# Patient Record
Sex: MALE | Race: BLACK OR AFRICAN AMERICAN | Employment: UNEMPLOYED | URBAN - METROPOLITAN AREA
[De-identification: names, ages, dates, MRNs, and addresses within clinical notes are randomized per-mention and may not be internally consistent; named-entity substitution may affect disease eponyms.]

---

## 2017-01-20 ENCOUNTER — HOSPITAL ENCOUNTER (EMERGENCY)
Age: 57
Discharge: HOME | End: 2017-01-21
Attending: EMERGENCY MEDICINE | Admitting: EMERGENCY MEDICINE
Payer: MEDICAID

## 2017-01-20 ENCOUNTER — APPOINTMENT (OUTPATIENT)
Dept: GENERAL RADIOLOGY | Age: 57
End: 2017-01-20
Attending: EMERGENCY MEDICINE
Payer: MEDICAID

## 2017-01-20 DIAGNOSIS — G89.4 CHRONIC PAIN SYNDROME: ICD-10-CM

## 2017-01-20 DIAGNOSIS — D57.00 SICKLE-CELL ANEMIA WITH CRISIS (HCC): Primary | ICD-10-CM

## 2017-01-20 LAB
ALBUMIN SERPL BCP-MCNC: 3.1 G/DL (ref 3.4–5)
ALBUMIN/GLOB SERPL: 0.8 {RATIO} (ref 0.8–1.7)
ALP SERPL-CCNC: 87 U/L (ref 45–117)
ALT SERPL-CCNC: 24 U/L (ref 16–61)
AMPHET UR QL SCN: NEGATIVE
ANION GAP BLD CALC-SCNC: 11 MMOL/L (ref 3–18)
APPEARANCE UR: CLEAR
AST SERPL W P-5'-P-CCNC: 32 U/L (ref 15–37)
BACTERIA URNS QL MICRO: NEGATIVE /HPF
BARBITURATES UR QL SCN: NEGATIVE
BASOPHILS # BLD AUTO: 0 K/UL (ref 0–0.1)
BASOPHILS # BLD: 0 % (ref 0–3)
BENZODIAZ UR QL: NEGATIVE
BILIRUB SERPL-MCNC: 1 MG/DL (ref 0.2–1)
BILIRUB UR QL: NEGATIVE
BUN SERPL-MCNC: 39 MG/DL (ref 7–18)
BUN/CREAT SERPL: 15 (ref 12–20)
CALCIUM SERPL-MCNC: 8 MG/DL (ref 8.5–10.1)
CANNABINOIDS UR QL SCN: POSITIVE
CHLORIDE SERPL-SCNC: 108 MMOL/L (ref 100–108)
CK SERPL-CCNC: 75 U/L (ref 39–308)
CO2 SERPL-SCNC: 18 MMOL/L (ref 21–32)
COCAINE UR QL SCN: NEGATIVE
COLOR UR: YELLOW
CREAT SERPL-MCNC: 2.53 MG/DL (ref 0.6–1.3)
DIFFERENTIAL METHOD BLD: ABNORMAL
EOSINOPHIL # BLD: 0 K/UL (ref 0–0.4)
EOSINOPHIL NFR BLD: 0 % (ref 0–5)
EPITH CASTS URNS QL MICRO: NORMAL /LPF (ref 0–5)
ERYTHROCYTE [DISTWIDTH] IN BLOOD BY AUTOMATED COUNT: 17.2 % (ref 11.6–14.5)
GLOBULIN SER CALC-MCNC: 4.1 G/DL (ref 2–4)
GLUCOSE SERPL-MCNC: 82 MG/DL (ref 74–99)
GLUCOSE UR STRIP.AUTO-MCNC: NEGATIVE MG/DL
HCT VFR BLD AUTO: 18.1 % (ref 36–48)
HDSCOM,HDSCOM: ABNORMAL
HGB BLD-MCNC: 6.3 G/DL (ref 13–16)
HGB UR QL STRIP: ABNORMAL
KETONES UR QL STRIP.AUTO: NEGATIVE MG/DL
LEUKOCYTE ESTERASE UR QL STRIP.AUTO: NEGATIVE
LYMPHOCYTES # BLD AUTO: 29 % (ref 20–51)
LYMPHOCYTES # BLD: 3.2 K/UL (ref 0.8–3.5)
MAGNESIUM SERPL-MCNC: 2.2 MG/DL (ref 1.8–2.4)
MCH RBC QN AUTO: 28.6 PG (ref 24–34)
MCHC RBC AUTO-ENTMCNC: 34.8 G/DL (ref 31–37)
MCV RBC AUTO: 82.3 FL (ref 74–97)
METHADONE UR QL: NEGATIVE
MONOCYTES # BLD: 0.3 K/UL (ref 0–1)
MONOCYTES NFR BLD AUTO: 3 % (ref 2–9)
NEUTS SEG # BLD: 7.5 K/UL (ref 1.8–8)
NEUTS SEG NFR BLD AUTO: 68 % (ref 42–75)
NITRITE UR QL STRIP.AUTO: NEGATIVE
OPIATES UR QL: NEGATIVE
PCP UR QL: NEGATIVE
PH UR STRIP: 6 [PH] (ref 5–8)
PLATELET # BLD AUTO: 250 K/UL (ref 135–420)
PMV BLD AUTO: 9.7 FL (ref 9.2–11.8)
POTASSIUM SERPL-SCNC: 5.4 MMOL/L (ref 3.5–5.5)
PROT SERPL-MCNC: 7.2 G/DL (ref 6.4–8.2)
PROT UR STRIP-MCNC: 300 MG/DL
RBC # BLD AUTO: 2.2 M/UL (ref 4.7–5.5)
RBC #/AREA URNS HPF: NORMAL /HPF (ref 0–5)
RBC MORPH BLD: ABNORMAL
RETICS/RBC NFR AUTO: 3.2 % (ref 0.5–2.3)
SODIUM SERPL-SCNC: 137 MMOL/L (ref 136–145)
SP GR UR REFRACTOMETRY: 1.01 (ref 1–1.03)
UROBILINOGEN UR QL STRIP.AUTO: 1 EU/DL (ref 0.2–1)
WBC # BLD AUTO: 11 K/UL (ref 4.6–13.2)
WBC URNS QL MICRO: NORMAL /HPF (ref 0–5)

## 2017-01-20 PROCEDURE — 82550 ASSAY OF CK (CPK): CPT | Performed by: EMERGENCY MEDICINE

## 2017-01-20 PROCEDURE — 85025 COMPLETE CBC W/AUTO DIFF WBC: CPT | Performed by: EMERGENCY MEDICINE

## 2017-01-20 PROCEDURE — 96361 HYDRATE IV INFUSION ADD-ON: CPT

## 2017-01-20 PROCEDURE — 80053 COMPREHEN METABOLIC PANEL: CPT | Performed by: EMERGENCY MEDICINE

## 2017-01-20 PROCEDURE — 73521 X-RAY EXAM HIPS BI 2 VIEWS: CPT

## 2017-01-20 PROCEDURE — 81001 URINALYSIS AUTO W/SCOPE: CPT | Performed by: EMERGENCY MEDICINE

## 2017-01-20 PROCEDURE — 74011250636 HC RX REV CODE- 250/636: Performed by: EMERGENCY MEDICINE

## 2017-01-20 PROCEDURE — 96375 TX/PRO/DX INJ NEW DRUG ADDON: CPT

## 2017-01-20 PROCEDURE — 96374 THER/PROPH/DIAG INJ IV PUSH: CPT

## 2017-01-20 PROCEDURE — 99283 EMERGENCY DEPT VISIT LOW MDM: CPT

## 2017-01-20 PROCEDURE — 85045 AUTOMATED RETICULOCYTE COUNT: CPT | Performed by: EMERGENCY MEDICINE

## 2017-01-20 PROCEDURE — 83735 ASSAY OF MAGNESIUM: CPT | Performed by: EMERGENCY MEDICINE

## 2017-01-20 PROCEDURE — 80307 DRUG TEST PRSMV CHEM ANLYZR: CPT | Performed by: EMERGENCY MEDICINE

## 2017-01-20 RX ORDER — HYDROMORPHONE HYDROCHLORIDE 1 MG/ML
1 INJECTION, SOLUTION INTRAMUSCULAR; INTRAVENOUS; SUBCUTANEOUS ONCE
Status: COMPLETED | OUTPATIENT
Start: 2017-01-20 | End: 2017-01-20

## 2017-01-20 RX ORDER — KETOROLAC TROMETHAMINE 30 MG/ML
30 INJECTION, SOLUTION INTRAMUSCULAR; INTRAVENOUS
Status: COMPLETED | OUTPATIENT
Start: 2017-01-20 | End: 2017-01-20

## 2017-01-20 RX ORDER — SODIUM CHLORIDE 0.9 % (FLUSH) 0.9 %
5-10 SYRINGE (ML) INJECTION AS NEEDED
Status: DISCONTINUED | OUTPATIENT
Start: 2017-01-20 | End: 2017-01-21 | Stop reason: HOSPADM

## 2017-01-20 RX ORDER — SODIUM CHLORIDE 0.9 % (FLUSH) 0.9 %
5-10 SYRINGE (ML) INJECTION EVERY 8 HOURS
Status: DISCONTINUED | OUTPATIENT
Start: 2017-01-20 | End: 2017-01-21 | Stop reason: HOSPADM

## 2017-01-20 RX ADMIN — KETOROLAC TROMETHAMINE 30 MG: 30 INJECTION, SOLUTION INTRAMUSCULAR at 23:03

## 2017-01-20 RX ADMIN — HYDROMORPHONE HYDROCHLORIDE 1 MG: 1 INJECTION, SOLUTION INTRAMUSCULAR; INTRAVENOUS; SUBCUTANEOUS at 23:04

## 2017-01-20 RX ADMIN — SODIUM CHLORIDE 1000 ML: 900 INJECTION, SOLUTION INTRAVENOUS at 23:03

## 2017-01-21 VITALS
HEIGHT: 64 IN | OXYGEN SATURATION: 99 % | BODY MASS INDEX: 23.03 KG/M2 | TEMPERATURE: 98.4 F | DIASTOLIC BLOOD PRESSURE: 86 MMHG | HEART RATE: 87 BPM | RESPIRATION RATE: 16 BRPM | WEIGHT: 134.92 LBS | SYSTOLIC BLOOD PRESSURE: 165 MMHG

## 2017-01-21 RX ORDER — OXYCODONE AND ACETAMINOPHEN 10; 325 MG/1; MG/1
1 TABLET ORAL
Qty: 15 TAB | Refills: 0 | Status: SHIPPED | OUTPATIENT
Start: 2017-01-21 | End: 2017-03-29

## 2017-01-21 RX ORDER — HYDROXYUREA 500 MG/1
500 CAPSULE ORAL DAILY
Qty: 30 CAP | Refills: 0 | Status: SHIPPED | OUTPATIENT
Start: 2017-01-21 | End: 2017-02-20

## 2017-01-21 RX ORDER — CARVEDILOL 6.25 MG/1
6.25 TABLET ORAL 2 TIMES DAILY WITH MEALS
Qty: 60 TAB | Refills: 0 | Status: SHIPPED | OUTPATIENT
Start: 2017-01-21 | End: 2017-12-01

## 2017-01-21 RX ORDER — UREA 10 %
100 LOTION (ML) TOPICAL DAILY
Qty: 30 TAB | Refills: 0 | Status: SHIPPED | OUTPATIENT
Start: 2017-01-21 | End: 2017-12-01

## 2017-01-21 RX ORDER — SERTRALINE HYDROCHLORIDE 100 MG/1
100 TABLET, FILM COATED ORAL DAILY
Qty: 30 TAB | Refills: 0 | Status: ON HOLD | OUTPATIENT
Start: 2017-01-21 | End: 2017-11-28

## 2017-01-21 RX ORDER — PYRIDOXINE HCL (VITAMIN B6) 100 MG
100 TABLET ORAL DAILY
Qty: 30 TAB | Refills: 0 | Status: SHIPPED | OUTPATIENT
Start: 2017-01-21 | End: 2017-01-21

## 2017-01-21 RX ORDER — AMLODIPINE BESYLATE 5 MG/1
10 TABLET ORAL DAILY
Qty: 60 TAB | Refills: 0 | Status: SHIPPED | OUTPATIENT
Start: 2017-01-21 | End: 2017-12-01

## 2017-01-21 RX ORDER — DOXEPIN HYDROCHLORIDE 50 MG/1
50 CAPSULE ORAL
Qty: 30 CAP | Refills: 0 | Status: SHIPPED | OUTPATIENT
Start: 2017-01-21 | End: 2017-01-21

## 2017-01-21 RX ORDER — DOXEPIN HYDROCHLORIDE 50 MG/1
50 CAPSULE ORAL
Qty: 30 CAP | Refills: 0 | Status: SHIPPED | OUTPATIENT
Start: 2017-01-21 | End: 2017-12-01

## 2017-01-21 RX ORDER — SERTRALINE HYDROCHLORIDE 100 MG/1
100 TABLET, FILM COATED ORAL DAILY
Qty: 30 TAB | Refills: 0 | Status: SHIPPED | OUTPATIENT
Start: 2017-01-21 | End: 2017-01-21

## 2017-01-21 RX ORDER — CARVEDILOL 6.25 MG/1
6.25 TABLET ORAL 2 TIMES DAILY WITH MEALS
Qty: 60 TAB | Refills: 0 | Status: SHIPPED | OUTPATIENT
Start: 2017-01-21 | End: 2017-01-21

## 2017-01-21 RX ORDER — PYRIDOXINE HCL (VITAMIN B6) 100 MG
100 TABLET ORAL DAILY
Qty: 30 TAB | Refills: 0 | Status: SHIPPED | OUTPATIENT
Start: 2017-01-21 | End: 2017-12-01

## 2017-01-21 RX ORDER — FOLIC ACID 1 MG/1
1 TABLET ORAL DAILY
Qty: 30 TAB | Refills: 2 | Status: SHIPPED | OUTPATIENT
Start: 2017-01-21 | End: 2017-01-21

## 2017-01-21 RX ORDER — UREA 10 %
100 LOTION (ML) TOPICAL DAILY
Qty: 30 TAB | Refills: 0 | Status: SHIPPED | OUTPATIENT
Start: 2017-01-21 | End: 2017-01-21

## 2017-01-21 RX ORDER — AMLODIPINE BESYLATE 5 MG/1
10 TABLET ORAL DAILY
Qty: 60 TAB | Refills: 0 | Status: SHIPPED | OUTPATIENT
Start: 2017-01-21 | End: 2017-01-21

## 2017-01-21 RX ORDER — FOLIC ACID 1 MG/1
1 TABLET ORAL DAILY
Qty: 30 TAB | Refills: 2 | Status: SHIPPED | OUTPATIENT
Start: 2017-01-21 | End: 2017-12-01

## 2017-01-21 RX ORDER — HYDROXYUREA 500 MG/1
500 CAPSULE ORAL DAILY
Qty: 30 CAP | Refills: 0 | Status: SHIPPED | OUTPATIENT
Start: 2017-01-21 | End: 2017-01-21

## 2017-01-21 NOTE — ED NOTES
Pt's daughter called and states that she is waiting for her  to come back with her car but that she does not know where  is and that he is not answering his phone. States that when her  comes back, she will come pick pt up.

## 2017-01-21 NOTE — ED NOTES
Attempted to call pt's daughter Luh Trinidad at number provided by pt at [phone]. Left message on voicemail for pt to return call.
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
## 2017-01-21 NOTE — ED NOTES
Pt attempting to get in touch with his ride. RN confronted pt about ride being in waiting room. Pt states, \"well my daughter has kids and left and was gonna come back when I'm done\".

## 2017-01-21 NOTE — ED NOTES
Pt states his ride is in the waiting room, stated same thing to physician. RN made it very clear that since pt is receiving narcotics he has to have someone pick him up and it cannot be a cab. Pt verbalized understanding. RN called to waiting room for family. Registration states his family stepped out.

## 2017-01-21 NOTE — ED TRIAGE NOTES
Right side arm, shoulder, back and leg pain r/t sickle cell. States is out of 10mg/325mg percocet b/c needs to find new pain management dr. He was d/c from his last practice r/t [facility] in [facility]. Sepsis Screening completed    (  )Patient meets SIRS criteria. ( x )Patient does not meet SIRS criteria.       SIRS Criteria is achieved when two or more of the following are present   Temperature < 96.8°F (36°C) or > 100.9°F (38.3°C)   Heart Rate > 90 beats per minute   Respiratory Rate > 20 beats per minute   WBC count > 12,000 or <4,000 or > 10% bands

## 2017-01-21 NOTE — DISCHARGE INSTRUCTIONS
Chronic Pain: Care Instructions  Your Care Instructions  Chronic pain is pain that lasts a long time (months or even years) and may or may not have a clear cause. It is different from acute pain, which usually does have a clear cause--like an injury or illness--and gets better over time. Chronic pain:  · Lasts over time but may vary from day to day. · Does not go away despite efforts to end it. · May disrupt your sleep and lead to fatigue. · May cause depression or anxiety. · May make your muscles tense, causing more pain. · Can disrupt your work, hobbies, home life, and relationships with friends and family. Chronic pain is a very real condition. It is not just in your head. Treatment can help and usually includes several methods used together, such as medicines, physical therapy, exercise, and other treatments. Learning how to relax and changing negative thought patterns can also help you cope. Chronic pain is complex. Taking an active role in your treatment will help you better manage your pain. Tell your doctor if you have trouble dealing with your pain. You may have to try several things before you find what works best for you. Follow-up care is a key part of your treatment and safety. Be sure to make and go to all appointments, and call your doctor if you are having problems. Its also a good idea to know your test results and keep a list of the medicines you take. How can you care for yourself at home? · Pace yourself. Break up large jobs into smaller tasks. Save harder tasks for days when you have less pain, or go back and forth between hard tasks and easier ones. Take rest breaks. · Relax, and reduce stress. Relaxation techniques such as deep breathing or meditation can help. · Keep moving. Gentle, daily exercise can help reduce pain over the long run. Try low- or no-impact exercises such as walking, swimming, and stationary biking. Do stretches to stay flexible.   · Try heat, cold packs, and massage. · Get enough sleep. Chronic pain can make you tired and drain your energy. Talk with your doctor if you have trouble sleeping because of pain. · Think positive. Your thoughts can affect your pain level. Do things that you enjoy to distract yourself when you have pain instead of focusing on the pain. See a movie, read a book, listen to music, or spend time with a friend. · If you think you are depressed, talk to your doctor about treatment. · Keep a daily pain diary. Record how your moods, thoughts, sleep patterns, activities, and medicine affect your pain. You may find that your pain is worse during or after certain activities or when you are feeling a certain emotion. Having a record of your pain can help you and your doctor find the best ways to treat your pain. · Take pain medicines exactly as directed. ¨ If the doctor gave you a prescription medicine for pain, take it as prescribed. ¨ If you are not taking a prescription pain medicine, ask your doctor if you can take an over-the-counter medicine. Reducing constipation caused by pain medicine  · Include fruits, vegetables, beans, and whole grains in your diet each day. These foods are high in fiber. · Drink plenty of fluids, enough so that your urine is light yellow or clear like water. If you have kidney, heart, or liver disease and have to limit fluids, talk with your doctor before you increase the amount of fluids you drink. · If your doctor recommends it, get more exercise. Walking is a good choice. Bit by bit, increase the amount you walk every day. Try for at least 30 minutes on most days of the week. · Schedule time each day for a bowel movement. A daily routine may help. Take your time and do not strain when having a bowel movement. When should you call for help? Call your doctor now or seek immediate medical care if:  · Your pain gets worse or is out of control.   · You feel down or blue, or you do not enjoy things like you once did. You may be depressed, which is common in people with chronic pain. Depression can be treated. · You have vomiting or cramps for more than 2 hours. Watch closely for changes in your health, and be sure to contact your doctor if:  · You cannot sleep because of pain. · You are very worried or anxious about your pain. · You have trouble taking your pain medicine. · You have any concerns about your pain medicine. · You have trouble with bowel movements, such as:  ¨ No bowel movement in 3 days. ¨ Blood in the anal area, in your stool, or on the toilet paper. ¨ Diarrhea for more than 24 hours. Where can you learn more? Go to http://harman-rosalio.info/. Enter N004 in the search box to learn more about \"Chronic Pain: Care Instructions. \"  Current as of: February 19, 2016  Content Version: 11.1  © 0504-7125 [employer]. Care instructions adapted under license by [employer] (which disclaims liability or warranty for this information). If you have questions about a medical condition or this instruction, always ask your healthcare professional. [place] [address] any warranty or liability for your use of this information. Sickle Cell Crisis: Care Instructions  Your Care Instructions    Sickle cell crisis is a painful episode that may begin suddenly in a person with sickle cell disease. Sickle cell disease turns normal, round red blood cells into cells that look like munira or crescent moons. The sickle-shaped cells can get stuck in blood vessels, blocking blood flow and causing severe pain. The pain can occur in the bones of the spine, the arms and legs, the chest, and the abdomen. An episode may be called a \"painful event\" or \"painful crisis. \" Some people who have sickle cell disease have many painful events, while others have few or none. Treatment depends on the level of pain and how long it lasts.  Sometimes taking nonprescription pain relievers can help. Or you may need stronger pain relief medicine that is prescribed or given by a doctor. You may need to be treated in the hospital.  It isn't always possible to know what sets off a painful event. But triggers include being dehydrated, cold temperatures, infection, stress, and not getting enough oxygen. Follow-up care is a key part of your treatment and safety. Be sure to make and go to all appointments, and call your doctor if you are having problems. It's also a good idea to know your test results and keep a list of the medicines you take. How can you care for yourself at home? · Create a pain management plan with your doctor. This plan should include the types of medicines you can take and other actions you can take at home to relieve pain. · Drink plenty of fluids, enough so that your urine is light yellow or clear like water. If you have kidney, heart, or liver disease and have to limit fluids, talk with your doctor before you increase the amount of fluids you drink. · Take your medicines exactly as prescribed. Call your doctor if you think you are having a problem with your medicine. · Take pain medicines exactly as directed. ¨ If the doctor gave you a prescription medicine for pain, take it as prescribed. ¨ If you are not taking a prescription pain medicine, ask your doctor if you can take an over-the-counter medicine. · Avoid alcohol. It can make you dehydrated. · Dress warmly in cold weather. The cold and windy weather can lead to severe pain. · Do not smoke. Smoking can reduce the amount of oxygen in your blood. · Get plenty of sleep. When should you call for help? Call 911 anytime you think you may need emergency care. For example, call if:  · You passed out (lost consciousness). Call your doctor now or seek immediate medical care if:  · You are in severe pain. · You are dizzy or lightheaded, or you feel like you may faint. · You have a fever.   · You are short of breath. · Your symptoms are getting worse. Watch closely for changes in your health, and be sure to contact your doctor if you are not getting better as expected. Where can you learn more? Go to http://harman-rosalio.info/. Enter F104 in the search box to learn more about \"Sickle Cell Crisis: Care Instructions. \"  Current as of: February 5, 2016  Content Version: 11.1  © 1729-8090 [employer]. Care instructions adapted under license by [employer] (which disclaims liability or warranty for this information). If you have questions about a medical condition or this instruction, always ask your healthcare professional. [address] any warranty or liability for your use of this information.

## 2017-03-16 ENCOUNTER — HOSPITAL ENCOUNTER (INPATIENT)
Age: 57
LOS: 12 days | Discharge: HOME HEALTH | DRG: 662 | End: 2017-03-29
Attending: FAMILY MEDICINE | Admitting: FAMILY MEDICINE
Payer: MEDICAID

## 2017-03-16 ENCOUNTER — APPOINTMENT (OUTPATIENT)
Dept: GENERAL RADIOLOGY | Age: 57
DRG: 662 | End: 2017-03-16
Attending: FAMILY MEDICINE
Payer: MEDICAID

## 2017-03-16 DIAGNOSIS — E87.5 ACUTE HYPERKALEMIA: ICD-10-CM

## 2017-03-16 DIAGNOSIS — N17.9 AKI (ACUTE KIDNEY INJURY) (HCC): ICD-10-CM

## 2017-03-16 DIAGNOSIS — D57.00 SICKLE CELL CRISIS (HCC): ICD-10-CM

## 2017-03-16 DIAGNOSIS — T40.601A NARCOTIC OVERDOSE, ACCIDENTAL OR UNINTENTIONAL, INITIAL ENCOUNTER (HCC): Primary | ICD-10-CM

## 2017-03-16 LAB
ANION GAP BLD CALC-SCNC: 7 MMOL/L (ref 3–18)
BASOPHILS # BLD AUTO: 0 K/UL (ref 0–0.1)
BASOPHILS # BLD: 0 % (ref 0–3)
BUN SERPL-MCNC: 30 MG/DL (ref 7–18)
BUN/CREAT SERPL: 11 (ref 12–20)
CALCIUM SERPL-MCNC: 8 MG/DL (ref 8.5–10.1)
CHLORIDE SERPL-SCNC: 103 MMOL/L (ref 100–108)
CO2 SERPL-SCNC: 23 MMOL/L (ref 21–32)
CREAT SERPL-MCNC: 2.8 MG/DL (ref 0.6–1.3)
DIFFERENTIAL METHOD BLD: ABNORMAL
EOSINOPHIL # BLD: 0.3 K/UL (ref 0–0.4)
EOSINOPHIL NFR BLD: 1 % (ref 0–5)
ERYTHROCYTE [DISTWIDTH] IN BLOOD BY AUTOMATED COUNT: 16.8 % (ref 11.6–14.5)
GLUCOSE SERPL-MCNC: 118 MG/DL (ref 74–99)
HCT VFR BLD AUTO: 17.7 % (ref 36–48)
HGB BLD-MCNC: 6.1 G/DL (ref 13–16)
LYMPHOCYTES # BLD AUTO: 11 % (ref 20–51)
LYMPHOCYTES # BLD: 3 K/UL (ref 0.8–3.5)
MCH RBC QN AUTO: 29.2 PG (ref 24–34)
MCHC RBC AUTO-ENTMCNC: 34.5 G/DL (ref 31–37)
MCV RBC AUTO: 84.7 FL (ref 74–97)
MONOCYTES # BLD: 2.5 K/UL (ref 0–1)
MONOCYTES NFR BLD AUTO: 9 % (ref 2–9)
NEUTS SEG # BLD: 21.9 K/UL (ref 1.8–8)
NEUTS SEG NFR BLD AUTO: 79 % (ref 42–75)
PLATELET # BLD AUTO: 221 K/UL (ref 135–420)
PMV BLD AUTO: 10.2 FL (ref 9.2–11.8)
POTASSIUM SERPL-SCNC: 5.8 MMOL/L (ref 3.5–5.5)
RBC # BLD AUTO: 2.09 M/UL (ref 4.7–5.5)
RBC MORPH BLD: ABNORMAL
SODIUM SERPL-SCNC: 133 MMOL/L (ref 136–145)
WBC # BLD AUTO: 27.7 K/UL (ref 4.6–13.2)

## 2017-03-16 PROCEDURE — 74011250637 HC RX REV CODE- 250/637: Performed by: FAMILY MEDICINE

## 2017-03-16 PROCEDURE — 96361 HYDRATE IV INFUSION ADD-ON: CPT

## 2017-03-16 PROCEDURE — 74011000250 HC RX REV CODE- 250: Performed by: FAMILY MEDICINE

## 2017-03-16 PROCEDURE — 77030013169 SET IV BLD ICUM -A

## 2017-03-16 PROCEDURE — 71010 XR CHEST PORT: CPT

## 2017-03-16 PROCEDURE — 85025 COMPLETE CBC W/AUTO DIFF WBC: CPT | Performed by: FAMILY MEDICINE

## 2017-03-16 PROCEDURE — 96375 TX/PRO/DX INJ NEW DRUG ADDON: CPT

## 2017-03-16 PROCEDURE — 96374 THER/PROPH/DIAG INJ IV PUSH: CPT

## 2017-03-16 PROCEDURE — 93005 ELECTROCARDIOGRAM TRACING: CPT

## 2017-03-16 PROCEDURE — 80048 BASIC METABOLIC PNL TOTAL CA: CPT | Performed by: FAMILY MEDICINE

## 2017-03-16 PROCEDURE — 74011250636 HC RX REV CODE- 250/636: Performed by: FAMILY MEDICINE

## 2017-03-16 PROCEDURE — 99285 EMERGENCY DEPT VISIT HI MDM: CPT

## 2017-03-16 RX ORDER — DEXTROSE 50 % IN WATER (D50W) INTRAVENOUS SYRINGE
25
Status: COMPLETED | OUTPATIENT
Start: 2017-03-16 | End: 2017-03-16

## 2017-03-16 RX ORDER — KETOROLAC TROMETHAMINE 30 MG/ML
15 INJECTION, SOLUTION INTRAMUSCULAR; INTRAVENOUS
Status: COMPLETED | OUTPATIENT
Start: 2017-03-16 | End: 2017-03-16

## 2017-03-16 RX ORDER — SODIUM POLYSTYRENE SULFONATE 15 G/60ML
15 SUSPENSION ORAL; RECTAL
Status: COMPLETED | OUTPATIENT
Start: 2017-03-16 | End: 2017-03-16

## 2017-03-16 RX ORDER — NALOXONE HYDROCHLORIDE 1 MG/ML
1 INJECTION INTRAMUSCULAR; INTRAVENOUS; SUBCUTANEOUS
Status: COMPLETED | OUTPATIENT
Start: 2017-03-16 | End: 2017-03-16

## 2017-03-16 RX ADMIN — SODIUM CHLORIDE 2000 ML: 900 INJECTION, SOLUTION INTRAVENOUS at 21:53

## 2017-03-16 RX ADMIN — NALOXONE HYDROCHLORIDE 1 MG: 1 INJECTION PARENTERAL at 21:19

## 2017-03-16 RX ADMIN — HUMAN INSULIN 10 UNITS: 100 INJECTION, SOLUTION SUBCUTANEOUS at 22:55

## 2017-03-16 RX ADMIN — SODIUM POLYSTYRENE SULFONATE 15 G: 15 SUSPENSION ORAL; RECTAL at 22:59

## 2017-03-16 RX ADMIN — KETOROLAC TROMETHAMINE 15 MG: 30 INJECTION, SOLUTION INTRAMUSCULAR at 21:54

## 2017-03-16 RX ADMIN — DEXTROSE MONOHYDRATE 25 G: 500 INJECTION PARENTERAL at 22:57

## 2017-03-16 RX ADMIN — SODIUM CHLORIDE 1000 ML: 900 INJECTION, SOLUTION INTRAVENOUS at 21:18

## 2017-03-16 NOTE — IP AVS SNAPSHOT
Current Discharge Medication List  
  
START taking these medications Dose & Instructions Dispensing Information Comments Morning Noon Evening Bedtime  
 amoxicillin-clavulanate 875-125 mg per tablet Commonly known as:  AUGMENTIN Your last dose was: Your next dose is:    
   
   
 Dose:  1 Tab Take 1 Tab by mouth two (2) times a day for 10 days. Quantity:  20 Tab Refills:  0  
     
   
   
   
  
 nicotine 21 mg/24 hr  
Commonly known as:  Fanyalexa Ocasio Your last dose was: Your next dose is:    
   
   
 Dose:  1 Patch 1 Patch by TransDERmal route daily for 30 days. Quantity:  30 Patch Refills:  0  
     
   
   
   
  
 oxyCODONE-acetaminophen 7.5-325 mg per tablet Commonly known as:  PERCOCET 7.5 Replaces:  oxyCODONE-acetaminophen  mg per tablet Your last dose was: Your next dose is:    
   
   
 Dose:  1 Tab Take 1 Tab by mouth every six (6) hours as needed. Max Daily Amount: 4 Tabs. Quantity:  10 Tab Refills:  0 CONTINUE these medications which have NOT CHANGED Dose & Instructions Dispensing Information Comments Morning Noon Evening Bedtime  
 amLODIPine 5 mg tablet Commonly known as:  Yon Kuo Your last dose was: Your next dose is:    
   
   
 Dose:  10 mg Take 2 Tabs by mouth daily. Quantity:  60 Tab Refills:  0  
     
   
   
   
  
 carvedilol 6.25 mg tablet Commonly known as:  Ezio Yang Your last dose was: Your next dose is:    
   
   
 Dose:  6.25 mg Take 1 Tab by mouth two (2) times daily (with meals). Quantity:  60 Tab Refills:  0  
     
   
   
   
  
 cyanocobalamin 100 mcg tablet Commonly known as:  VITAMIN B-12 Your last dose was: Your next dose is:    
   
   
 Dose:  100 mcg Take 1 Tab by mouth daily. Quantity:  30 Tab Refills:  0  
     
   
   
   
  
 doxepin 50 mg capsule Commonly known as:  SINEquan Your last dose was: Your next dose is:    
   
   
 Dose:  50 mg Take 1 Cap by mouth nightly. Indications: Depression Quantity:  30 Cap Refills:  0  
     
   
   
   
  
 folic acid 1 mg tablet Commonly known as:  [employer] Your last dose was: Your next dose is:    
   
   
 Dose:  1 mg Take 1 Tab by mouth daily. Quantity:  30 Tab Refills:  2  
     
   
   
   
  
 pyridoxine (vitamin B6) 100 mg tablet Commonly known as:  VITAMIN B-6 Your last dose was: Your next dose is:    
   
   
 Dose:  100 mg Take 1 Tab by mouth daily. Quantity:  30 Tab Refills:  0  
     
   
   
   
  
 sertraline 100 mg tablet Commonly known as:  ZOLOFT Your last dose was: Your next dose is:    
   
   
 Dose:  100 mg Take 1 Tab by mouth daily. Indications: ANXIETY WITH DEPRESSION, substance induced depression Quantity:  30 Tab Refills:  0  
     
   
   
   
  
 sodium bicarbonate 650 mg tablet Your last dose was: Your next dose is:    
   
   
 Dose:  650 mg Take 650 mg by mouth two (2) times a day. Refills:  0  
     
   
   
   
  
 traZODone 50 mg tablet Commonly known as:  Kushal Hollingsworth Your last dose was: Your next dose is:    
   
   
 Dose:  50 mg Take 1 Tab by mouth nightly as needed for Sleep. Indications: sleep Quantity:  30 Tab Refills:  3 STOP taking these medications KEFLEX 500 mg capsule Generic drug:  cephALEXin  
   
  
 oxyCODONE-acetaminophen  mg per tablet Commonly known as:  PERCOCET 10 Replaced by:  oxyCODONE-acetaminophen 7.5-325 mg per tablet Where to Get Your Medications Information on where to get these meds will be given to you by the nurse or doctor. !  Ask your nurse or doctor about these medications  
  amoxicillin-clavulanate 875-125 mg per tablet  
 nicotine 21 mg/24 hr  
 oxyCODONE-acetaminophen 7.5-325 mg per tablet

## 2017-03-16 NOTE — IP AVS SNAPSHOT
[address] [address] 
[phone] Patient: Li Jones MRN: [id number] EZA:[id number] You are allergic to the following No active allergies Recent Documentation Height Weight BMI Smoking Status 1.651 m 73.4 kg 26.93 kg/m2 Current Every Day Smoker Unresulted Labs Order Current Status COMPLEMENT, C3 & C4 In process Emergency Contacts Name Discharge Info Relation Home Work Mobile Paola Noguera  Child [2] [phone] About your hospitalization You were admitted on:  March 17, 2017 You last received care in the:  [address] [place] [address] You were discharged on:  March 29, 2017 Unit phone number:  [phone] Why you were hospitalized Your primary diagnosis was:  Acute Hyperkalemia Your diagnoses also included:  Narcotic Overdose, Hyponatremia, Eliecer (Acute Kidney Injury) (Hcc), Sickle Cell Anemia (Hcc), Sickle Cell Crisis (Hcc), Abscess Of Buttock, Right, Pain Of Right Hip Joint, Bilateral Pleural Effusion, Ckd (Chronic Kidney Disease) Stage 3, Gfr 30-59 Ml/Min Providers Seen During Your Hospitalizations Provider Role Specialty Primary office phone Yamilet Cheng MD Attending Provider Emergency Medicine [phone] Pieter Yepez MD Attending Provider [facility] [phone] Your Primary Care Physician (PCP) Primary Care Physician Office Phone Office Fax OTHER, PHYS ** None ** ** None ** Follow-up Information Follow up With Details Comments Contact Info [address] [facility] [place] Code [address] 
[phone] Phys Lazaro, MD In 3 days  Patient can only remember the practice name and not the physician Dr. Sergio Reynoso -hematology   In 1 week  VOA -for sickle cells Dr. Iqbal-nephrology  In 3 days    
 wound clinic  In 4 days Current Discharge Medication List  
  
START taking these medications Dose & Instructions Dispensing Information Comments Morning Noon Evening Bedtime  
 amoxicillin-clavulanate 875-125 mg per tablet Commonly known as:  AUGMENTIN Your last dose was: Your next dose is:    
   
   
 Dose:  1 Tab Take 1 Tab by mouth two (2) times a day for 10 days. Quantity:  20 Tab Refills:  0  
     
   
   
   
  
 nicotine 21 mg/24 hr  
Commonly known as:  Mayra Alt Your last dose was: Your next dose is:    
   
   
 Dose:  1 Patch 1 Patch by TransDERmal route daily for 30 days. Quantity:  30 Patch Refills:  0  
     
   
   
   
  
 oxyCODONE-acetaminophen 7.5-325 mg per tablet Commonly known as:  PERCOCET 7.5 Replaces:  oxyCODONE-acetaminophen  mg per tablet Your last dose was: Your next dose is:    
   
   
 Dose:  1 Tab Take 1 Tab by mouth every six (6) hours as needed. Max Daily Amount: 4 Tabs. Quantity:  10 Tab Refills:  0 CONTINUE these medications which have NOT CHANGED Dose & Instructions Dispensing Information Comments Morning Noon Evening Bedtime  
 amLODIPine 5 mg tablet Commonly known as:  Yue Miller Your last dose was: Your next dose is:    
   
   
 Dose:  10 mg Take 2 Tabs by mouth daily. Quantity:  60 Tab Refills:  0  
     
   
   
   
  
 carvedilol 6.25 mg tablet Commonly known as:  Nemesio Mckeon Your last dose was: Your next dose is:    
   
   
 Dose:  6.25 mg Take 1 Tab by mouth two (2) times daily (with meals). Quantity:  60 Tab Refills:  0  
     
   
   
   
  
 cyanocobalamin 100 mcg tablet Commonly known as:  VITAMIN B-12 Your last dose was: Your next dose is:    
   
   
 Dose:  100 mcg Take 1 Tab by mouth daily. Quantity:  30 Tab Refills:  0  
     
   
   
   
  
 doxepin 50 mg capsule Commonly known as:  SINEquan Your last dose was: Your next dose is:    
   
   
 Dose:  50 mg Take 1 Cap by mouth nightly. Indications: Depression Quantity:  30 Cap Refills:  0  
     
   
   
   
  
 folic acid 1 mg tablet Commonly known as:  Google Your last dose was: Your next dose is:    
   
   
 Dose:  1 mg Take 1 Tab by mouth daily. Quantity:  30 Tab Refills:  2  
     
   
   
   
  
 pyridoxine (vitamin B6) 100 mg tablet Commonly known as:  VITAMIN B-6 Your last dose was: Your next dose is:    
   
   
 Dose:  100 mg Take 1 Tab by mouth daily. Quantity:  30 Tab Refills:  0  
     
   
   
   
  
 sertraline 100 mg tablet Commonly known as:  ZOLOFT Your last dose was: Your next dose is:    
   
   
 Dose:  100 mg Take 1 Tab by mouth daily. Indications: ANXIETY WITH DEPRESSION, substance induced depression Quantity:  30 Tab Refills:  0  
     
   
   
   
  
 sodium bicarbonate 650 mg tablet Your last dose was: Your next dose is:    
   
   
 Dose:  650 mg Take 650 mg by mouth two (2) times a day. Refills:  0  
     
   
   
   
  
 traZODone 50 mg tablet Commonly known as:  Ayanard Archana Your last dose was: Your next dose is:    
   
   
 Dose:  50 mg Take 1 Tab by mouth nightly as needed for Sleep. Indications: sleep Quantity:  30 Tab Refills:  3 STOP taking these medications KEFLEX 500 mg capsule Generic drug:  cephALEXin  
   
  
 oxyCODONE-acetaminophen  mg per tablet Commonly known as:  PERCOCET 10 Replaced by:  oxyCODONE-acetaminophen 7.5-325 mg per tablet Where to Get Your Medications Information on where to get these meds will be given to you by the nurse or doctor. ! Ask your nurse or doctor about these medications  
  amoxicillin-clavulanate 875-125 mg per tablet  
 nicotine 21 mg/24 hr  
 oxyCODONE-acetaminophen 7.5-325 mg per tablet Discharge Instructions DISCHARGE SUMMARY from Nurse The following personal items are in your possession at time of discharge: 
 
Dental Appliances: None Visual Aid: None Home Medications: None Jewelry: None Clothing: Other (comment) (Pt is homeless, came with large bag of clothing) Other Valuables: Cell Phone PATIENT INSTRUCTIONS: 
 
 
F-face looks uneven A-arms unable to move or move unevenly S-speech slurred or non-existent T-time-call 911 as soon as signs and symptoms begin-DO NOT go Back to bed or wait to see if you get better-TIME IS BRAIN. Warning Signs of HEART ATTACK Call 911 if you have these symptoms: 
? Chest discomfort. Most heart attacks involve discomfort in the center of the chest that lasts more than a few minutes, or that goes away and comes back. It can feel like uncomfortable pressure, squeezing, fullness, or pain. ? Discomfort in other areas of the upper body. Symptoms can include pain or discomfort in one or both arms, the back, neck, jaw, or stomach. ? Shortness of breath with or without chest discomfort. ? Other signs may include breaking out in a cold sweat, nausea, or lightheadedness. Don't wait more than five minutes to call [address]! Fast action can save your life. Calling 911 is almost always the fastest way to get lifesaving treatment. Emergency Medical Services staff can begin treatment when they arrive  up to an hour sooner than if someone gets to the hospital by car. The discharge information has been reviewed with the patient.   The patient verbalized understanding. Discharge medications reviewed with the patient and appropriate educational materials and side effects teaching were provided. Patient armband removed and shredded Discharge Instructions Attachments/References LOW SODIUM DIET (ENGLISH) POTASSIUM-RESTRICTED DIET (ENGLISH) ANORECTAL ABSCESS SURGERY: POST-OP (ENGLISH) SICKLE CELL CRISIS (ENGLISH) Discharge Orders None [place] Announcement We are excited to announce that we are making your provider's discharge notes available to you in Sensser. You will see these notes when they are completed and signed by the physician that discharged you from your recent hospital stay. If you have any questions or concerns about any information you see in Sensser, please call the Health Information Department where you were seen or reach out to your Primary Care Provider for more information about your plan of care. Introducing [facility]! [facility] introduces Sensser patient portal. Now you can access parts of your medical record, email your doctor's office, and request medication refills online. 1. In your internet browser, go to [email or web address]. [email or web address] 2. Click on the First Time User? Click Here link in the Sign In box. You will see the New Member Sign Up page. 3. Enter your Sensser Access Code exactly as it appears below. You will not need to use this code after youve completed the sign-up process. If you do not sign up before the expiration date, you must request a new code. · Sensser Access Code: [email or web address] Expires: 6/14/2017  8:55 PM 
 
4. Enter the last four digits of your Social Security Number (xxxx) and Date of Birth (mm/dd/yyyy) as indicated and click Submit. You will be taken to the next sign-up page. 5. Create a Sensser ID. This will be your Sensser login ID and cannot be changed, so think of one that is secure and easy to remember. 6. Create a HealthyChic password. You can change your password at any time. 7. Enter your Password Reset Question and Answer. This can be used at a later time if you forget your password. 8. Enter your e-mail address. You will receive e-mail notification when new information is available in [address] 9. Click Sign Up. You can now view and download portions of your medical record. 10. Click the Download Summary menu link to download a portable copy of your medical information. If you have questions, please visit the Frequently Asked Questions section of the HealthyChic website. Remember, HealthyChic is NOT to be used for urgent needs. For medical emergencies, dial 911. Now available from your iPhone and Android! General Information Please provide this summary of care documentation to your next provider. Patient Signature:  ____________________________________________________________ Date:  ____________________________________________________________  
  
Nicole Rosa Provider Signature:  ____________________________________________________________ Date:  ____________________________________________________________ More Information Low Sodium Diet (2,000 Milligram): Care Instructions Your Care Instructions Too much sodium causes your body to hold on to extra water. This can raise your blood pressure and force your heart and kidneys to work harder. In very serious cases, this could cause you to be put in the hospital. It might even be life-threatening. By limiting sodium, you will feel better and lower your risk of serious problems. The most common source of sodium is salt. People get most of the salt in their diet from canned, prepared, and packaged foods. Fast food and restaurant meals also are very high in sodium. Your doctor will probably limit your sodium to less than 2,000 milligrams (mg) a day.  This limit counts all the sodium in prepared and packaged foods and any salt you add to your food. Follow-up care is a key part of your treatment and safety. Be sure to make and go to all appointments, and call your doctor if you are having problems. It's also a good idea to know your test results and keep a list of the medicines you take. How can you care for yourself at home? Read food labels · Read labels on cans and food packages. The labels tell you how much sodium is in each serving. Make sure that you look at the serving size. If you eat more than the serving size, you have eaten more sodium. · Food labels also tell you the Percent Daily Value for sodium. Choose products with low Percent Daily Values for sodium. · Be aware that sodium can come in forms other than salt, including monosodium glutamate (MSG), sodium citrate, and sodium bicarbonate (baking soda). MSG is often added to Asian food. When you eat out, you can sometimes ask for food without MSG or added salt. Buy low-sodium foods · Buy foods that are labeled \"unsalted\" (no salt added), \"sodium-free\" (less than 5 mg of sodium per serving), or \"low-sodium\" (less than 140 mg of sodium per serving). Foods labeled \"reduced-sodium\" and \"light sodium\" may still have too much sodium. Be sure to read the label to see how much sodium you are getting. · Buy fresh vegetables, or frozen vegetables without added sauces. Buy low-sodium versions of canned vegetables, soups, and other canned goods. Prepare low-sodium meals · Cut back on the amount of salt you use in cooking. This will help you adjust to the taste. Do not add salt after cooking. One teaspoon of salt has about 2,300 mg of sodium. · Take the salt shaker off the table. · Flavor your food with garlic, lemon juice, onion, vinegar, herbs, and spices. Do not use soy sauce, lite soy sauce, steak sauce, onion salt, garlic salt, celery salt, mustard, or ketchup on your food. · Use low-sodium salad dressings, sauces, and ketchup. Or make your own salad dressings and sauces without adding salt. · Use less salt (or none) when recipes call for it. You can often use half the salt a recipe calls for without losing flavor. Other foods such as rice, pasta, and grains do not need added salt. · Rinse canned vegetables, and cook them in fresh water. This removes somebut not allof the salt. · Avoid water that is naturally high in sodium or that has been treated with water softeners, which add sodium. Call your local water company to find out the sodium content of your water supply. If you buy bottled water, read the label and choose a sodium-free brand. Avoid high-sodium foods · Avoid eating: ¨ Smoked, cured, salted, and canned meat, fish, and poultry. ¨ Ham, sanchez, hot dogs, and luncheon meats. ¨ Regular, hard, and processed cheese and regular peanut butter. ¨ Crackers with salted tops, and other salted snack foods such as pretzels, chips, and salted popcorn. ¨ Frozen prepared meals, unless labeled low-sodium. ¨ Canned and dried soups, broths, and bouillon, unless labeled sodium-free or low-sodium. ¨ Canned vegetables, unless labeled sodium-free or low-sodium. ¨ Western Daija fries, pizza, tacos, and other fast foods. ¨ Pickles, olives, ketchup, and other condiments, especially soy sauce, unless labeled sodium-free or low-sodium. Where can you learn more? Go to http://harman-rosalio.info/. Enter S854 in the search box to learn more about \"Low Sodium Diet (2,000 Milligram): Care Instructions. \" Current as of: July 26, 2016 Content Version: 11.1 © 7293-5469 [employer]. Care instructions adapted under license by [employer] (which disclaims liability or warranty for this information).  If you have questions about a medical condition or this instruction, always ask your healthcare professional. [facility] disclaims any warranty or liability for your use of this information. Potassium-Restricted Diet: Care Instructions Your Care Instructions Potassium is a mineral. It helps keep the right mix of fluids in your body. It also helps your nerves and muscles work as they should. Most people get the potassium they need from the foods they eat. But if you have certain health problems, such as kidney disease, you may need to be careful about how much potassium you get. This is because too much potassium can be harmful. You can control how much potassium you get. You can do this if you eat foods that don't have much of it and you don't eat foods that have lots of it. Follow-up care is a key part of your treatment and safety. Be sure to make and go to all appointments, and call your doctor if you are having problems. It's also a good idea to know your test results and keep a list of the medicines you take. How can you care for yourself at home? · Potassium is in many foods, including in vegetables, fruits, and milk products. Foods high in potassium include bananas, cantaloupe, and tomatoes. They also include foods like broccoli, milk, and potatoes. · Low potassium foods include blueberries, raspberries, and cucumbers. They also include white or brown rice, spaghetti, and macaroni. · Do not use a salt substitute or \"lite\" salt unless you talk to your doctor first. These often are very high in potassium. · Be sure to tell your doctor about any prescription or over-the-counter medicines you are taking. Some medicines can increase the potassium in your body. Where can you learn more? Go to http://harman-rosalio.info/. Enter M262 in the search box to learn more about \"Potassium-Restricted Diet: Care Instructions. \" Current as of: July 26, 2016 Content Version: 11.2 © 5089-4119 [employer], [place].  Care instructions adapted under license by [address] (which disclaims liability or warranty for this information). If you have questions about a medical condition or this instruction, always ask your healthcare professional. [address] any warranty or liability for your use of this information. Anorectal Abscess Surgery: What to Expect at [facility] Your Recovery Most of the pain that was caused by your abscess will probably go away right after surgery. But you may have some mild pain in your anal area from the incision for several days after the surgery. Most people can go back to work or their normal routine 1 or 2 days after surgery. It will probably take about 2 to 3 weeks for your abscess to completely heal. 
Most people get better without any problems. But sometimes a tunnel can form between the old abscess and the outside of the body. This is called a fistula. Your doctor will check for this about 2 to 3 weeks after surgery. If you develop a fistula, the doctor will do surgery to repair the fistula. This care sheet gives you a general idea about how long it will take for you to recover. But each person recovers at a different pace. Follow the steps below to get better as quickly as possible. How can you care for yourself at home? Activity · Rest when you feel tired. Getting enough sleep will help you recover. · Try to walk each day. Start by walking a little more than you did the day before. Bit by bit, increase the amount you walk. Walking boosts blood flow and helps prevent pneumonia and constipation. · Ask your doctor when you can drive again. · Most people are able to return to work 1 or 2 days after surgery. · You may shower. Let water run over the abscess area. This will help the abscess heal. Pat your anal area dry with a towel when you are done. Diet · You can eat your normal diet.  If your stomach is upset, try bland, low-fat foods like plain rice, broiled chicken, toast, and yogurt. · Drink plenty of fluids (unless your doctor tells you not to). · Eat a low-fiber diet for a couple days or as your doctor suggests. It is best to eat many small meals throughout the day. Add high-fiber foods a little at a time. · You may notice that your bowel movements are not regular right after your surgery. This is common. Try to avoid constipation and straining with bowel movements. If you have not had a bowel movement after a couple of days, ask your doctor about taking a mild laxative. Medicines · Your doctor will tell you if and when you can restart your medicines. He or she will also give you instructions about taking any new medicines. · If you take blood thinners, such as warfarin (Coumadin), clopidogrel (Plavix), or aspirin, be sure to talk to your doctor. He or she will tell you if and when to start taking those medicines again. Make sure that you understand exactly what your doctor wants you to do. · Take pain medicines exactly as directed. ¨ If the doctor gave you a prescription medicine for pain, take it as prescribed. ¨ If you are not taking a prescription pain medicine, ask your doctor if you can take an over-the-counter medicine. · If your doctor prescribed antibiotics, take them as directed. Do not stop taking them just because you feel better. You need to take the full course of antibiotics. · If you think your pain medicine is making you sick to your stomach: 
¨ Take your medicine after meals (unless your doctor has told you not to). ¨ Ask your doctor for a different pain medicine. Incision care · Wash your anal area daily with warm, soapy water, and pat it dry. Don't use hydrogen peroxide or alcohol, which can slow healing. You may cover the area with a gauze bandage if it weeps or rubs against clothing. Change the bandage every day.  
· After a bowel movement, use a baby wipe or take a shower or sitz bath to gently clean the anal area. · If your doctor put gauze in your abscess during surgery, follow his or her instructions about when to remove it. Other instructions · Place a maxi pad or gauze in your underwear to absorb drainage from your abscess while it heals. · Sit in a few inches of warm water (sitz bath) for 15 to 20 minutes 3 times a day. Do this as long as you have pain in your anal area. · Apply ice several times a day for 10 to 20 minutes at a time. Put a thin cloth between the ice and your skin. · Support your feet with a small step stool when you sit on the toilet. This helps flex your hips and places your pelvis in a squatting position. This can make bowel movements easier after surgery. · Try lying on your stomach with a pillow under your hips to decrease swelling. Follow-up care is a key part of your treatment and safety. Be sure to make and go to all appointments, and call your doctor if you are having problems. It's also a good idea to know your test results and keep a list of the medicines you take. When should you call for help? Call 911 anytime you think you may need emergency care. For example, call if: 
· You passed out (lost consciousness). · You have sudden chest pain and shortness of breath, or you cough up blood. · You have severe belly pain. Call your doctor now or seek immediate medical care if: 
· You have bleeding from your anus that soaks 2 or more large gauze pads. · You have pain that does not get better after you take your pain medicine. · You have signs of infection, such as: 
¨ Increased pain, swelling, warmth, or redness. ¨ Red streaks leading from the incision. ¨ Pus draining from the incision. ¨ Swollen lymph nodes in your groin. ¨ A fever. · You have loose stitches, or your incision comes open. · You have stool that leaks from your anus, or you cannot control when you have a bowel movement. Watch closely for any changes in your health, and be sure to contact your doctor if: 
· You do not have a bowel movement after taking a laxative. Where can you learn more? Go to http://harman-rosalio.info/. Enter 0676 543 [id number] in the search box to learn more about \"Anorectal Abscess Surgery: What to Expect at Home. \" Current as of: August 9, 2016 Content Version: 11.2 © 5985-5958 [employer]). Care instructions adapted under license by [employer] (which disclaims liability or warranty for this information). If you have questions about a medical condition or this instruction, always ask your healthcare professional. [place] [address] any warranty or liability for your use of this information. Sickle Cell Crisis: Care Instructions Your Care Instructions Sickle cell crisis is a painful episode that may begin suddenly in a person with sickle cell disease. Sickle cell disease turns normal, round red blood cells into cells that look like munira or crescent moons. The sickle-shaped cells can get stuck in blood vessels, blocking blood flow and causing severe pain. The pain can occur in the bones of the spine, the arms and legs, the chest, and the abdomen. An episode may be called a \"painful event\" or \"painful crisis. \" Some people who have sickle cell disease have many painful events, while others have few or none. Treatment depends on the level of pain and how long it lasts. Sometimes taking nonprescription pain relievers can help. Or you may need stronger pain relief medicine that is prescribed or given by a doctor. You may need to be treated in the hospital. 
It isn't always possible to know what sets off a painful event. But triggers include being dehydrated, cold temperatures, infection, stress, and not getting enough oxygen. Follow-up care is a key part of your treatment and safety.  Be sure to make and go to all appointments, and call your doctor if you are having problems. It's also a good idea to know your test results and keep a list of the medicines you take. How can you care for yourself at home? · Create a pain management plan with your doctor. This plan should include the types of medicines you can take and other actions you can take at home to relieve pain. · Drink plenty of fluids, enough so that your urine is light yellow or clear like water. If you have kidney, heart, or liver disease and have to limit fluids, talk with your doctor before you increase the amount of fluids you drink. · Take your medicines exactly as prescribed. Call your doctor if you think you are having a problem with your medicine. · Take pain medicines exactly as directed. ¨ If the doctor gave you a prescription medicine for pain, take it as prescribed. ¨ If you are not taking a prescription pain medicine, ask your doctor if you can take an over-the-counter medicine. · Avoid alcohol. It can make you dehydrated. · Dress warmly in cold weather. The cold and windy weather can lead to severe pain. · Do not smoke. Smoking can reduce the amount of oxygen in your blood. · Get plenty of sleep. When should you call for help? Call 911 anytime you think you may need emergency care. For example, call if: 
· You passed out (lost consciousness). Call your doctor now or seek immediate medical care if: 
· You are in severe pain. · You are dizzy or lightheaded, or you feel like you may faint. · You have a fever. · You are short of breath. · Your symptoms are getting worse. Watch closely for changes in your health, and be sure to contact your doctor if you are not getting better as expected. Where can you learn more? Go to http://harman-rosalio.info/. Enter F104 in the search box to learn more about \"Sickle Cell Crisis: Care Instructions. \" Current as of: October 13, 2016 Content Version: 11.2 © 7615-0051 [employer]. Care instructions adapted under license by "[employer] (which disclaims liability or warranty for this information). If you have questions about a medical condition or this instruction, always ask your healthcare professional. [address] any warranty or liability for your use of this information.

## 2017-03-16 NOTE — IP AVS SNAPSHOT
Summary of Care Report The Summary of Care report has been created to help improve care coordination. Users with access to [employer] or [address] (Web-based application) may access additional patient information including the Discharge Summary. If you are not currently a [address] user and need more information, please call the number listed below in the [place] [address] section and ask to be connected with Medical Records. Facility Information Name Address Phone [address] [address] [phone] Patient Information Patient Name Sex  Herbert Mcgowanrose ([id number]) Male 1960 Discharge Information Admitting Provider Service Area Unit Michelle Loya MD / [address] Surg/Onco / [phone] Discharge Provider Discharge Date/Time Discharge Disposition Destination (none) 3/29/2017 (Pending) AHR (none) Patient Language Language ENGLISH [13] Hospital Problems as of 3/29/2017  Reviewed: 3/21/2017  5:41 PM by Chet Bosworth, MD  
  
  
  
 Class Noted - Resolved Last Modified POA Active Problems Sickle cell anemia (HCC) (Chronic)  2014 - Present 3/17/2017 by Michelle Loya MD Yes Entered by Tesfaye Salas DO Sickle cell crisis ([facility]  2016 - Present 3/17/2017 by Michelle Loya MD Yes Entered by Michelle Loya MD  
  EDGAR (acute kidney injury) ([facility] [address] [facility]  2016 - Present 3/17/2017 by Micehlle Loya MD Yes Entered by Michelle Loya MD  
  * (Principal)Acute hyperkalemia  3/16/2017 - Present 3/17/2017 by Michelle Loya MD Yes Entered by Stacie Hanson MD  
  Narcotic overdose  3/17/2017 - Present 3/17/2017 by Michelle Loya MD Yes Entered by Michelle Loya MD  
  Hyponatremia  3/17/2017 - Present 3/17/2017 by Michelle Loya MD Yes Entered by Nae Herring MD  
  Abscess of buttock, right  3/20/2017 - Present 3/20/2017 by Luz Lu MD Unknown Entered by Luz Lu MD  
  Pain of right hip joint  3/20/2017 - Present 3/20/2017 by Luz Lu MD Unknown Entered by Luz Lu MD  
  Bilateral pleural effusion  3/20/2017 - Present 3/20/2017 by Luz Lu MD Unknown Entered by Luz Lu MD  
  CKD (chronic kidney disease) stage 3, GFR 30-59 ml/min  3/21/2017 - Present 3/21/2017 by Luz Lu MD Unknown Entered by Luz Lu MD  
  
Non-Hospital Problems as of 3/29/2017  Reviewed: 3/21/2017  5:41 PM by Jose Thorpe MD  
  
  
  
 Class Noted - Resolved Last Modified Active Problems Avascular necrosis of bone of right hip ([address]  11/4/2014 - Present 11/8/2014 Entered by Rick Wilder, DO  
  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  
  MDD (major depressive disorder)  10/26/2015 - Present 10/26/2015 by Lety Iyer Entered by Lety Iyer Dehydration  11/20/2016 - Present 11/20/2016 by Nae Herring MD  
  Entered by Nae Herring MD  
  Drug abuse  11/20/2016 - Present 11/20/2016 by Nae Herring MD  
  Entered by Nae Herring MD  
  
You are allergic to the following No active allergies Current Discharge Medication List  
  
START taking these medications Dose & Instructions Dispensing Information Comments  
 amoxicillin-clavulanate 875-125 mg per tablet Commonly known as:  AUGMENTIN Dose:  1 Tab Take 1 Tab by mouth two (2) times a day for 10 days. Quantity:  20 Tab Refills:  0  
   
 nicotine 21 mg/24 hr  
Commonly known as:  Antony Line Dose:  1 Patch 1 Patch by TransDERmal route daily for 30 days. Quantity:  30 Patch Refills:  0  
   
 oxyCODONE-acetaminophen 7.5-325 mg per tablet Commonly known as:  PERCOCET 7.5 Replaces:  oxyCODONE-acetaminophen  mg per tablet Dose:  1 Tab Take 1 Tab by mouth every six (6) hours as needed. Max Daily Amount: 4 Tabs. Quantity:  10 Tab Refills:  0 CONTINUE these medications which have NOT CHANGED Dose & Instructions Dispensing Information Comments  
 amLODIPine 5 mg tablet Commonly known as:  Sherrye Acron Dose:  10 mg Take 2 Tabs by mouth daily. Quantity:  60 Tab Refills:  0  
   
 carvedilol 6.25 mg tablet Commonly known as:  Angel Hurtado Dose:  6.25 mg Take 1 Tab by mouth two (2) times daily (with meals). Quantity:  60 Tab Refills:  0  
   
 cyanocobalamin 100 mcg tablet Commonly known as:  VITAMIN B-12 Dose:  100 mcg Take 1 Tab by mouth daily. Quantity:  30 Tab Refills:  0  
   
 doxepin 50 mg capsule Commonly known as:  SINEquan Dose:  50 mg Take 1 Cap by mouth nightly. Indications: Depression Quantity:  30 Cap Refills:  0  
   
 folic acid 1 mg tablet Commonly known as:  Google Dose:  1 mg Take 1 Tab by mouth daily. Quantity:  30 Tab Refills:  2  
   
 pyridoxine (vitamin B6) 100 mg tablet Commonly known as:  VITAMIN B-6 Dose:  100 mg Take 1 Tab by mouth daily. Quantity:  30 Tab Refills:  0  
   
 sertraline 100 mg tablet Commonly known as:  ZOLOFT Dose:  100 mg Take 1 Tab by mouth daily. Indications: ANXIETY WITH DEPRESSION, substance induced depression Quantity:  30 Tab Refills:  0 sodium bicarbonate 650 mg tablet Dose:  650 mg Take 650 mg by mouth two (2) times a day. Refills:  0  
   
 traZODone 50 mg tablet Commonly known as:  Noreene Josseline Dose:  50 mg Take 1 Tab by mouth nightly as needed for Sleep. Indications: sleep Quantity:  30 Tab Refills:  3 STOP taking these medications Comments KEFLEX 500 mg capsule Generic drug:  cephALEXin  
   
   
 oxyCODONE-acetaminophen  mg per tablet Commonly known as:  PERCOCET 10 Replaced by:  oxyCODONE-acetaminophen 7.5-325 mg per tablet Current Immunizations Name Date Influenza Vaccine 10/17/2014 Influenza Vaccine (Quad) PF 11/21/2016, 10/23/2015 Pneumococcal Polysaccharide (PPSV-23) 11/8/2014 Surgery Information ID Date/Time Status Primary Surgeon All Procedures Location [id number] 3/21/2017 1811 Brittnee Woo MD Incision and drainage of right buttock abcess [facility] MAIN OR Follow-up Information Follow up With Details Comments Contact Info [address] [facility] [place] [address] [phone] Lindsay Willis MD In 3 days  Patient can only remember the practice name and not the physician Dr. Kirsten Guzmán -hematology   In 1 week  VOA -for sickle cells Dr. Iqbal-nephrology  In 3 days    
 wound clinic  In 4 days Discharge Instructions DISCHARGE SUMMARY from Nurse The following personal items are in your possession at time of discharge: 
 
Dental Appliances: None Visual Aid: None Home Medications: None Jewelry: None Clothing: Other (comment) (Pt is homeless, came with large bag of clothing) Other Valuables: Cell Phone PATIENT INSTRUCTIONS: 
 
 
F-face looks uneven A-arms unable to move or move unevenly S-speech slurred or non-existent T-time-call 911 as soon as signs and symptoms begin-DO NOT go Back to bed or wait to see if you get better-TIME IS BRAIN. Warning Signs of HEART ATTACK Call 911 if you have these symptoms: 
? Chest discomfort. Most heart attacks involve discomfort in the center of the chest that lasts more than a few minutes, or that goes away and comes back. It can feel like uncomfortable pressure, squeezing, fullness, or pain. ? Discomfort in other areas of the upper body. Symptoms can include pain or discomfort in one or both arms, the back, neck, jaw, or stomach. ? Shortness of breath with or without chest discomfort. ? Other signs may include breaking out in a cold sweat, nausea, or lightheadedness. Don't wait more than five minutes to call [address]! Fast action can save your life. Calling 911 is almost always the fastest way to get lifesaving treatment. Emergency Medical Services staff can begin treatment when they arrive  up to an hour sooner than if someone gets to the hospital by car. The discharge information has been reviewed with the patient. The patient verbalized understanding. Discharge medications reviewed with the patient and appropriate educational materials and side effects teaching were provided. Patient armband removed and shredded Chart Review Routing History Recipient Method Report Sent By Jeffrey Harrell DO Phone: [phone] In [employer] IP Auto Routed [employer], [place] [[id number]] 11/10/2014  5:09 PM 11/10/2014 Shukri Porter MD  
Fax: [phone] Phone: [phone] Fax IP Auto Routed [facility] Paulina Miller [[id number]] 10/27/2015  1:31 PM 10/27/2015 Jeromy Guardado MD  
Fax: [phone] Phone: [phone] Fax IP Auto Routed Pasha Griffin MD [[id number]] 3/26/2017  2:12 PM 03/26/2017

## 2017-03-17 PROBLEM — E87.1 HYPONATREMIA: Status: ACTIVE | Noted: 2017-03-17

## 2017-03-17 PROBLEM — T40.601A NARCOTIC OVERDOSE (HCC): Status: ACTIVE | Noted: 2017-03-17

## 2017-03-17 LAB
ALBUMIN SERPL BCP-MCNC: 2.2 G/DL (ref 3.4–5)
ALBUMIN SERPL BCP-MCNC: 2.2 G/DL (ref 3.4–5)
ALBUMIN/GLOB SERPL: 0.6 {RATIO} (ref 0.8–1.7)
ALBUMIN/GLOB SERPL: 0.6 {RATIO} (ref 0.8–1.7)
ALP SERPL-CCNC: 67 U/L (ref 45–117)
ALP SERPL-CCNC: 88 U/L (ref 45–117)
ALT SERPL-CCNC: 14 U/L (ref 16–61)
ALT SERPL-CCNC: 16 U/L (ref 16–61)
AMPHET UR QL SCN: NEGATIVE
ANION GAP BLD CALC-SCNC: 10 MMOL/L (ref 3–18)
ANION GAP BLD CALC-SCNC: 8 MMOL/L (ref 3–18)
APPEARANCE UR: CLEAR
AST SERPL W P-5'-P-CCNC: 21 U/L (ref 15–37)
AST SERPL W P-5'-P-CCNC: 25 U/L (ref 15–37)
BARBITURATES UR QL SCN: POSITIVE
BASOPHILS # BLD AUTO: 0 K/UL (ref 0–0.1)
BASOPHILS # BLD: 0 % (ref 0–3)
BENZODIAZ UR QL: NEGATIVE
BILIRUB SERPL-MCNC: 0.5 MG/DL (ref 0.2–1)
BILIRUB SERPL-MCNC: 0.9 MG/DL (ref 0.2–1)
BILIRUB UR QL: NEGATIVE
BUN SERPL-MCNC: 28 MG/DL (ref 7–18)
BUN SERPL-MCNC: 30 MG/DL (ref 7–18)
BUN/CREAT SERPL: 11 (ref 12–20)
BUN/CREAT SERPL: 12 (ref 12–20)
CALCIUM SERPL-MCNC: 7.4 MG/DL (ref 8.5–10.1)
CALCIUM SERPL-MCNC: 8 MG/DL (ref 8.5–10.1)
CANNABINOIDS UR QL SCN: NEGATIVE
CHLORIDE SERPL-SCNC: 110 MMOL/L (ref 100–108)
CHLORIDE SERPL-SCNC: 111 MMOL/L (ref 100–108)
CO2 SERPL-SCNC: 18 MMOL/L (ref 21–32)
CO2 SERPL-SCNC: 19 MMOL/L (ref 21–32)
COCAINE UR QL SCN: POSITIVE
COLOR UR: YELLOW
CREAT SERPL-MCNC: 2.41 MG/DL (ref 0.6–1.3)
CREAT SERPL-MCNC: 2.65 MG/DL (ref 0.6–1.3)
DIFFERENTIAL METHOD BLD: ABNORMAL
EOSINOPHIL # BLD: 0.2 K/UL (ref 0–0.4)
EOSINOPHIL NFR BLD: 1 % (ref 0–5)
ERYTHROCYTE [DISTWIDTH] IN BLOOD BY AUTOMATED COUNT: 17.2 % (ref 11.6–14.5)
ERYTHROCYTE [SEDIMENTATION RATE] IN BLOOD: 54 MM/HR (ref 0–20)
FERRITIN SERPL-MCNC: 1314 NG/ML (ref 8–388)
FOLATE SERPL-MCNC: 12.2 NG/ML (ref 3.1–17.5)
GLOBULIN SER CALC-MCNC: 3.8 G/DL (ref 2–4)
GLOBULIN SER CALC-MCNC: 3.9 G/DL (ref 2–4)
GLUCOSE BLD STRIP.AUTO-MCNC: 67 MG/DL (ref 70–110)
GLUCOSE BLD STRIP.AUTO-MCNC: 70 MG/DL (ref 70–110)
GLUCOSE SERPL-MCNC: 113 MG/DL (ref 74–99)
GLUCOSE SERPL-MCNC: 96 MG/DL (ref 74–99)
GLUCOSE UR STRIP.AUTO-MCNC: NEGATIVE MG/DL
HCT VFR BLD AUTO: 15.5 % (ref 36–48)
HCT VFR BLD AUTO: 20.6 % (ref 36–48)
HDSCOM,HDSCOM: ABNORMAL
HGB BLD-MCNC: 5.2 G/DL (ref 13–16)
HGB BLD-MCNC: 7.1 G/DL (ref 13–16)
HGB UR QL STRIP: NEGATIVE
KETONES UR QL STRIP.AUTO: NEGATIVE MG/DL
LDH SERPL L TO P-CCNC: 280 U/L (ref 81–234)
LEUKOCYTE ESTERASE UR QL STRIP.AUTO: NEGATIVE
LYMPHOCYTES # BLD AUTO: 5 % (ref 20–51)
LYMPHOCYTES # BLD: 1.2 K/UL (ref 0.8–3.5)
MCH RBC QN AUTO: 28.6 PG (ref 24–34)
MCHC RBC AUTO-ENTMCNC: 33.5 G/DL (ref 31–37)
MCV RBC AUTO: 85.2 FL (ref 74–97)
METHADONE UR QL: NEGATIVE
MONOCYTES # BLD: 1 K/UL (ref 0–1)
MONOCYTES NFR BLD AUTO: 4 % (ref 2–9)
NEUTS BAND NFR BLD MANUAL: 2 % (ref 0–5)
NEUTS SEG # BLD: 21.6 K/UL (ref 1.8–8)
NEUTS SEG NFR BLD AUTO: 88 % (ref 42–75)
NITRITE UR QL STRIP.AUTO: NEGATIVE
OPIATES UR QL: POSITIVE
PCP UR QL: NEGATIVE
PH UR STRIP: 6 [PH] (ref 5–8)
PLATELET # BLD AUTO: 197 K/UL (ref 135–420)
PMV BLD AUTO: 10.3 FL (ref 9.2–11.8)
POTASSIUM SERPL-SCNC: 6.3 MMOL/L (ref 3.5–5.5)
POTASSIUM SERPL-SCNC: 6.3 MMOL/L (ref 3.5–5.5)
PROT SERPL-MCNC: 6 G/DL (ref 6.4–8.2)
PROT SERPL-MCNC: 6.1 G/DL (ref 6.4–8.2)
PROT UR STRIP-MCNC: 300 MG/DL
RBC # BLD AUTO: 1.82 M/UL (ref 4.7–5.5)
RBC MORPH BLD: ABNORMAL
RETICS/RBC NFR AUTO: 7 % (ref 0.5–2.3)
SODIUM SERPL-SCNC: 137 MMOL/L (ref 136–145)
SODIUM SERPL-SCNC: 139 MMOL/L (ref 136–145)
SP GR UR REFRACTOMETRY: 1.01 (ref 1–1.03)
UROBILINOGEN UR QL STRIP.AUTO: 1 EU/DL (ref 0.2–1)
VIT B12 SERPL-MCNC: 319 PG/ML (ref 211–911)
WBC # BLD AUTO: 24.5 K/UL (ref 4.6–13.2)
WBC MORPH BLD: ABNORMAL
WBC URNS QL MICRO: NORMAL /HPF (ref 0–5)

## 2017-03-17 PROCEDURE — 30233N1 TRANSFUSION OF NONAUTOLOGOUS RED BLOOD CELLS INTO PERIPHERAL VEIN, PERCUTANEOUS APPROACH: ICD-10-PCS | Performed by: FAMILY MEDICINE

## 2017-03-17 PROCEDURE — 85652 RBC SED RATE AUTOMATED: CPT | Performed by: FAMILY MEDICINE

## 2017-03-17 PROCEDURE — 85045 AUTOMATED RETICULOCYTE COUNT: CPT | Performed by: INTERNAL MEDICINE

## 2017-03-17 PROCEDURE — 82962 GLUCOSE BLOOD TEST: CPT

## 2017-03-17 PROCEDURE — 81001 URINALYSIS AUTO W/SCOPE: CPT | Performed by: FAMILY MEDICINE

## 2017-03-17 PROCEDURE — 82784 ASSAY IGA/IGD/IGG/IGM EACH: CPT | Performed by: INTERNAL MEDICINE

## 2017-03-17 PROCEDURE — 36415 COLL VENOUS BLD VENIPUNCTURE: CPT | Performed by: FAMILY MEDICINE

## 2017-03-17 PROCEDURE — 80053 COMPREHEN METABOLIC PANEL: CPT | Performed by: INTERNAL MEDICINE

## 2017-03-17 PROCEDURE — P9016 RBC LEUKOCYTES REDUCED: HCPCS | Performed by: FAMILY MEDICINE

## 2017-03-17 PROCEDURE — 36430 TRANSFUSION BLD/BLD COMPNT: CPT

## 2017-03-17 PROCEDURE — 82746 ASSAY OF FOLIC ACID SERUM: CPT | Performed by: INTERNAL MEDICINE

## 2017-03-17 PROCEDURE — 86850 RBC ANTIBODY SCREEN: CPT | Performed by: FAMILY MEDICINE

## 2017-03-17 PROCEDURE — 85018 HEMOGLOBIN: CPT | Performed by: FAMILY MEDICINE

## 2017-03-17 PROCEDURE — 83010 ASSAY OF HAPTOGLOBIN QUANT: CPT | Performed by: INTERNAL MEDICINE

## 2017-03-17 PROCEDURE — 65660000000 HC RM CCU STEPDOWN

## 2017-03-17 PROCEDURE — 74011250636 HC RX REV CODE- 250/636: Performed by: FAMILY MEDICINE

## 2017-03-17 PROCEDURE — 86905 BLOOD TYPING RBC ANTIGENS: CPT | Performed by: FAMILY MEDICINE

## 2017-03-17 PROCEDURE — 74011000250 HC RX REV CODE- 250

## 2017-03-17 PROCEDURE — 80307 DRUG TEST PRSMV CHEM ANLYZR: CPT | Performed by: FAMILY MEDICINE

## 2017-03-17 PROCEDURE — 83883 ASSAY NEPHELOMETRY NOT SPEC: CPT | Performed by: INTERNAL MEDICINE

## 2017-03-17 PROCEDURE — 83615 LACTATE (LD) (LDH) ENZYME: CPT | Performed by: INTERNAL MEDICINE

## 2017-03-17 PROCEDURE — 74011000250 HC RX REV CODE- 250: Performed by: FAMILY MEDICINE

## 2017-03-17 PROCEDURE — 87040 BLOOD CULTURE FOR BACTERIA: CPT | Performed by: INTERNAL MEDICINE

## 2017-03-17 PROCEDURE — 85025 COMPLETE CBC W/AUTO DIFF WBC: CPT | Performed by: FAMILY MEDICINE

## 2017-03-17 PROCEDURE — 80053 COMPREHEN METABOLIC PANEL: CPT | Performed by: FAMILY MEDICINE

## 2017-03-17 PROCEDURE — 82728 ASSAY OF FERRITIN: CPT | Performed by: INTERNAL MEDICINE

## 2017-03-17 PROCEDURE — 74011250637 HC RX REV CODE- 250/637: Performed by: FAMILY MEDICINE

## 2017-03-17 PROCEDURE — 85014 HEMATOCRIT: CPT | Performed by: FAMILY MEDICINE

## 2017-03-17 PROCEDURE — 86920 COMPATIBILITY TEST SPIN: CPT | Performed by: FAMILY MEDICINE

## 2017-03-17 PROCEDURE — 85660 RBC SICKLE CELL TEST: CPT | Performed by: FAMILY MEDICINE

## 2017-03-17 RX ORDER — DOXEPIN HYDROCHLORIDE 50 MG/1
50 CAPSULE ORAL
Status: DISCONTINUED | OUTPATIENT
Start: 2017-03-17 | End: 2017-03-29 | Stop reason: HOSPADM

## 2017-03-17 RX ORDER — SODIUM CHLORIDE 0.9 % (FLUSH) 0.9 %
5-10 SYRINGE (ML) INJECTION EVERY 8 HOURS
Status: DISCONTINUED | OUTPATIENT
Start: 2017-03-17 | End: 2017-03-29 | Stop reason: HOSPADM

## 2017-03-17 RX ORDER — KETOROLAC TROMETHAMINE 30 MG/ML
30 INJECTION, SOLUTION INTRAMUSCULAR; INTRAVENOUS
Status: DISPENSED | OUTPATIENT
Start: 2017-03-17 | End: 2017-03-22

## 2017-03-17 RX ORDER — LORAZEPAM 2 MG/ML
2 INJECTION INTRAMUSCULAR
Status: DISCONTINUED | OUTPATIENT
Start: 2017-03-17 | End: 2017-03-29 | Stop reason: HOSPADM

## 2017-03-17 RX ORDER — DEXTROSE 50 % IN WATER (D50W) INTRAVENOUS SYRINGE
25
Status: COMPLETED | OUTPATIENT
Start: 2017-03-17 | End: 2017-03-17

## 2017-03-17 RX ORDER — TRAZODONE HYDROCHLORIDE 50 MG/1
50 TABLET ORAL
Status: DISCONTINUED | OUTPATIENT
Start: 2017-03-17 | End: 2017-03-29 | Stop reason: HOSPADM

## 2017-03-17 RX ORDER — UREA 10 %
100 LOTION (ML) TOPICAL DAILY
Status: DISCONTINUED | OUTPATIENT
Start: 2017-03-17 | End: 2017-03-29 | Stop reason: HOSPADM

## 2017-03-17 RX ORDER — DEXTROSE 50 % IN WATER (D50W) INTRAVENOUS SYRINGE
25 ONCE
Status: COMPLETED | OUTPATIENT
Start: 2017-03-17 | End: 2017-03-17

## 2017-03-17 RX ORDER — LORAZEPAM 1 MG/1
2 TABLET ORAL
Status: DISCONTINUED | OUTPATIENT
Start: 2017-03-17 | End: 2017-03-29 | Stop reason: HOSPADM

## 2017-03-17 RX ORDER — KETOROLAC TROMETHAMINE 15 MG/ML
15 INJECTION, SOLUTION INTRAMUSCULAR; INTRAVENOUS
Status: DISCONTINUED | OUTPATIENT
Start: 2017-03-17 | End: 2017-03-17

## 2017-03-17 RX ORDER — ONDANSETRON 2 MG/ML
4 INJECTION INTRAMUSCULAR; INTRAVENOUS
Status: DISCONTINUED | OUTPATIENT
Start: 2017-03-17 | End: 2017-03-21

## 2017-03-17 RX ORDER — SODIUM CHLORIDE 0.9 % (FLUSH) 0.9 %
5-10 SYRINGE (ML) INJECTION AS NEEDED
Status: DISCONTINUED | OUTPATIENT
Start: 2017-03-17 | End: 2017-03-29 | Stop reason: HOSPADM

## 2017-03-17 RX ORDER — SODIUM BICARBONATE 650 MG/1
650 TABLET ORAL 2 TIMES DAILY
COMMUNITY
End: 2017-12-01

## 2017-03-17 RX ORDER — CARVEDILOL 3.12 MG/1
6.25 TABLET ORAL 2 TIMES DAILY WITH MEALS
Status: DISCONTINUED | OUTPATIENT
Start: 2017-03-17 | End: 2017-03-29 | Stop reason: HOSPADM

## 2017-03-17 RX ORDER — SODIUM CHLORIDE 9 MG/ML
150 INJECTION, SOLUTION INTRAVENOUS CONTINUOUS
Status: DISCONTINUED | OUTPATIENT
Start: 2017-03-17 | End: 2017-03-20

## 2017-03-17 RX ORDER — SODIUM POLYSTYRENE SULFONATE 15 G/60ML
30 SUSPENSION ORAL; RECTAL ONCE
Status: COMPLETED | OUTPATIENT
Start: 2017-03-17 | End: 2017-03-17

## 2017-03-17 RX ORDER — KETOROLAC TROMETHAMINE 30 MG/ML
15 INJECTION, SOLUTION INTRAMUSCULAR; INTRAVENOUS
Status: DISCONTINUED | OUTPATIENT
Start: 2017-03-17 | End: 2017-03-17 | Stop reason: SDUPTHER

## 2017-03-17 RX ORDER — SODIUM CHLORIDE 9 MG/ML
250 INJECTION, SOLUTION INTRAVENOUS AS NEEDED
Status: DISCONTINUED | OUTPATIENT
Start: 2017-03-17 | End: 2017-03-29 | Stop reason: HOSPADM

## 2017-03-17 RX ORDER — DIPHENHYDRAMINE HYDROCHLORIDE 50 MG/ML
12.5 INJECTION, SOLUTION INTRAMUSCULAR; INTRAVENOUS
Status: DISCONTINUED | OUTPATIENT
Start: 2017-03-17 | End: 2017-03-29 | Stop reason: HOSPADM

## 2017-03-17 RX ORDER — CEPHALEXIN 500 MG/1
500 CAPSULE ORAL 4 TIMES DAILY
COMMUNITY
Start: 2017-03-15 | End: 2017-03-29

## 2017-03-17 RX ORDER — LORAZEPAM 1 MG/1
1 TABLET ORAL
Status: DISCONTINUED | OUTPATIENT
Start: 2017-03-17 | End: 2017-03-29 | Stop reason: HOSPADM

## 2017-03-17 RX ORDER — IBUPROFEN 200 MG
1 TABLET ORAL DAILY
Status: DISCONTINUED | OUTPATIENT
Start: 2017-03-17 | End: 2017-03-29 | Stop reason: HOSPADM

## 2017-03-17 RX ORDER — LORAZEPAM 2 MG/ML
1 INJECTION INTRAMUSCULAR
Status: DISCONTINUED | OUTPATIENT
Start: 2017-03-17 | End: 2017-03-29 | Stop reason: HOSPADM

## 2017-03-17 RX ORDER — LANOLIN ALCOHOL/MO/W.PET/CERES
100 CREAM (GRAM) TOPICAL DAILY
Status: DISCONTINUED | OUTPATIENT
Start: 2017-03-17 | End: 2017-03-29 | Stop reason: HOSPADM

## 2017-03-17 RX ORDER — DEXTROSE 50 % IN WATER (D50W) INTRAVENOUS SYRINGE
Status: COMPLETED
Start: 2017-03-17 | End: 2017-03-17

## 2017-03-17 RX ORDER — LORAZEPAM 2 MG/ML
3 INJECTION INTRAMUSCULAR
Status: DISCONTINUED | OUTPATIENT
Start: 2017-03-17 | End: 2017-03-29 | Stop reason: HOSPADM

## 2017-03-17 RX ORDER — FOLIC ACID 1 MG/1
1 TABLET ORAL DAILY
Status: DISCONTINUED | OUTPATIENT
Start: 2017-03-17 | End: 2017-03-29 | Stop reason: HOSPADM

## 2017-03-17 RX ORDER — KETOROLAC TROMETHAMINE 15 MG/ML
15 INJECTION, SOLUTION INTRAMUSCULAR; INTRAVENOUS
Status: COMPLETED | OUTPATIENT
Start: 2017-03-17 | End: 2017-03-17

## 2017-03-17 RX ORDER — AMLODIPINE BESYLATE 5 MG/1
10 TABLET ORAL DAILY
Status: DISCONTINUED | OUTPATIENT
Start: 2017-03-17 | End: 2017-03-29 | Stop reason: HOSPADM

## 2017-03-17 RX ADMIN — DOXEPIN HYDROCHLORIDE 50 MG: 50 CAPSULE ORAL at 21:00

## 2017-03-17 RX ADMIN — Medication 100 MG: at 08:50

## 2017-03-17 RX ADMIN — DEXTROSE MONOHYDRATE 25 G: 25 INJECTION, SOLUTION INTRAVENOUS at 21:00

## 2017-03-17 RX ADMIN — KETOROLAC TROMETHAMINE 15 MG: 15 INJECTION, SOLUTION INTRAMUSCULAR; INTRAVENOUS at 18:32

## 2017-03-17 RX ADMIN — VITAM B12 100 MCG: 100 TAB at 08:50

## 2017-03-17 RX ADMIN — DEXTROSE MONOHYDRATE 25 G: 25 INJECTION, SOLUTION INTRAVENOUS at 06:55

## 2017-03-17 RX ADMIN — HUMAN INSULIN 10 UNITS: 100 INJECTION, SOLUTION SUBCUTANEOUS at 21:01

## 2017-03-17 RX ADMIN — CARVEDILOL 6.25 MG: 3.12 TABLET, FILM COATED ORAL at 18:19

## 2017-03-17 RX ADMIN — CARVEDILOL 6.25 MG: 3.12 TABLET, FILM COATED ORAL at 08:50

## 2017-03-17 RX ADMIN — AMLODIPINE BESYLATE 10 MG: 5 TABLET ORAL at 08:49

## 2017-03-17 RX ADMIN — FOLIC ACID 1 MG: 1 TABLET ORAL at 08:49

## 2017-03-17 RX ADMIN — HUMAN INSULIN 10 UNITS: 100 INJECTION, SOLUTION SUBCUTANEOUS at 06:54

## 2017-03-17 RX ADMIN — SODIUM CHLORIDE 150 ML/HR: 900 INJECTION, SOLUTION INTRAVENOUS at 08:00

## 2017-03-17 RX ADMIN — SODIUM CHLORIDE 150 ML/HR: 900 INJECTION, SOLUTION INTRAVENOUS at 20:59

## 2017-03-17 RX ADMIN — SODIUM POLYSTYRENE SULFONATE 30 G: 15 SUSPENSION ORAL; RECTAL at 21:00

## 2017-03-17 RX ADMIN — SODIUM CHLORIDE 150 ML/HR: 900 INJECTION, SOLUTION INTRAVENOUS at 00:57

## 2017-03-17 RX ADMIN — DOXEPIN HYDROCHLORIDE 50 MG: 50 CAPSULE ORAL at 00:51

## 2017-03-17 RX ADMIN — KETOROLAC TROMETHAMINE 15 MG: 15 INJECTION, SOLUTION INTRAMUSCULAR; INTRAVENOUS at 23:02

## 2017-03-17 RX ADMIN — Medication 10 ML: at 21:01

## 2017-03-17 RX ADMIN — DEXTROSE MONOHYDRATE 25 G: 25 INJECTION, SOLUTION INTRAVENOUS at 23:41

## 2017-03-17 NOTE — ED NOTES
Following narcan, patient states \"What did you give me? You gave me too much. I have sickle cell. \" Patient alert. Rodo Veras MD aware. Patient provided with warm blankets. Patient states he only took 2 percocets tonight.

## 2017-03-17 NOTE — CDMP QUERY
Please clarify if this patient is being treated/managed for:    =>Metabolic Encephalopathy in the setting of AMS, lethargy, pinpoint pupils  =>Other Explanation of clinical findings  =>Unable to Determine (no explanation of clinical findings)    The medical record reflects the following clinical findings, treatment, and risk factors:    Chronically ill patient admitted with AMS, pinpoint pupils. Pt noted to be acting incoherent and had slurred speech at the homeless shelter where he stays. ** Urine drug screen positive for cocaine, barbiturates, opiates. Given Narcan. Please clarify and document your clinical opinion in the progress notes and discharge summary including the definitive and/or presumptive diagnosis, (suspected or probable), related to the above clinical findings. Please include clinical findings supporting your diagnosis. If you DECLINE this query or would like to communicate with [facility], please utilize the \"[facility] message box\" at the TOP of the Progress Note on the right.       Thank you,  Francy Bruner RN [facility]  [phone]
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
## 2017-03-17 NOTE — ED NOTES
Patient sleeping at this time. Lights turned down for comfort. Side rail up x2. Remains on cardiac monitoring x3.
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
## 2017-03-17 NOTE — ED NOTES
Patient continues to rest comfortably during blood transfusion. Offered lunch but has refused at this time.
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
## 2017-03-17 NOTE — ED NOTES
Bedside shift change report given to Yue Rocha RN (oncoming nurse) by Miguel Angel Espitia RN (offgoing nurse). Report included the following information SBAR, Kardex, ED Summary, Intake/Output, MAR, Recent Results, Med Rec Status and Cardiac Rhythm NSR.

## 2017-03-17 NOTE — ED TRIAGE NOTES
Patient brought in via EMS for altered mental status. Patient comes from homeless shelter, staff state he became altered following patient going outside. . Iv attempt by medics. Upon arrival patient drowsy and falling asleep during triage, but oriented x3. Pinpointed pupils. Patient states he took 2 Percocet  at shelter, denies any other drug use tonight. Sepsis Screening completed    (  )Patient meets SIRS criteria. (x  )Patient does not meet SIRS criteria.       SIRS Criteria is achieved when two or more of the following are present   Temperature < 96.8°F (36°C) or > 100.9°F (38.3°C)   Heart Rate > 90 beats per minute   Respiratory Rate > 20 breaths per minute   WBC count > 12,000 or <4,000 or > 10% bands

## 2017-03-17 NOTE — PROGRESS NOTES
Readmission Risk Assessment:     Moderate Risk and MSSP/Good Help ACO patients    RRAT Score:  13-20    Initial Assessment:  Chart reviewed, pt admitted from homeless shelter for narcotic overdose, sickle cell crisis, EDGAR. Pt awaiting bed on Telemetry unit. CM will follow, should he be ready for discharge this weekend, please page on call CM  to assist with coordinating discharge if needed. Pt is homeless, pt can discharge back to shelter when stable, has insurance for any prescriptions. Nursing unit can assist with cab voucher if needed. Emergency Contact:  See face sheet    Pertinent Medical Hx:     ETOH abuse, suicidal ideation, drug abuse, sickle cell, Hep C, HTN    PCP/Specialists:        Community Services:       DME:          Moderate Risk Care Transition Plan:  1. Evaluate for [facility] or [facility], SNF, acute rehab, community care coordination of resources. 2. Involve patient/caregiver in assessment, planning, education and implement of intervention. 3. CM daily patient care huddles/interdisciplinary rounds. 4. PCP/Specialist appointment within 5  7 days made prior to discharge. 5. Facilitate transportation and logistics for follow-up appointments. 6. Medication reconciliation [id number] [facility]  7. Formal handoff between hospital provider and post-acute provider to transition patient  Handoff to [facility] Nurse Navigator or PCP practice.

## 2017-03-17 NOTE — ED NOTES
Patient remains on cardiac monitoring. Patient bending arm, instructed to keep arm straight for fluid administration. Patient verbalized understanding. Patient's lights turned off for comfort. Provided with remote for tv per request. Side rails up x2. VSS.

## 2017-03-17 NOTE — ED NOTES
Patient states feeling better. Fluids infusing without difficulties. Patient resting comfortably. Cardiac monitoring x3 remains.

## 2017-03-17 NOTE — ROUTINE PROCESS
Bedside and Verbal shift change report given to Fabrizio Rudd RN (oncoming nurse) by Rupert aDvis RN (offgoing nurse). Report included the following information SBAR, Kardex and MAR.

## 2017-03-17 NOTE — PROGRESS NOTES
1800 Pt arrived on unit via stretcher, VSS, pt is alert and oriented x4, SR on the monitor, lung sounds are clear on room air. Pt has been oriented to the room, call bell has been left within reach. Pt denies any further needs at this time. 1 Paged MD in regards to critical potassium of 6.3. Shift Summary- Pt experienced an uneventful shift. Bedside and Verbal shift change report given to EVERETTE Chance (oncoming nurse) by Concepcion Couch   (offgoing nurse).  Report included the following information SBAR, Kardex, ED Summary, Intake/Output, MAR, Recent Results and Cardiac Rhythm SR/St.

## 2017-03-17 NOTE — ROUTINE PROCESS
TRANSFER - IN REPORT:    Verbal report received from DYLAN Gauthier(name) on [employer]  being received from ED(unit) for routine progression of care      Report consisted of patients Situation, Background, Assessment and   Recommendations(SBAR). Information from the following report(s) SBAR, Kardex, ED Summary, Intake/Output, MAR, Recent Results and Cardiac Rhythm ST was reviewed with the receiving nurse. Opportunity for questions and clarification was provided. Assessment completed upon patients arrival to unit and care assumed.

## 2017-03-17 NOTE — PROGRESS NOTES
Hospitalist Progress Note    Patient: Nicole [place] MRN: [id number]  CSN: [id number]    YOB: 1960  Age: 55 y.o. 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 Give kayexalate if not improved  -Improving leucocytosis; unclear etiology. Clear x-ray upon review today. Neg UA. No other infectious symptoms (fever, chills, cough etc)  -pain control with toradol  -f/u am labs  -DVT ppx SCDs      Subjective:    No events over night. Patient doing well this AM.  No new complaints.     Review of systems:    Constitutional: denies fevers, chills, myalgias  Respiratory: denies SOB, cough  Cardiovascular: denies chest pain, palpitations  Gastrointestinal: denies nausea, vomiting, diarrhea      Vital signs/Intake and Output:  Visit Vitals    /78    Pulse 87    Temp 99.2 °F (37.3 °C)    Resp 13    Ht 5' 5\" (1.651 m)    Wt 65.8 kg (145 lb)    SpO2 96%    BMI 24.13 kg/m2     Current Shift:     Last three shifts:  03/15 1901 - 03/17 0700  In: - Out: 600 [Urine:600]    Exam:    General: Well developed, alert, NAD, OX3  Head/Neck: NCAT, supple, No masses, No lymphadenopathy  CVS:Regular rate and rhythm, no M/R/G, S1/S2 heard, no thrill  Lungs:Clear to auscultation bilaterally, no wheezes, rhonchi, or rales  Abdomen: Soft, Nontender, No distention, Normal Bowel sounds, No hepatomegaly  Extremities: No C/C/E, pulses palpable 2+  Skin:normal texture and turgor, no rashes, no lesions  Neuro:grossly normal , follows commands  Psych:appropriate                Labs: Results:       Chemistry Recent Labs      03/17/17   0500  03/16/17   2116   GLU  113*  118*   NA  137  133*   K  6.3*  5.8*   CL  110*  103   CO2  19*  23   BUN  30*  30*   CREA  2.65*  2.80*   CA  7.4*  8.0*   AGAP  8  7   BUCR  11*  11*   AP  88   --    TP  6.0*   --    ALB  2.2*   --    GLOB  3.8   --    AGRAT  0.6*   --       CBC w/Diff Recent Labs      03/17/17   0500  03/16/17 2116   WBC  24.5*  27.7*   RBC  1.82*  2.09*   HGB  5.2*  6.1*   HCT  15.5*  17.7*   PLT  197  221   GRANS  88*  79*   LYMPH  5*  11*   EOS  1  1      Cardiac Enzymes No results for input(s): CPK, CKND1, STEFAN in the last 72 hours. No lab exists for component: CKRMB, TROIP   Coagulation No results for input(s): PTP, INR, APTT in the last 72 hours. No lab exists for component: INREXT    Lipid Panel No results found for: CHOL, CHOLPOCT, CHOLX, CHLST, CHOLV, [id number], HDL, LDL, NLDLCT, DLDL, LDLC, DLDLP, [id number], VLDLC, VLDL, TGL, TGLX, TRIGL, [id number], TRIGP, TGLPOCT, [id number], CHHD, CHHDX   BNP No results for input(s): BNPP in the last 72 hours.    Liver Enzymes Recent Labs      03/17/17   0500   TP  6.0*   ALB  2.2*   AP  88   SGOT  21      Thyroid Studies Lab Results   Component Value Date/Time    TSH 0.82 10/23/2015 06:10 AM        Procedures/imaging: see electronic medical records for all procedures/Xrays and details which were not copied into this note but were reviewed prior to creation of [place], MD

## 2017-03-17 NOTE — PROGRESS NOTES
8934 Assumed care of patient at this time. Patient resting in NAD. Admission database and assessment charted on flow sheet. Patient is reporting to pain at this time, but is requesting a meal. Stretcher locked in lowest position and call bell is within reach. [address] Venita Iglesias on the phone with blood transfusion orders. Orders entered. 5764 Transfer orders upgraded to ICU.

## 2017-03-18 LAB
ABO + RH BLD: NORMAL
ALBUMIN SERPL BCP-MCNC: 2.1 G/DL (ref 3.4–5)
ALBUMIN/GLOB SERPL: 0.5 {RATIO} (ref 0.8–1.7)
ALP SERPL-CCNC: 70 U/L (ref 45–117)
ALT SERPL-CCNC: 15 U/L (ref 16–61)
ANION GAP BLD CALC-SCNC: 11 MMOL/L (ref 3–18)
ANTIGENS PRESENT BLD: NORMAL
ANTIGENS PRESENT RBC DONR: NORMAL
ANTIGENS PRESENT RBC DONR: NORMAL
AST SERPL W P-5'-P-CCNC: 34 U/L (ref 15–37)
ATRIAL RATE: 99 BPM
BASOPHILS # BLD AUTO: 0 K/UL (ref 0–0.1)
BASOPHILS # BLD: 0 % (ref 0–3)
BILIRUB SERPL-MCNC: 0.9 MG/DL (ref 0.2–1)
BLD PROD TYP BPU: NORMAL
BLD PROD TYP BPU: NORMAL
BLOOD BANK CMNT PATIENT-IMP: NORMAL
BLOOD GROUP ANTIBODIES SERPL: NORMAL
BPU ID: NORMAL
BPU ID: NORMAL
BUN SERPL-MCNC: 28 MG/DL (ref 7–18)
BUN/CREAT SERPL: 12 (ref 12–20)
CALCIUM SERPL-MCNC: 7.9 MG/DL (ref 8.5–10.1)
CALCULATED P AXIS, ECG09: 73 DEGREES
CALCULATED R AXIS, ECG10: 40 DEGREES
CALCULATED T AXIS, ECG11: 37 DEGREES
CALLED TO:,BCALL1: NORMAL
CHLORIDE SERPL-SCNC: 115 MMOL/L (ref 100–108)
CO2 SERPL-SCNC: 14 MMOL/L (ref 21–32)
CREAT SERPL-MCNC: 2.35 MG/DL (ref 0.6–1.3)
CROSSMATCH RESULT,%XM: NORMAL
CROSSMATCH RESULT,%XM: NORMAL
DIAGNOSIS, 93000: NORMAL
DIFFERENTIAL METHOD BLD: ABNORMAL
EOSINOPHIL # BLD: 0.7 K/UL (ref 0–0.4)
EOSINOPHIL NFR BLD: 3 % (ref 0–5)
ERYTHROCYTE [DISTWIDTH] IN BLOOD BY AUTOMATED COUNT: 16.9 % (ref 11.6–14.5)
GLOBULIN SER CALC-MCNC: 4.1 G/DL (ref 2–4)
GLUCOSE BLD STRIP.AUTO-MCNC: 103 MG/DL (ref 70–110)
GLUCOSE BLD STRIP.AUTO-MCNC: 275 MG/DL (ref 70–110)
GLUCOSE SERPL-MCNC: 57 MG/DL (ref 74–99)
HAPTOGLOB SERPL-MCNC: 74 MG/DL (ref 34–200)
HCT VFR BLD AUTO: 20.1 % (ref 36–48)
HGB BLD-MCNC: 7 G/DL (ref 13–16)
LYMPHOCYTES # BLD AUTO: 2 % (ref 20–51)
LYMPHOCYTES # BLD: 0.4 K/UL (ref 0.8–3.5)
MCH RBC QN AUTO: 29.3 PG (ref 24–34)
MCHC RBC AUTO-ENTMCNC: 34.8 G/DL (ref 31–37)
MCV RBC AUTO: 84.1 FL (ref 74–97)
MONOCYTES # BLD: 1.3 K/UL (ref 0–1)
MONOCYTES NFR BLD AUTO: 6 % (ref 2–9)
NEUTS SEG # BLD: 20 K/UL (ref 1.8–8)
NEUTS SEG NFR BLD AUTO: 89 % (ref 42–75)
P-R INTERVAL, ECG05: 102 MS
PHYSICIAN INSTRUCTIO,%PI: NORMAL
PLATELET # BLD AUTO: 217 K/UL (ref 135–420)
PLATELET COMMENTS,PCOM: ABNORMAL
PMV BLD AUTO: 10.1 FL (ref 9.2–11.8)
POTASSIUM SERPL-SCNC: 5.7 MMOL/L (ref 3.5–5.5)
PROT SERPL-MCNC: 6.2 G/DL (ref 6.4–8.2)
Q-T INTERVAL, ECG07: 324 MS
QRS DURATION, ECG06: 78 MS
QTC CALCULATION (BEZET), ECG08: 415 MS
RBC # BLD AUTO: 2.39 M/UL (ref 4.7–5.5)
RBC MORPH BLD: ABNORMAL
SODIUM SERPL-SCNC: 140 MMOL/L (ref 136–145)
SPECIMEN EXP DATE BLD: NORMAL
STATUS OF UNIT,%ST: NORMAL
STATUS OF UNIT,%ST: NORMAL
UNIT DIVISION, %UDIV: 0
UNIT DIVISION, %UDIV: 0
VENTRICULAR RATE, ECG03: 99 BPM
WBC # BLD AUTO: 22.4 K/UL (ref 4.6–13.2)

## 2017-03-18 PROCEDURE — 74011250636 HC RX REV CODE- 250/636: Performed by: INTERNAL MEDICINE

## 2017-03-18 PROCEDURE — 36415 COLL VENOUS BLD VENIPUNCTURE: CPT | Performed by: FAMILY MEDICINE

## 2017-03-18 PROCEDURE — 74011250636 HC RX REV CODE- 250/636: Performed by: FAMILY MEDICINE

## 2017-03-18 PROCEDURE — 74011250637 HC RX REV CODE- 250/637: Performed by: FAMILY MEDICINE

## 2017-03-18 PROCEDURE — 85025 COMPLETE CBC W/AUTO DIFF WBC: CPT | Performed by: FAMILY MEDICINE

## 2017-03-18 PROCEDURE — 83921 ORGANIC ACID SINGLE QUANT: CPT | Performed by: INTERNAL MEDICINE

## 2017-03-18 PROCEDURE — 65660000000 HC RM CCU STEPDOWN

## 2017-03-18 PROCEDURE — 80053 COMPREHEN METABOLIC PANEL: CPT | Performed by: FAMILY MEDICINE

## 2017-03-18 RX ORDER — MORPHINE SULFATE 2 MG/ML
2 INJECTION, SOLUTION INTRAMUSCULAR; INTRAVENOUS
Status: DISCONTINUED | OUTPATIENT
Start: 2017-03-18 | End: 2017-03-20

## 2017-03-18 RX ADMIN — AMLODIPINE BESYLATE 10 MG: 5 TABLET ORAL at 09:25

## 2017-03-18 RX ADMIN — SODIUM CHLORIDE 150 ML/HR: 900 INJECTION, SOLUTION INTRAVENOUS at 22:35

## 2017-03-18 RX ADMIN — DOXEPIN HYDROCHLORIDE 50 MG: 50 CAPSULE ORAL at 22:36

## 2017-03-18 RX ADMIN — Medication 2 MG: at 22:32

## 2017-03-18 RX ADMIN — CARVEDILOL 6.25 MG: 3.12 TABLET, FILM COATED ORAL at 09:25

## 2017-03-18 RX ADMIN — Medication 2 MG: at 13:08

## 2017-03-18 RX ADMIN — CARVEDILOL 6.25 MG: 3.12 TABLET, FILM COATED ORAL at 17:28

## 2017-03-18 RX ADMIN — ONDANSETRON 4 MG: 2 INJECTION INTRAMUSCULAR; INTRAVENOUS at 22:38

## 2017-03-18 RX ADMIN — Medication 10 ML: at 22:36

## 2017-03-18 RX ADMIN — Medication 2 MG: at 09:25

## 2017-03-18 RX ADMIN — KETOROLAC TROMETHAMINE 30 MG: 30 INJECTION, SOLUTION INTRAMUSCULAR at 20:48

## 2017-03-18 RX ADMIN — SODIUM CHLORIDE 150 ML/HR: 900 INJECTION, SOLUTION INTRAVENOUS at 13:02

## 2017-03-18 RX ADMIN — Medication 2 MG: at 17:27

## 2017-03-18 RX ADMIN — KETOROLAC TROMETHAMINE 30 MG: 30 INJECTION, SOLUTION INTRAMUSCULAR at 13:08

## 2017-03-18 RX ADMIN — TRAZODONE HYDROCHLORIDE 50 MG: 50 TABLET ORAL at 22:38

## 2017-03-18 RX ADMIN — Medication 10 ML: at 13:02

## 2017-03-18 RX ADMIN — KETOROLAC TROMETHAMINE 30 MG: 30 INJECTION, SOLUTION INTRAMUSCULAR at 06:20

## 2017-03-18 RX ADMIN — FOLIC ACID 1 MG: 1 TABLET ORAL at 09:25

## 2017-03-18 RX ADMIN — VITAM B12 100 MCG: 100 TAB at 09:25

## 2017-03-18 RX ADMIN — Medication 100 MG: at 09:25
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
## 2017-03-18 NOTE — PROGRESS NOTES
1527 - Assumed care of patient at this time. Patient in bed watching TV. Pain 4/10. IV to right AC intact and patent. Lungs clear. Bowel sounds active to all quadrants. SCDs in place bilaterally. CIWA score 2 at this time. 1727 - Pain 8/10. PRN morphine pain medication administered at this time. Patient has been educated on side effects. 1949 - Bedside and Verbal shift change report given to Brooklynn Tate RN by Erwin Gonzalez RN. Report included the following information SBAR, Kardex, OR Summary, Intake/Output and MAR.

## 2017-03-18 NOTE — ROUTINE PROCESS
Bedside and Verbal shift change report given to ASHLEY Pedraza (oncoming nurse) by Syed (offgoing nurse). Report included the following information SBAR, Kardex, Intake/Output, MAR, Recent Results and Cardiac Rhythm NSR.

## 2017-03-18 NOTE — ROUTINE PROCESS
Bedside and Verbal shift change report given to Baljit Adame (oncoming nurses) by Pradip Gordillo (offgoing nurse). Report given with SBAR, Kardex, Intake/Output, MAR and Recent Results.

## 2017-03-18 NOTE — PROGRESS NOTES
Hospitalist Progress Note    Patient: Alirio Navarro MRN: [id number]  CSN: [id number]    YOB: 1960  Age: 55 y.o. 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 Given insulin and dextrose over night for K of 6.3  -EDGAR; improving; Cr 2.35  -Sickle cell anemia with crisis. H/H 7.0/20.1. S/p blood transfusion. Heme/onc following  -narcotic overdose  -alcohol abuse; on [facility] protocol  -hyponatremia; resolved.  -Leucocytosis; unclear etiology. Neg UA. No other infectious symptoms (fever, chills, cough etc)  -pain control with toradol  -DVT ppx SCDs        Subjective:     No events over night. Patient doing well this AM. No new complaints.         Review of systems:    Constitutional: denies fevers, chills, myalgias  Respiratory: denies SOB, cough  Cardiovascular: denies chest pain, palpitations  Gastrointestinal: denies nausea, vomiting, diarrhea      Vital signs/Intake and Output:  Visit Vitals    /89 (BP 1 Location: Left arm, BP Patient Position: At rest)    Pulse 93    Temp 99 °F (37.2 °C)    Resp 18    Ht 5' 5\" (1.651 m)    Wt 67.1 kg (148 lb)    SpO2 100%    BMI 24.63 kg/m2     Current Shift:  03/18 0701 - 03/18 1900  In: -   Out: 400 [Urine:400]  Last three shifts:  03/16 1901 - 03/18 0700  In: 240 [P.O.:240]  Out: 600 [Urine:600]    Exam:    General: Well developed, alert, NAD, OX3  Head/Neck: NCAT, supple, No masses, No lymphadenopathy  CVS:Regular rate and rhythm, no M/R/G, S1/S2 heard, no thrill  Lungs:Clear to auscultation bilaterally, no wheezes, rhonchi, or rales  Abdomen: Soft, Nontender, No distention, Normal Bowel sounds, No hepatomegaly  Extremities: No C/C/E, pulses palpable 2+  Skin:normal texture and turgor, no rashes, no lesions  Neuro:grossly normal , follows commands  Psych:appropriate                Labs: Results:       Chemistry Recent Labs      03/18/17   0340  03/17/17   1530  03/17/17   0500   GLU  57*  96  113*   NA  140  139  137   K  5.7*  6.3*  6.3*   CL  115*  111*  110*   CO2  14*  18*  19*   BUN  28*  28*  30*   CREA  2.35*  2.41*  2.65*   CA  7.9*  8.0*  7.4*   AGAP  11  10  8   BUCR  12  12  11*   AP  70  67  88   TP  6.2*  6.1*  6.0*   ALB  2.1*  2.2*  2.2*   GLOB  4.1*  3.9  3.8   AGRAT  0.5*  0.6*  0.6*      CBC w/Diff Recent Labs      03/18/17   0746  03/17/17   1530  03/17/17   0500  03/16/17   2116   WBC  22.4*   --   24.5*  27.7*   RBC  2.39*   --   1.82*  2.09*   HGB  7.0*  7.1*  5.2*  6.1*   HCT  20.1*  20.6*  15.5*  17.7*   PLT  217   --   197  221   GRANS  PENDING   --   88*  79*   LYMPH  PENDING   --   5*  11*   EOS  PENDING   --   1  1      Cardiac Enzymes No results for input(s): CPK, CKND1, STEFAN in the last 72 hours. No lab exists for component: CKRMB, TROIP   Coagulation No results for input(s): PTP, INR, APTT in the last 72 hours. No lab exists for component: INREXT    Lipid Panel No results found for: CHOL, CHOLPOCT, CHOLX, CHLST, CHOLV, [id number], HDL, LDL, NLDLCT, DLDL, LDLC, DLDLP, [id number], VLDLC, VLDL, TGL, TGLX, TRIGL, [id number], TRIGP, TGLPOCT, [id number], CHHD, CHHDX   BNP No results for input(s): BNPP in the last 72 hours.    Liver Enzymes Recent Labs      03/18/17   0340   TP  6.2*   ALB  2.1*   AP  70   SGOT  34      Thyroid Studies Lab Results   Component Value Date/Time    TSH 0.82 10/23/2015 06:10 AM        Procedures/imaging: see electronic medical records for all procedures/Xrays and details which were not copied into this note but were reviewed prior to creation of Trudy Marquez MD

## 2017-03-18 NOTE — PROGRESS NOTES
0800 Assumed care of patient from off-going nurse Marvin Bruce.    2586 Dr Randa Vuong on unit, discussed patient complaining of \"waist pain\" not relieved by toradol. Orders received for Morphine 2 mg iv push prn.    0925 Morphine 2 mg iv push prn given for complaints of \"waist pain\". See doc-flow sheet for follow up.    1308 Toradol 30 mg iv push and Morphine 2 mg iv push given for complaints of \"waist pain\". See doc-flow sheet for follow up.    1500 Patient calm, resting between care, vital signs stable, NSR on telemetry. CIWA score less than 8, patient educated on symptoms of withdrawal, prn Ativan available.

## 2017-03-19 ENCOUNTER — APPOINTMENT (OUTPATIENT)
Dept: GENERAL RADIOLOGY | Age: 57
DRG: 662 | End: 2017-03-19
Attending: FAMILY MEDICINE
Payer: MEDICAID

## 2017-03-19 LAB
ALBUMIN SERPL BCP-MCNC: 1.9 G/DL (ref 3.4–5)
ALBUMIN/GLOB SERPL: 0.5 {RATIO} (ref 0.8–1.7)
ALP SERPL-CCNC: 67 U/L (ref 45–117)
ALT SERPL-CCNC: 10 U/L (ref 16–61)
ANION GAP BLD CALC-SCNC: 10 MMOL/L (ref 3–18)
APPEARANCE UR: CLEAR
APTT PPP: 37 SEC (ref 23–36.4)
AST SERPL W P-5'-P-CCNC: 21 U/L (ref 15–37)
BACTERIA URNS QL MICRO: NEGATIVE /HPF
BASOPHILS # BLD AUTO: 0 K/UL (ref 0–0.1)
BASOPHILS # BLD: 0 % (ref 0–3)
BILIRUB SERPL-MCNC: 0.8 MG/DL (ref 0.2–1)
BILIRUB UR QL: NEGATIVE
BUN SERPL-MCNC: 23 MG/DL (ref 7–18)
BUN/CREAT SERPL: 10 (ref 12–20)
CALCIUM SERPL-MCNC: 7.6 MG/DL (ref 8.5–10.1)
CHLORIDE SERPL-SCNC: 116 MMOL/L (ref 100–108)
CO2 SERPL-SCNC: 16 MMOL/L (ref 21–32)
COLOR UR: YELLOW
CREAT SERPL-MCNC: 2.32 MG/DL (ref 0.6–1.3)
CRP SERPL-MCNC: 8.3 MG/DL (ref 0–0.3)
D DIMER PPP FEU-MCNC: 4.97 UG/ML(FEU)
DIFFERENTIAL METHOD BLD: ABNORMAL
EOSINOPHIL # BLD: 0 K/UL (ref 0–0.4)
EOSINOPHIL NFR BLD: 0 % (ref 0–5)
EPITH CASTS URNS QL MICRO: NORMAL /LPF (ref 0–5)
ERYTHROCYTE [DISTWIDTH] IN BLOOD BY AUTOMATED COUNT: 17.3 % (ref 11.6–14.5)
FIBRINOGEN PPP-MCNC: 432 MG/DL (ref 210–451)
GLOBULIN SER CALC-MCNC: 3.6 G/DL (ref 2–4)
GLUCOSE SERPL-MCNC: 99 MG/DL (ref 74–99)
GLUCOSE UR STRIP.AUTO-MCNC: NEGATIVE MG/DL
HCT VFR BLD AUTO: 18.7 % (ref 36–48)
HGB BLD-MCNC: 6.4 G/DL (ref 13–16)
HGB UR QL STRIP: NEGATIVE
INR PPP: 1.2 (ref 0.8–1.2)
KETONES UR QL STRIP.AUTO: NEGATIVE MG/DL
LACTATE SERPL-SCNC: 0.6 MMOL/L (ref 0.4–2)
LACTATE SERPL-SCNC: 0.7 MMOL/L (ref 0.4–2)
LEUKOCYTE ESTERASE UR QL STRIP.AUTO: NEGATIVE
LYMPHOCYTES # BLD AUTO: 4 % (ref 20–51)
LYMPHOCYTES # BLD: 0.8 K/UL (ref 0.8–3.5)
MCH RBC QN AUTO: 29 PG (ref 24–34)
MCHC RBC AUTO-ENTMCNC: 34.2 G/DL (ref 31–37)
MCV RBC AUTO: 84.6 FL (ref 74–97)
MONOCYTES # BLD: 2.1 K/UL (ref 0–1)
MONOCYTES NFR BLD AUTO: 10 % (ref 2–9)
NEUTS BAND NFR BLD MANUAL: 2 % (ref 0–5)
NEUTS SEG # BLD: 17.3 K/UL (ref 1.8–8)
NEUTS SEG NFR BLD AUTO: 84 % (ref 42–75)
NITRITE UR QL STRIP.AUTO: NEGATIVE
NRBC BLD-RTO: 1 PER 100 WBC
PH UR STRIP: 6 [PH] (ref 5–8)
PLATELET # BLD AUTO: 213 K/UL (ref 135–420)
PLATELET COMMENTS,PCOM: ABNORMAL
PMV BLD AUTO: 9.9 FL (ref 9.2–11.8)
POTASSIUM SERPL-SCNC: 4.9 MMOL/L (ref 3.5–5.5)
PROT SERPL-MCNC: 5.5 G/DL (ref 6.4–8.2)
PROT UR STRIP-MCNC: 300 MG/DL
PROTHROMBIN TIME: 15.1 SEC (ref 11.5–15.2)
RBC # BLD AUTO: 2.21 M/UL (ref 4.7–5.5)
RBC #/AREA URNS HPF: NORMAL /HPF (ref 0–5)
RBC MORPH BLD: ABNORMAL
SODIUM SERPL-SCNC: 142 MMOL/L (ref 136–145)
SP GR UR REFRACTOMETRY: 1.01 (ref 1–1.03)
TSH SERPL DL<=0.05 MIU/L-ACNC: 1.08 UIU/ML (ref 0.36–3.74)
UROBILINOGEN UR QL STRIP.AUTO: 1 EU/DL (ref 0.2–1)
WBC # BLD AUTO: 20.6 K/UL (ref 4.6–13.2)
WBC URNS QL MICRO: NORMAL /HPF (ref 0–5)

## 2017-03-19 PROCEDURE — 85610 PROTHROMBIN TIME: CPT | Performed by: INTERNAL MEDICINE

## 2017-03-19 PROCEDURE — 81001 URINALYSIS AUTO W/SCOPE: CPT | Performed by: FAMILY MEDICINE

## 2017-03-19 PROCEDURE — 86140 C-REACTIVE PROTEIN: CPT | Performed by: INTERNAL MEDICINE

## 2017-03-19 PROCEDURE — 74011250636 HC RX REV CODE- 250/636: Performed by: FAMILY MEDICINE

## 2017-03-19 PROCEDURE — 71010 XR CHEST PORT: CPT

## 2017-03-19 PROCEDURE — 77030033269 HC SLV COMPR SCD KNE2 CARD -B

## 2017-03-19 PROCEDURE — 87040 BLOOD CULTURE FOR BACTERIA: CPT | Performed by: FAMILY MEDICINE

## 2017-03-19 PROCEDURE — 80053 COMPREHEN METABOLIC PANEL: CPT | Performed by: FAMILY MEDICINE

## 2017-03-19 PROCEDURE — 74011250636 HC RX REV CODE- 250/636: Performed by: INTERNAL MEDICINE

## 2017-03-19 PROCEDURE — 94760 N-INVAS EAR/PLS OXIMETRY 1: CPT

## 2017-03-19 PROCEDURE — 65660000000 HC RM CCU STEPDOWN

## 2017-03-19 PROCEDURE — 74011250637 HC RX REV CODE- 250/637: Performed by: FAMILY MEDICINE

## 2017-03-19 PROCEDURE — 74011000250 HC RX REV CODE- 250: Performed by: FAMILY MEDICINE

## 2017-03-19 PROCEDURE — 83605 ASSAY OF LACTIC ACID: CPT | Performed by: FAMILY MEDICINE

## 2017-03-19 PROCEDURE — 85379 FIBRIN DEGRADATION QUANT: CPT | Performed by: INTERNAL MEDICINE

## 2017-03-19 PROCEDURE — 85384 FIBRINOGEN ACTIVITY: CPT | Performed by: INTERNAL MEDICINE

## 2017-03-19 PROCEDURE — 94640 AIRWAY INHALATION TREATMENT: CPT

## 2017-03-19 PROCEDURE — 77030027138 HC INCENT SPIROMETER -A

## 2017-03-19 PROCEDURE — 36415 COLL VENOUS BLD VENIPUNCTURE: CPT | Performed by: FAMILY MEDICINE

## 2017-03-19 PROCEDURE — 85025 COMPLETE CBC W/AUTO DIFF WBC: CPT | Performed by: FAMILY MEDICINE

## 2017-03-19 PROCEDURE — 85730 THROMBOPLASTIN TIME PARTIAL: CPT | Performed by: INTERNAL MEDICINE

## 2017-03-19 PROCEDURE — 84443 ASSAY THYROID STIM HORMONE: CPT | Performed by: FAMILY MEDICINE

## 2017-03-19 PROCEDURE — 83605 ASSAY OF LACTIC ACID: CPT | Performed by: INTERNAL MEDICINE

## 2017-03-19 RX ORDER — IPRATROPIUM BROMIDE AND ALBUTEROL SULFATE 2.5; .5 MG/3ML; MG/3ML
3 SOLUTION RESPIRATORY (INHALATION)
Status: DISCONTINUED | OUTPATIENT
Start: 2017-03-19 | End: 2017-03-29 | Stop reason: HOSPADM

## 2017-03-19 RX ORDER — LEVOFLOXACIN 5 MG/ML
500 INJECTION, SOLUTION INTRAVENOUS EVERY 24 HOURS
Status: DISCONTINUED | OUTPATIENT
Start: 2017-03-19 | End: 2017-03-21

## 2017-03-19 RX ADMIN — Medication 2 MG: at 12:00

## 2017-03-19 RX ADMIN — Medication 10 ML: at 21:32

## 2017-03-19 RX ADMIN — SODIUM CHLORIDE 150 ML/HR: 900 INJECTION, SOLUTION INTRAVENOUS at 12:07

## 2017-03-19 RX ADMIN — Medication 100 MG: at 08:40

## 2017-03-19 RX ADMIN — LEVOFLOXACIN 500 MG: 5 INJECTION, SOLUTION INTRAVENOUS at 21:31

## 2017-03-19 RX ADMIN — Medication 10 ML: at 14:43

## 2017-03-19 RX ADMIN — SODIUM CHLORIDE 150 ML/HR: 900 INJECTION, SOLUTION INTRAVENOUS at 07:56

## 2017-03-19 RX ADMIN — CARVEDILOL 6.25 MG: 3.12 TABLET, FILM COATED ORAL at 17:26

## 2017-03-19 RX ADMIN — VITAM B12 100 MCG: 100 TAB at 08:41

## 2017-03-19 RX ADMIN — KETOROLAC TROMETHAMINE 30 MG: 30 INJECTION, SOLUTION INTRAMUSCULAR at 14:37

## 2017-03-19 RX ADMIN — AMLODIPINE BESYLATE 10 MG: 5 TABLET ORAL at 08:41

## 2017-03-19 RX ADMIN — KETOROLAC TROMETHAMINE 30 MG: 30 INJECTION, SOLUTION INTRAMUSCULAR at 20:20

## 2017-03-19 RX ADMIN — Medication 2 MG: at 17:31

## 2017-03-19 RX ADMIN — Medication 2 MG: at 02:54

## 2017-03-19 RX ADMIN — DOXEPIN HYDROCHLORIDE 50 MG: 50 CAPSULE ORAL at 21:30

## 2017-03-19 RX ADMIN — IPRATROPIUM BROMIDE AND ALBUTEROL SULFATE 3 ML: .5; 3 SOLUTION RESPIRATORY (INHALATION) at 20:26

## 2017-03-19 RX ADMIN — FOLIC ACID 1 MG: 1 TABLET ORAL at 08:41

## 2017-03-19 RX ADMIN — CARVEDILOL 6.25 MG: 3.12 TABLET, FILM COATED ORAL at 08:41

## 2017-03-19 RX ADMIN — Medication 2 MG: at 07:56

## 2017-03-19 RX ADMIN — ONDANSETRON 4 MG: 2 INJECTION INTRAMUSCULAR; INTRAVENOUS at 20:20

## 2017-03-19 NOTE — ROUTINE PROCESS
0721-Bedside and Verbal shift change report given to Dalia FERNANDEZ/SAHLEY Mcnair RN (oncoming nurse) by EVERETTE Raines RN (offgoing nurse). Report included the following information SBAR, Kardex, Intake/Output and MAR.

## 2017-03-19 NOTE — PROGRESS NOTES
1950:  Assumed care of patient. Patient resting in bed. Call bell and phone left in reach. 2238:  PRN zofran given for c/o nausea. PRN trazadone given for sleep per patient request.     5783:  Patient resting in bed, sleeping. Shift summary:  Patient with pain and nausea overnight. Medicated per MAR. CIWA remained <8. Patient remained with low grade temp. Patient left in no acute distress with call light and phone within reach.

## 2017-03-19 NOTE — ROUTINE PROCESS
1923-Bedside and Verbal shift change report given to EVERETTE Farah RN (oncoming nurse) by EMEKA FERNANDEZ/ASHLEY Brooks RN (offgoing nurse). Report included the following information SBAR, Kardex, Intake/Output and MAR.

## 2017-03-19 NOTE — ROUTINE PROCESS
Bedside and Verbal shift change report given to Ioana Melvin RN (oncoming nurse) by Tushar Mcginnis RN (offgoing nurse). Report given with SBAR, Procedure Summary, Intake/Output, MAR and Recent Results.
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
## 2017-03-19 NOTE — PROGRESS NOTES
Hospitalist Progress Note    Patient: Edgar Layne MRN: [id number]  CSN: [id number]    YOB: 1960  Age: 55 y.o. 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 K 4.9 this morning. -EDGAR; improving; Cr 2.32  -Sickle cell anemia with crisis. S/p blood transfusion. H/H 6.4/18. 7. Defer transfusion to hematology. Heme/onc following  -narcotic overdose  -alcohol abuse; on [facility] protocol  -hyponatremia; resolved.  -Leukocytosis; improving; unclear etiology. Neg UA. No other infectious symptoms (fever, chills, cough etc)  -pain control with toradol  -DVT ppx SCDs          Subjective:      No events over night. C/o generalized aches and pains. No new complaints.         Review of systems:    Constitutional: denies fevers, chills  Respiratory: denies SOB, cough  Cardiovascular: denies chest pain, palpitations  Gastrointestinal: denies nausea, vomiting, diarrhea      Vital signs/Intake and Output:  Visit Vitals    /73 (BP 1 Location: Left arm, BP Patient Position: At rest)    Pulse 98    Temp 99.3 °F (37.4 °C)    Resp 18    Ht 5' 5\" (1.651 m)    Wt 68.6 kg (151 lb 3.2 oz)    SpO2 97%    BMI 25.16 kg/m2 Current Shift:     Last three shifts:  03/17 1901 - 03/19 0700  In: 4252.5 [P.O.:1320; I.V.:2932.5]  Out: 2370 [Urine:2370]    Exam:    General: Well developed, alert, NAD, OX3  Head/Neck: NCAT, supple, No masses, No lymphadenopathy  CVS:Regular rate and rhythm, no M/R/G, S1/S2 heard, no thrill  Lungs:Clear to auscultation bilaterally, no wheezes, rhonchi, or rales  Abdomen: Soft, Nontender, No distention, Normal Bowel sounds, No hepatomegaly  Extremities: No C/C/E, pulses palpable 2+  Skin:normal texture and turgor, no rashes, no lesions  Neuro:grossly normal , follows commands  Psych:appropriate                Labs: Results:       Chemistry Recent Labs      03/19/17   0605  03/18/17   0340  03/17/17   1530   GLU  99  57*  96   NA  142  140  139   K  4.9  5.7*  6.3*   CL  116*  115*  111*   CO2  16*  14*  18*   BUN  23*  28*  28*   CREA  2.32*  2.35*  2.41*   CA  7.6*  7.9*  8.0*   AGAP  10  11  10   BUCR  10*  12  12   AP  67  70  67   TP  5.5*  6.2*  6.1*   ALB  1.9*  2.1*  2.2*   GLOB  3.6  4.1*  3.9   AGRAT  0.5*  0.5*  0.6*      CBC w/Diff Recent Labs      03/19/17   0605  03/18/17   0746  03/17/17   1530  03/17/17   0500   WBC  20.6*  22.4*   --   24.5*   RBC  2.21*  2.39*   --   1.82*   HGB  6.4*  7.0*  7.1*  5.2*   HCT  18.7*  20.1*  20.6*  15.5*   PLT  213  217   --   197   GRANS  84*  89*   --   88*   LYMPH  4*  2*   --   5*   EOS  0  3   --   1      Cardiac Enzymes No results for input(s): CPK, CKND1, STEFAN in the last 72 hours. No lab exists for component: CKRMB, TROIP   Coagulation No results for input(s): PTP, INR, APTT in the last 72 hours. No lab exists for component: INREXT    Lipid Panel No results found for: CHOL, CHOLPOCT, CHOLX, CHLST, CHOLV, [id number], HDL, LDL, NLDLCT, DLDL, LDLC, DLDLP, 115813, VLDLC, VLDL, TGL, TGLX, TRIGL, [id number], TRIGP, TGLPOCT, [id number], CHHD, CHHDX   BNP No results for input(s): BNPP in the last 72 hours.    Liver Enzymes Recent Labs      03/19/17   0605   TP  5.5* ALB  1.9*   AP  67   SGOT  21      Thyroid Studies Lab Results   Component Value Date/Time    TSH 0.82 10/23/2015 06:10 AM        Procedures/imaging: see electronic medical records for all procedures/Xrays and details which were not copied into this note but were reviewed prior to creation of Matthew Guerrero MD

## 2017-03-19 NOTE — PROGRESS NOTES
9592 - Patient in bed at this time. A/O x 4. IV to right AC  intact and patent. SCDs to bilateral legs. No numbness/tingling. Pedal and radial pulses palpable. Lungs CTA. Bowel sounds active to all quadrants. Pain 0/10. Patient with pitting edema of +1 to right hip.    1200-Patient with c/o pain to right hip rated 8/10. Morphine IV given. 1304-Patient resting in bed with eyes closed, breathing even and unlabored. 1427-Patient c/o pain rated 8/10 to right hip. Toradol given. Incentive spirometer used, with nurse assist, at levels of 750-1000.    1731-Pain medication given d/t c/o pain to hip rated 8/10.    1830-Patient resting with NAD at this time. Shift summary-New orders for Duplex tomorrow. No other changes in health status. Pain medication given for pain to right hip, edema continues at 1+. Patient did use incentive spirometer. IV site is now in left [facility] d/t leaking at right [facility], which was discontinued.

## 2017-03-19 NOTE — PROGRESS NOTES
conducted an initial consultation and Spiritual Assessment for Harley Cottrell, who is a 55 y. o.,male. Patients Primary Language is: [place]. According to the patients EMR Islam Affiliation is: [place]. 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 ([facility] [address] 10/26/2015    MDD (major depressive disorder) 10/26/2015    Suicidal ideation 10/22/2015    Dislocation of hip joint prosthesis ([facility] [address] 11/06/2014    Avascular necrosis of bone of right hip ([facility] [address] 11/04/2014    Sickle cell anemia ([facility] [address] 11/04/2014    ETOH abuse 11/04/2014    Chronic hepatitis C ([facility] [address] 11/04/2014    Avascular necrosis of hip ([facility] [address] 11/04/2014        The  provided the following Interventions:  Initiated a relationship of care and support. Explored issues of stephanie, belief, spirituality and Cheondoism/ritual needs while hospitalized. Listened empathically. Provided chaplaincy education. Provided information about Spiritual Care Services. Offered prayer and assurance of continued prayers on patient's behalf. Chart reviewed. The following outcomes were achieved:  Patient shared limited information about both their medical narrative and spiritual journey/beliefs. Patient processed feeling about current hospitalization. Patient expressed gratitude for the 's visit. Assessment:  Patient does not have any Cheondoism/cultural needs that will affect patients preferences in health care. Plan:  Chaplains will continue to follow and will provide pastoral care on an as needed/requested basis.    recommends bedside caregivers page  on duty if patient shows signs of acute spiritual or emotional distress.      Deena Bañuelos

## 2017-03-20 ENCOUNTER — APPOINTMENT (OUTPATIENT)
Dept: GENERAL RADIOLOGY | Age: 57
DRG: 662 | End: 2017-03-20
Attending: HOSPITALIST
Payer: MEDICAID

## 2017-03-20 ENCOUNTER — APPOINTMENT (OUTPATIENT)
Dept: ULTRASOUND IMAGING | Age: 57
DRG: 662 | End: 2017-03-20
Attending: HOSPITALIST
Payer: MEDICAID

## 2017-03-20 PROBLEM — L02.31 ABSCESS OF BUTTOCK, RIGHT: Status: ACTIVE | Noted: 2017-03-20

## 2017-03-20 PROBLEM — M25.551 PAIN OF RIGHT HIP JOINT: Status: ACTIVE | Noted: 2017-03-20

## 2017-03-20 PROBLEM — J90 BILATERAL PLEURAL EFFUSION: Status: ACTIVE | Noted: 2017-03-20

## 2017-03-20 LAB
BASOPHILS # BLD AUTO: 0 K/UL (ref 0–0.1)
BASOPHILS # BLD: 0 % (ref 0–3)
DIFFERENTIAL METHOD BLD: ABNORMAL
EOSINOPHIL # BLD: 0 K/UL (ref 0–0.4)
EOSINOPHIL NFR BLD: 0 % (ref 0–5)
ERYTHROCYTE [DISTWIDTH] IN BLOOD BY AUTOMATED COUNT: 17.5 % (ref 11.6–14.5)
HCT VFR BLD AUTO: 19.7 % (ref 36–48)
HGB BLD-MCNC: 6.9 G/DL (ref 13–16)
KAPPA LC FREE SER-MCNC: 115.86 MG/L (ref 3.3–19.4)
KAPPA LC FREE/LAMBDA FREE SER: 1.71 {RATIO} (ref 0.26–1.65)
LAMBDA LC FREE SERPL-MCNC: 67.78 MG/L (ref 5.71–26.3)
LDH SERPL L TO P-CCNC: 219 U/L (ref 81–234)
LYMPHOCYTES # BLD AUTO: 6 % (ref 20–51)
LYMPHOCYTES # BLD: 1.7 K/UL (ref 0.8–3.5)
MCH RBC QN AUTO: 29.5 PG (ref 24–34)
MCHC RBC AUTO-ENTMCNC: 35 G/DL (ref 31–37)
MCV RBC AUTO: 84.2 FL (ref 74–97)
MONOCYTES # BLD: 1.4 K/UL (ref 0–1)
MONOCYTES NFR BLD AUTO: 5 % (ref 2–9)
NEUTS BAND NFR BLD MANUAL: 7 % (ref 0–5)
NEUTS SEG # BLD: 22.9 K/UL (ref 1.8–8)
NEUTS SEG NFR BLD AUTO: 82 % (ref 42–75)
PERIPHERAL SMEAR,PSM: NORMAL
PLATELET # BLD AUTO: 245 K/UL (ref 135–420)
PMV BLD AUTO: 9.4 FL (ref 9.2–11.8)
RBC # BLD AUTO: 2.34 M/UL (ref 4.7–5.5)
RBC MORPH BLD: ABNORMAL
WBC # BLD AUTO: 27.9 K/UL (ref 4.6–13.2)
WBC MORPH BLD: ABNORMAL

## 2017-03-20 PROCEDURE — 83615 LACTATE (LD) (LDH) ENZYME: CPT | Performed by: HOSPITALIST

## 2017-03-20 PROCEDURE — 85025 COMPLETE CBC W/AUTO DIFF WBC: CPT | Performed by: HOSPITALIST

## 2017-03-20 PROCEDURE — 74011250636 HC RX REV CODE- 250/636: Performed by: FAMILY MEDICINE

## 2017-03-20 PROCEDURE — 65660000000 HC RM CCU STEPDOWN

## 2017-03-20 PROCEDURE — 74011250636 HC RX REV CODE- 250/636: Performed by: HOSPITALIST

## 2017-03-20 PROCEDURE — 36415 COLL VENOUS BLD VENIPUNCTURE: CPT | Performed by: HOSPITALIST

## 2017-03-20 PROCEDURE — 93306 TTE W/DOPPLER COMPLETE: CPT

## 2017-03-20 PROCEDURE — 73502 X-RAY EXAM HIP UNI 2-3 VIEWS: CPT

## 2017-03-20 PROCEDURE — 74011250637 HC RX REV CODE- 250/637: Performed by: FAMILY MEDICINE

## 2017-03-20 PROCEDURE — 76882 US LMTD JT/FCL EVL NVASC XTR: CPT

## 2017-03-20 PROCEDURE — 93971 EXTREMITY STUDY: CPT

## 2017-03-20 RX ORDER — ACETAMINOPHEN 325 MG/1
650 TABLET ORAL
Status: DISCONTINUED | OUTPATIENT
Start: 2017-03-20 | End: 2017-03-29 | Stop reason: HOSPADM

## 2017-03-20 RX ORDER — MEGESTROL ACETATE 40 MG/ML
200 SUSPENSION ORAL DAILY
Status: DISCONTINUED | OUTPATIENT
Start: 2017-03-21 | End: 2017-03-29 | Stop reason: HOSPADM

## 2017-03-20 RX ORDER — MORPHINE SULFATE 2 MG/ML
2 INJECTION, SOLUTION INTRAMUSCULAR; INTRAVENOUS
Status: DISCONTINUED | OUTPATIENT
Start: 2017-03-20 | End: 2017-03-25

## 2017-03-20 RX ADMIN — Medication 100 MG: at 09:24

## 2017-03-20 RX ADMIN — LEVOFLOXACIN 500 MG: 5 INJECTION, SOLUTION INTRAVENOUS at 22:32

## 2017-03-20 RX ADMIN — Medication 2 MG: at 19:01

## 2017-03-20 RX ADMIN — Medication 10 ML: at 16:51

## 2017-03-20 RX ADMIN — ACETAMINOPHEN 650 MG: 325 TABLET ORAL at 19:13

## 2017-03-20 RX ADMIN — Medication 2 MG: at 12:30

## 2017-03-20 RX ADMIN — VANCOMYCIN HYDROCHLORIDE 1250 MG: 10 INJECTION, POWDER, LYOPHILIZED, FOR SOLUTION INTRAVENOUS at 20:05

## 2017-03-20 RX ADMIN — Medication 2 MG: at 00:38

## 2017-03-20 RX ADMIN — Medication 10 ML: at 22:00

## 2017-03-20 RX ADMIN — ONDANSETRON 4 MG: 2 INJECTION INTRAMUSCULAR; INTRAVENOUS at 19:13

## 2017-03-20 RX ADMIN — Medication 2 MG: at 16:01

## 2017-03-20 RX ADMIN — CARVEDILOL 6.25 MG: 3.12 TABLET, FILM COATED ORAL at 16:50

## 2017-03-20 RX ADMIN — Medication 2 MG: at 22:29

## 2017-03-20 RX ADMIN — AMLODIPINE BESYLATE 10 MG: 5 TABLET ORAL at 09:24

## 2017-03-20 RX ADMIN — VITAM B12 100 MCG: 100 TAB at 09:24

## 2017-03-20 RX ADMIN — Medication 2 MG: at 09:25

## 2017-03-20 RX ADMIN — CARVEDILOL 6.25 MG: 3.12 TABLET, FILM COATED ORAL at 09:24

## 2017-03-20 RX ADMIN — Medication 10 ML: at 05:00

## 2017-03-20 RX ADMIN — Medication 2 MG: at 05:04

## 2017-03-20 RX ADMIN — FOLIC ACID 1 MG: 1 TABLET ORAL at 09:25

## 2017-03-20 NOTE — PROGRESS NOTES
Hospitalist Progress Note-critical care note     Patient: Sarahi Ramesh MRN: [id number]  CSN: [id number]    YOB: 1960  Age: 55 y.o. 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 CKD 3 stable. Cr was 2.39 in 2016   will continue monitoring , bmp today   3 Sickle cell anemia with crisis. S/p blood transfusion. H/H 6.9/19.7  Will hold transfusion  4. narcotic overdose  5 alcohol abuse; on [facility] protocol. Doing well, stopped ciwa protoc   6 . Leukocytosis; improving; unclear etiology   will have ultrasound of rt buttock to r/o abscess ,add vanc   7 hip pain   X-ray to r/o fracture or necrotizing hip   8 ,bilateral effusion   will have echo   9 soft tissue abscess   add vanc and need I &d per surgeon   pvl negative for dvt     Subjective: pain in rt hip and rt buttock   Nurse: bump on rt buttock   Review of systems:    General: No fevers or chills. Cardiovascular: No chest pain or pressure. No palpitations. Pulmonary: No shortness of breath. Gastrointestinal: No nausea, vomiting.    Msk: hip pain and pain of rt buttock   Vital signs/Intake and Output:  Visit Vitals    /79    Pulse 100    Temp 99.4 °F (37.4 °C)    Resp 19    Ht 5' 5\" (1.651 m)    Wt 66.5 kg (146 lb 9.7 oz)    SpO2 100%    BMI 24.4 kg/m2     Current Shift:  03/20 0701 - 03/20 1900  In: 360 [P.O.:360]  Out: 1050 [VGNDY:1904]  Last three shifts:  03/18 1901 - 03/20 0700  In: 3772.5 [P.O.:1420; I.V.:2352.5]  Out: 2975 [Urine:2975]    Physical Exam:  General: WD, WN. Alert, cooperative, no acute distress    HEENT: NC, Atraumatic. PERRLA, anicteric sclerae. Lungs: CTA Bilaterally. No Wheezing/Rhonchi/Rales. Heart:  Regular  rhythm,  No murmur, No Rubs, No Gallops  Abdomen: Soft, Non distended, Non tender.  +Bowel sounds,   Extremities: No c/c/e  Psych:   Not anxious or agitated. Neurologic:  No acute neurological deficit. Skin: big bump on rt side of buttock with warm on touch. Mild tenderness. Fluctuation in the middle of bump          Labs: Results:       Chemistry Recent Labs      03/19/17   0605  03/18/17   0340   GLU  99  57*   NA  142  140   K  4.9  5.7*   CL  116*  115*   CO2  16*  14*   BUN  23*  28*   CREA  2.32*  2.35*   CA  7.6*  7.9*   AGAP  10  11   BUCR  10*  12   AP  67  70   TP  5.5*  6.2*   ALB  1.9*  2.1*   GLOB  3.6  4.1*   AGRAT  0.5*  0.5*      CBC w/Diff Recent Labs      03/20/17   1215  03/19/17   0605  03/18/17   0746   WBC  27.9*  20.6*  22.4*   RBC  2.34*  2.21*  2.39*   HGB  6.9*  6.4*  7.0*   HCT  19.7*  18.7*  20.1*   PLT  245  213  217   GRANS  82*  84*  89*   LYMPH  6*  4*  2*   EOS  0  0  3      Cardiac Enzymes No results for input(s): CPK, CKND1, STEFAN in the last 72 hours. No lab exists for component: CKRMB, TROIP   Coagulation Recent Labs      03/19/17   1138   PTP  15.1   INR  1.2   APTT  37.0*       Lipid Panel No results found for: CHOL, CHOLPOCT, CHOLX, CHLST, CHOLV, [id number], HDL, LDL, NLDLCT, DLDL, LDLC, DLDLP, [id number], VLDLC, VLDL, TGL, TGLX, TRIGL, [id number], TRIGP, TGLPOCT, [id number], CHHD, CHHDX   BNP No results for input(s): BNPP in the last 72 hours.    Liver Enzymes Recent Labs      03/19/17   0605   TP  5.5*   ALB  1.9*   AP 67   SGOT  21      Thyroid Studies Lab Results   Component Value Date/Time    TSH 1.08 03/19/2017 06:05 AM        Procedures/imaging: see electronic medical records for all procedures/Xrays and details which were not copied into this note but were reviewed prior to creation of Tiff Vivas MD
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
## 2017-03-20 NOTE — PROGRESS NOTES
2000:  Patient with complaint of slight shortness of breath. Lung sounds with few scattered wheezes. Noted also low grade temp of 100.4. Patient remains with c/o R hip pain, pitting edema to R hip. Dr. Jami Durand paged. 2005:  Discussed above with Dr. Jami Durand. Also noted negative BC x 2 days, initial clear CXR, LA 0.7 today, d-dimer of 4.97, and order for RLE duplex tomorrow. Also reported patient given ICS today and used occasionally per pt report. Discussed R hip pain (chronic) but with new pitting edema today. Per patient fell on R hip PTA. Ordered for repeat CXR, BC, UA, antibiotics, and duo-nebs. 0000:  Called Dr. Jami Durand to review CXR results with pulmonary edema in light of NS @150 and elevated BP of 167/84. Also reviewed creatinine labs. Ordered to discontinue NS, continue to monitor BP. Also discussed patient's request for increase in morphine administration as pain uncontrolled per patient report earlier this shift. Patient currently sleeping in bed. Orders received to increase morphine to PRN Q3-4 hours. 0100:  Patient remains resting comfortably in bed. No s/sx of respiratory distress. Shift summary:  Patient with uneventful remainder of shift after duo-neb given and fluids stopped. Patient left in no acute distress with call light and phone within reach.

## 2017-03-20 NOTE — ROUTINE PROCESS
Bedside and Verbal shift change report given to Stewart Raines RN (oncoming nurse) by Matthieu Sylvester RN (offgoing nurse). Report given with SBAR, Procedure Summary, Intake/Output, MAR and Recent Results.

## 2017-03-20 NOTE — PROCEDURES
[facility]  *** FINAL REPORT ***    Name: Enrique Trevino  MRN: [id number]    Inpatient  : 03 May 1960  HIS Order #: [id number]  [address] [facility] Visit #: [id number]  Date: 20 Mar 2017    TYPE OF TEST: Peripheral Venous Testing    REASON FOR TEST  Pain in limb    Right Leg:-  Deep venous thrombosis:           No  Superficial venous thrombosis:    No  Deep venous insufficiency:        Not examined  Superficial venous insufficiency: Not examined      INTERPRETATION/FINDINGS  Duplex images were obtained using 2-D gray scale, color flow and  spectral doppler analysis. This study suggests no evidence of deep venous thrombosis involving  the right lower extremity at the present time. Right leg :  1. Deep vein(s) visualized include the common femoral, deep femoral,  proximal femoral, mid femoral, distal femoral, popliteal(above knee),  popliteal(fossa), popliteal(below knee), posterior tibial and peroneal   veins. 2. No evidence of deep vein thrombosis in the contralateral left  common femoral vein. 4. No evidence of superficial thrombosis detected. 5. Pulsatile flow detected in multiple vessels of the right lower  extremity deep venous system suggestive of increased central venous  pressure. ADDITIONAL COMMENTS    I have personally reviewed the data relevant to the interpretation of  this  study. TECHNOLOGIST: Domingo Parr. David  Signed: 2017 11:07 AM    PHYSICIAN: Trip Licona.  Jack Steen MD  Signed: 2017 02:56 PM

## 2017-03-20 NOTE — PROGRESS NOTES
Pharmacy Dosing Services: Vancomycin    Consult for Vancomycin Dosing by Pharmacy by Dr. Karl Cano provided for this 55y.o. year old male , for indication of skin and soft tissue infection. Day of Therapy 1    Ht Readings from Last 1 Encounters:   03/16/17 165.1 cm (65\")        Wt Readings from Last 1 Encounters:   03/20/17 66.5 kg (146 lb 9.7 oz)        Other Current Antibiotics Levofloxacin 500 mg IV q24h   Significant Cultures pending   Serum Creatinine Lab Results   Component Value Date/Time    Creatinine 2.32 03/19/2017 06:05 AM      Creatinine Clearance Estimated Creatinine Clearance: 31.3 mL/min (based on Cr of 2.32). BUN Lab Results   Component Value Date/Time    BUN 23 03/19/2017 06:05 AM      WBC Lab Results   Component Value Date/Time    WBC 27.9 03/20/2017 12:15 PM      H/H Lab Results   Component Value Date/Time    HGB 6.9 03/20/2017 12:15 PM      Platelets Lab Results   Component Value Date/Time    PLATELET 681 97/40/0736 12:15 PM      Temp 99.4 °F (37.4 °C)     Start Vancomycin therapy, with loading dose of 1250 mg IV once, ordered for 3/20/17 at 19:00. Follow with maintenance dose of Vancomycin 750 mg IV every 24 hours, starting 3/21/17 at 19:00. Dose calculated to approximate a therapeutic trough of 10 - 15 mcg/mL. Pharmacy to follow daily and will make changes to dose and/or frequency based on clinical status.   Pharmacist Idania Cruz, [facility]

## 2017-03-20 NOTE — PROGRESS NOTES
Chart reviewed and spoke with Pt during IDR's. Pt is planning on returning home after dc and is agreeable to [facility] services. Pt is pending radiology testing once appropriate will have therapy evals. [facility]. Depending on therapy eval will assist with arrangements. Care Management Interventions  PCP Verified by CM: Yes  Mode of Transport at Discharge:  Other (see comment)  Transition of Care Consult (CM Consult): Discharge Planning

## 2017-03-21 ENCOUNTER — ANESTHESIA EVENT (OUTPATIENT)
Dept: SURGERY | Age: 57
DRG: 662 | End: 2017-03-21
Payer: MEDICAID

## 2017-03-21 ENCOUNTER — ANESTHESIA (OUTPATIENT)
Dept: SURGERY | Age: 57
DRG: 662 | End: 2017-03-21
Payer: MEDICAID

## 2017-03-21 PROBLEM — N18.30 CKD (CHRONIC KIDNEY DISEASE) STAGE 3, GFR 30-59 ML/MIN (HCC): Status: ACTIVE | Noted: 2017-03-21

## 2017-03-21 LAB
ALBUMIN SERPL ELPH-MCNC: 2.6 G/DL (ref 2.9–4.4)
ALBUMIN/GLOB SERPL: 0.9 {RATIO} (ref 0.7–1.7)
ALPHA1 GLOB SERPL ELPH-MCNC: 0.3 G/DL (ref 0–0.4)
ALPHA2 GLOB SERPL ELPH-MCNC: 0.6 G/DL (ref 0.4–1)
ANION GAP BLD CALC-SCNC: 11 MMOL/L (ref 3–18)
B-GLOBULIN SERPL ELPH-MCNC: 0.7 G/DL (ref 0.7–1.3)
BASOPHILS # BLD AUTO: 0 K/UL (ref 0–0.06)
BASOPHILS # BLD: 0 % (ref 0–2)
BUN SERPL-MCNC: 26 MG/DL (ref 7–18)
BUN/CREAT SERPL: 10 (ref 12–20)
CALCIUM SERPL-MCNC: 8 MG/DL (ref 8.5–10.1)
CHLORIDE SERPL-SCNC: 109 MMOL/L (ref 100–108)
CO2 SERPL-SCNC: 17 MMOL/L (ref 21–32)
CREAT SERPL-MCNC: 2.5 MG/DL (ref 0.6–1.3)
DIFFERENTIAL METHOD BLD: ABNORMAL
EOSINOPHIL # BLD: 0 K/UL (ref 0–0.4)
EOSINOPHIL NFR BLD: 0 % (ref 0–5)
ERYTHROCYTE [DISTWIDTH] IN BLOOD BY AUTOMATED COUNT: 18.1 % (ref 11.6–14.5)
GAMMA GLOB SERPL ELPH-MCNC: 1.5 G/DL (ref 0.4–1.8)
GLOBULIN SER-MCNC: 3.1 G/DL (ref 2.2–3.9)
GLUCOSE SERPL-MCNC: 103 MG/DL (ref 74–99)
HCT VFR BLD AUTO: 18.1 % (ref 36–48)
HCT VFR BLD AUTO: 20 % (ref 36–48)
HGB BLD-MCNC: 6.3 G/DL (ref 13–16)
HGB BLD-MCNC: 6.9 G/DL (ref 13–16)
IGA SERPL-MCNC: 128 MG/DL (ref 90–386)
IGG SERPL-MCNC: 1467 MG/DL (ref 700–1600)
IGM SERPL-MCNC: 37 MG/DL (ref 20–172)
INTERPRETATION SERPL IEP-IMP: ABNORMAL
LDH SERPL L TO P-CCNC: 196 U/L (ref 81–234)
LYMPHOCYTES # BLD AUTO: 7 % (ref 21–52)
LYMPHOCYTES # BLD: 2.1 K/UL (ref 0.9–3.6)
M PROTEIN SERPL ELPH-MCNC: ABNORMAL G/DL
MCH RBC QN AUTO: 29 PG (ref 24–34)
MCHC RBC AUTO-ENTMCNC: 34.8 G/DL (ref 31–37)
MCV RBC AUTO: 83.4 FL (ref 74–97)
METHYLMALONATE SERPL-SCNC: 231 NMOL/L (ref 0–378)
MONOCYTES # BLD: 0 K/UL (ref 0.05–1.2)
MONOCYTES NFR BLD AUTO: 0 % (ref 3–10)
NEUTS BAND NFR BLD MANUAL: 2 %
NEUTS SEG # BLD: 26.8 K/UL (ref 1.8–8)
NEUTS SEG NFR BLD AUTO: 91 % (ref 40–73)
PLATELET # BLD AUTO: 251 K/UL (ref 135–420)
PMV BLD AUTO: 9.4 FL (ref 9.2–11.8)
POTASSIUM SERPL-SCNC: 5.2 MMOL/L (ref 3.5–5.5)
PROT SERPL-MCNC: 5.7 G/DL (ref 6–8.5)
RBC # BLD AUTO: 2.17 M/UL (ref 4.7–5.5)
RBC MORPH BLD: ABNORMAL
SODIUM SERPL-SCNC: 137 MMOL/L (ref 136–145)
WBC # BLD AUTO: 29.5 K/UL (ref 4.6–13.2)

## 2017-03-21 PROCEDURE — 76210000006 HC OR PH I REC 0.5 TO 1 HR: Performed by: SURGERY

## 2017-03-21 PROCEDURE — 77030032490 HC SLV COMPR SCD KNE COVD -B: Performed by: SURGERY

## 2017-03-21 PROCEDURE — 77030012422 HC DRN WND COVD -A: Performed by: SURGERY

## 2017-03-21 PROCEDURE — 74011250636 HC RX REV CODE- 250/636: Performed by: FAMILY MEDICINE

## 2017-03-21 PROCEDURE — 65660000000 HC RM CCU STEPDOWN

## 2017-03-21 PROCEDURE — 85018 HEMOGLOBIN: CPT | Performed by: ANESTHESIOLOGY

## 2017-03-21 PROCEDURE — 74011250636 HC RX REV CODE- 250/636

## 2017-03-21 PROCEDURE — 74011250636 HC RX REV CODE- 250/636: Performed by: HOSPITALIST

## 2017-03-21 PROCEDURE — 0J9900Z DRAINAGE OF BUTTOCK SUBCUTANEOUS TISSUE AND FASCIA WITH DRAINAGE DEVICE, OPEN APPROACH: ICD-10-PCS | Performed by: RADIOLOGY

## 2017-03-21 PROCEDURE — 76010000138 HC OR TIME 0.5 TO 1 HR: Performed by: SURGERY

## 2017-03-21 PROCEDURE — 87040 BLOOD CULTURE FOR BACTERIA: CPT | Performed by: HOSPITALIST

## 2017-03-21 PROCEDURE — 87070 CULTURE OTHR SPECIMN AEROBIC: CPT | Performed by: SURGERY

## 2017-03-21 PROCEDURE — 74011250636 HC RX REV CODE- 250/636: Performed by: SURGERY

## 2017-03-21 PROCEDURE — 87186 SC STD MICRODIL/AGAR DIL: CPT | Performed by: SURGERY

## 2017-03-21 PROCEDURE — 74011000258 HC RX REV CODE- 258: Performed by: HOSPITALIST

## 2017-03-21 PROCEDURE — 87077 CULTURE AEROBIC IDENTIFY: CPT | Performed by: SURGERY

## 2017-03-21 PROCEDURE — 74011000250 HC RX REV CODE- 250

## 2017-03-21 PROCEDURE — 77030011640 HC PAD GRND REM COVD -A: Performed by: SURGERY

## 2017-03-21 PROCEDURE — 0HB8XZZ EXCISION OF BUTTOCK SKIN, EXTERNAL APPROACH: ICD-10-PCS | Performed by: RADIOLOGY

## 2017-03-21 PROCEDURE — 86850 RBC ANTIBODY SCREEN: CPT | Performed by: HOSPITALIST

## 2017-03-21 PROCEDURE — 87076 CULTURE ANAEROBE IDENT EACH: CPT | Performed by: SURGERY

## 2017-03-21 PROCEDURE — 77030002916 HC SUT ETHLN J&J -A: Performed by: SURGERY

## 2017-03-21 PROCEDURE — 86920 COMPATIBILITY TEST SPIN: CPT | Performed by: HOSPITALIST

## 2017-03-21 PROCEDURE — 87185 SC STD ENZYME DETCJ PER NZM: CPT | Performed by: SURGERY

## 2017-03-21 PROCEDURE — 76060000032 HC ANESTHESIA 0.5 TO 1 HR: Performed by: SURGERY

## 2017-03-21 PROCEDURE — 36415 COLL VENOUS BLD VENIPUNCTURE: CPT | Performed by: HOSPITALIST

## 2017-03-21 PROCEDURE — 83615 LACTATE (LD) (LDH) ENZYME: CPT | Performed by: HOSPITALIST

## 2017-03-21 PROCEDURE — 77030018836 HC SOL IRR NACL ICUM -A: Performed by: SURGERY

## 2017-03-21 PROCEDURE — 87075 CULTR BACTERIA EXCEPT BLOOD: CPT | Performed by: SURGERY

## 2017-03-21 PROCEDURE — 97161 PT EVAL LOW COMPLEX 20 MIN: CPT

## 2017-03-21 PROCEDURE — 80048 BASIC METABOLIC PNL TOTAL CA: CPT | Performed by: HOSPITALIST

## 2017-03-21 PROCEDURE — 74011000250 HC RX REV CODE- 250: Performed by: SURGERY

## 2017-03-21 PROCEDURE — 74011250637 HC RX REV CODE- 250/637: Performed by: FAMILY MEDICINE

## 2017-03-21 PROCEDURE — 74011250637 HC RX REV CODE- 250/637: Performed by: INTERNAL MEDICINE

## 2017-03-21 PROCEDURE — 77030027355 HC HNDPC IRR SURGLAV STRY -B: Performed by: SURGERY

## 2017-03-21 PROCEDURE — 85025 COMPLETE CBC W/AUTO DIFF WBC: CPT | Performed by: HOSPITALIST

## 2017-03-21 RX ORDER — SUCCINYLCHOLINE CHLORIDE 20 MG/ML
INJECTION INTRAMUSCULAR; INTRAVENOUS AS NEEDED
Status: DISCONTINUED | OUTPATIENT
Start: 2017-03-21 | End: 2017-03-21 | Stop reason: HOSPADM

## 2017-03-21 RX ORDER — CEFAZOLIN SODIUM 2 G/50ML
2 SOLUTION INTRAVENOUS
Status: COMPLETED | OUTPATIENT
Start: 2017-03-21 | End: 2017-03-21

## 2017-03-21 RX ORDER — SODIUM CHLORIDE, SODIUM LACTATE, POTASSIUM CHLORIDE, CALCIUM CHLORIDE 600; 310; 30; 20 MG/100ML; MG/100ML; MG/100ML; MG/100ML
125 INJECTION, SOLUTION INTRAVENOUS CONTINUOUS
Status: DISCONTINUED | OUTPATIENT
Start: 2017-03-21 | End: 2017-03-21

## 2017-03-21 RX ORDER — NALOXONE HYDROCHLORIDE 0.4 MG/ML
0.1 INJECTION, SOLUTION INTRAMUSCULAR; INTRAVENOUS; SUBCUTANEOUS AS NEEDED
Status: DISCONTINUED | OUTPATIENT
Start: 2017-03-21 | End: 2017-03-21 | Stop reason: HOSPADM

## 2017-03-21 RX ORDER — INSULIN LISPRO 100 [IU]/ML
INJECTION, SOLUTION INTRAVENOUS; SUBCUTANEOUS ONCE
Status: DISCONTINUED | OUTPATIENT
Start: 2017-03-21 | End: 2017-03-21 | Stop reason: HOSPADM

## 2017-03-21 RX ORDER — SODIUM CHLORIDE 0.9 % (FLUSH) 0.9 %
5-10 SYRINGE (ML) INJECTION AS NEEDED
Status: DISCONTINUED | OUTPATIENT
Start: 2017-03-21 | End: 2017-03-21 | Stop reason: HOSPADM

## 2017-03-21 RX ORDER — GLYCOPYRROLATE 0.2 MG/ML
INJECTION INTRAMUSCULAR; INTRAVENOUS AS NEEDED
Status: DISCONTINUED | OUTPATIENT
Start: 2017-03-21 | End: 2017-03-21 | Stop reason: HOSPADM

## 2017-03-21 RX ORDER — SODIUM CHLORIDE, SODIUM LACTATE, POTASSIUM CHLORIDE, CALCIUM CHLORIDE 600; 310; 30; 20 MG/100ML; MG/100ML; MG/100ML; MG/100ML
1000 INJECTION, SOLUTION INTRAVENOUS CONTINUOUS
Status: DISCONTINUED | OUTPATIENT
Start: 2017-03-21 | End: 2017-03-21 | Stop reason: HOSPADM

## 2017-03-21 RX ORDER — KETAMINE HYDROCHLORIDE 10 MG/ML
INJECTION, SOLUTION INTRAMUSCULAR; INTRAVENOUS AS NEEDED
Status: DISCONTINUED | OUTPATIENT
Start: 2017-03-21 | End: 2017-03-21 | Stop reason: HOSPADM

## 2017-03-21 RX ORDER — PROCHLORPERAZINE MALEATE 10 MG
10 TABLET ORAL 4 TIMES DAILY
Status: DISCONTINUED | OUTPATIENT
Start: 2017-03-21 | End: 2017-03-29 | Stop reason: HOSPADM

## 2017-03-21 RX ORDER — ONDANSETRON 2 MG/ML
INJECTION INTRAMUSCULAR; INTRAVENOUS AS NEEDED
Status: DISCONTINUED | OUTPATIENT
Start: 2017-03-21 | End: 2017-03-21 | Stop reason: HOSPADM

## 2017-03-21 RX ORDER — ROCURONIUM BROMIDE 10 MG/ML
INJECTION, SOLUTION INTRAVENOUS AS NEEDED
Status: DISCONTINUED | OUTPATIENT
Start: 2017-03-21 | End: 2017-03-21 | Stop reason: HOSPADM

## 2017-03-21 RX ORDER — FENTANYL CITRATE 50 UG/ML
INJECTION, SOLUTION INTRAMUSCULAR; INTRAVENOUS AS NEEDED
Status: DISCONTINUED | OUTPATIENT
Start: 2017-03-21 | End: 2017-03-21 | Stop reason: HOSPADM

## 2017-03-21 RX ORDER — DEXTROSE 50 % IN WATER (D50W) INTRAVENOUS SYRINGE
25-50 AS NEEDED
Status: DISCONTINUED | OUTPATIENT
Start: 2017-03-21 | End: 2017-03-21 | Stop reason: HOSPADM

## 2017-03-21 RX ORDER — ONDANSETRON 4 MG/1
8 TABLET, FILM COATED ORAL 2 TIMES DAILY
Status: DISCONTINUED | OUTPATIENT
Start: 2017-03-21 | End: 2017-03-29 | Stop reason: HOSPADM

## 2017-03-21 RX ORDER — SODIUM CHLORIDE, SODIUM LACTATE, POTASSIUM CHLORIDE, CALCIUM CHLORIDE 600; 310; 30; 20 MG/100ML; MG/100ML; MG/100ML; MG/100ML
INJECTION, SOLUTION INTRAVENOUS
Status: DISCONTINUED | OUTPATIENT
Start: 2017-03-21 | End: 2017-03-21 | Stop reason: HOSPADM

## 2017-03-21 RX ORDER — LIDOCAINE HYDROCHLORIDE 20 MG/ML
INJECTION, SOLUTION EPIDURAL; INFILTRATION; INTRACAUDAL; PERINEURAL AS NEEDED
Status: DISCONTINUED | OUTPATIENT
Start: 2017-03-21 | End: 2017-03-21 | Stop reason: HOSPADM

## 2017-03-21 RX ORDER — SODIUM CHLORIDE 9 MG/ML
250 INJECTION, SOLUTION INTRAVENOUS AS NEEDED
Status: DISCONTINUED | OUTPATIENT
Start: 2017-03-21 | End: 2017-03-29 | Stop reason: HOSPADM

## 2017-03-21 RX ORDER — HYDROMORPHONE HYDROCHLORIDE 2 MG/ML
0.5 INJECTION, SOLUTION INTRAMUSCULAR; INTRAVENOUS; SUBCUTANEOUS
Status: DISCONTINUED | OUTPATIENT
Start: 2017-03-21 | End: 2017-03-21 | Stop reason: HOSPADM

## 2017-03-21 RX ORDER — MIDAZOLAM HYDROCHLORIDE 1 MG/ML
INJECTION, SOLUTION INTRAMUSCULAR; INTRAVENOUS AS NEEDED
Status: DISCONTINUED | OUTPATIENT
Start: 2017-03-21 | End: 2017-03-21 | Stop reason: HOSPADM

## 2017-03-21 RX ORDER — FLUMAZENIL 0.1 MG/ML
0.2 INJECTION INTRAVENOUS
Status: DISCONTINUED | OUTPATIENT
Start: 2017-03-21 | End: 2017-03-21 | Stop reason: HOSPADM

## 2017-03-21 RX ORDER — MAGNESIUM SULFATE 100 %
4 CRYSTALS MISCELLANEOUS AS NEEDED
Status: DISCONTINUED | OUTPATIENT
Start: 2017-03-21 | End: 2017-03-21 | Stop reason: HOSPADM

## 2017-03-21 RX ORDER — PROPOFOL 10 MG/ML
INJECTION, EMULSION INTRAVENOUS AS NEEDED
Status: DISCONTINUED | OUTPATIENT
Start: 2017-03-21 | End: 2017-03-21 | Stop reason: HOSPADM

## 2017-03-21 RX ADMIN — CARVEDILOL 6.25 MG: 3.12 TABLET, FILM COATED ORAL at 08:34

## 2017-03-21 RX ADMIN — Medication 2 MG: at 01:43

## 2017-03-21 RX ADMIN — AMLODIPINE BESYLATE 10 MG: 5 TABLET ORAL at 08:34

## 2017-03-21 RX ADMIN — PIPERACILLIN SODIUM,TAZOBACTAM SODIUM 2.25 G: 2; .25 INJECTION, POWDER, FOR SOLUTION INTRAVENOUS at 20:57

## 2017-03-21 RX ADMIN — Medication 10 ML: at 08:36

## 2017-03-21 RX ADMIN — CEFAZOLIN SODIUM 2 G: 2 SOLUTION INTRAVENOUS at 18:11

## 2017-03-21 RX ADMIN — PROPOFOL 170 MG: 10 INJECTION, EMULSION INTRAVENOUS at 18:07

## 2017-03-21 RX ADMIN — FENTANYL CITRATE 50 MCG: 50 INJECTION, SOLUTION INTRAMUSCULAR; INTRAVENOUS at 18:21

## 2017-03-21 RX ADMIN — FOLIC ACID 1 MG: 1 TABLET ORAL at 08:34

## 2017-03-21 RX ADMIN — ONDANSETRON HYDROCHLORIDE 8 MG: 4 TABLET, FILM COATED ORAL at 20:54

## 2017-03-21 RX ADMIN — SODIUM CHLORIDE, SODIUM LACTATE, POTASSIUM CHLORIDE, AND CALCIUM CHLORIDE 125 ML/HR: 600; 310; 30; 20 INJECTION, SOLUTION INTRAVENOUS at 17:50

## 2017-03-21 RX ADMIN — GLYCOPYRROLATE 0.2 MG: 0.2 INJECTION INTRAMUSCULAR; INTRAVENOUS at 17:57

## 2017-03-21 RX ADMIN — CARVEDILOL 6.25 MG: 3.12 TABLET, FILM COATED ORAL at 20:54

## 2017-03-21 RX ADMIN — MEGESTROL ACETATE 200 MG: 40 SUSPENSION ORAL at 09:39

## 2017-03-21 RX ADMIN — Medication 2 MG: at 23:55

## 2017-03-21 RX ADMIN — SUCCINYLCHOLINE CHLORIDE 120 MG: 20 INJECTION INTRAMUSCULAR; INTRAVENOUS at 18:07

## 2017-03-21 RX ADMIN — SODIUM CHLORIDE, SODIUM LACTATE, POTASSIUM CHLORIDE, CALCIUM CHLORIDE: 600; 310; 30; 20 INJECTION, SOLUTION INTRAVENOUS at 17:56

## 2017-03-21 RX ADMIN — Medication 2 MG: at 20:59

## 2017-03-21 RX ADMIN — ONDANSETRON 4 MG: 2 INJECTION INTRAMUSCULAR; INTRAVENOUS at 17:57

## 2017-03-21 RX ADMIN — Medication 2 MG: at 08:34

## 2017-03-21 RX ADMIN — FENTANYL CITRATE 50 MCG: 50 INJECTION, SOLUTION INTRAMUSCULAR; INTRAVENOUS at 18:00

## 2017-03-21 RX ADMIN — KETAMINE HYDROCHLORIDE 10 MG: 10 INJECTION, SOLUTION INTRAMUSCULAR; INTRAVENOUS at 18:15

## 2017-03-21 RX ADMIN — LIDOCAINE HYDROCHLORIDE 60 MG: 20 INJECTION, SOLUTION EPIDURAL; INFILTRATION; INTRACAUDAL; PERINEURAL at 18:07

## 2017-03-21 RX ADMIN — PROCHLORPERAZINE MALEATE 10 MG: 10 TABLET, FILM COATED ORAL at 20:54

## 2017-03-21 RX ADMIN — ROCURONIUM BROMIDE 10 MG: 10 INJECTION, SOLUTION INTRAVENOUS at 18:07

## 2017-03-21 RX ADMIN — KETAMINE HYDROCHLORIDE 10 MG: 10 INJECTION, SOLUTION INTRAMUSCULAR; INTRAVENOUS at 18:24

## 2017-03-21 RX ADMIN — PIPERACILLIN SODIUM,TAZOBACTAM SODIUM 2.25 G: 2; .25 INJECTION, POWDER, FOR SOLUTION INTRAVENOUS at 09:39

## 2017-03-21 RX ADMIN — KETOROLAC TROMETHAMINE 30 MG: 30 INJECTION, SOLUTION INTRAMUSCULAR at 15:53

## 2017-03-21 RX ADMIN — SODIUM CHLORIDE 750 MG: 900 INJECTION, SOLUTION INTRAVENOUS at 20:55

## 2017-03-21 RX ADMIN — Medication 100 MG: at 08:34

## 2017-03-21 RX ADMIN — Medication 10 ML: at 15:17

## 2017-03-21 RX ADMIN — VITAM B12 100 MCG: 100 TAB at 08:34

## 2017-03-21 RX ADMIN — Medication 10 ML: at 21:13

## 2017-03-21 RX ADMIN — FENTANYL CITRATE 100 MCG: 50 INJECTION, SOLUTION INTRAMUSCULAR; INTRAVENOUS at 18:03

## 2017-03-21 RX ADMIN — PIPERACILLIN SODIUM,TAZOBACTAM SODIUM 2.25 G: 2; .25 INJECTION, POWDER, FOR SOLUTION INTRAVENOUS at 15:14

## 2017-03-21 RX ADMIN — FENTANYL CITRATE 50 MCG: 50 INJECTION, SOLUTION INTRAMUSCULAR; INTRAVENOUS at 18:31

## 2017-03-21 RX ADMIN — Medication 2 MG: at 12:39

## 2017-03-21 RX ADMIN — MIDAZOLAM HYDROCHLORIDE 2 MG: 1 INJECTION, SOLUTION INTRAMUSCULAR; INTRAVENOUS at 17:57

## 2017-03-21 RX ADMIN — ACETAMINOPHEN 650 MG: 325 TABLET ORAL at 04:23
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
## 2017-03-21 NOTE — PERIOP NOTES
TRANSFER - IN REPORT:    Verbal report received from Gen Chruchill CRNA and [facility], RN (name) on Clair Mariee  being received from OR (unit) for routine progression of care      Report consisted of patients Situation, Background, Assessment and   Recommendations(SBAR). Information from the following report(s) SBAR, OR Summary, Procedure Summary, Intake/Output and MAR was reviewed with the receiving nurse. Opportunity for questions and clarification was provided. Assessment completed upon patients arrival to unit and care assumed.

## 2017-03-21 NOTE — PROGRESS NOTES
3114 Tino Hurley care of pt from Catarina Moss RN. Pt resting quietly, no signs of distress, call bell within reach. 0920 Assessment completed, pt has pain in his right hip that he states chronically hurts, but hurts \"worse than normal\" lump palpable on right buttocks, will let MD know. Instructed to use call bell, given PRN Morphine Placed on 2L NC because pt was tachypnic states he's not sure if \"nebulizer helped\"     1138 IDR  made aware of rt hip, orders to follow. No PT/OT for now until results of XR and US are in. Dr Debi Rose made aware of H&H results, getting another CBC no orders given     1230 PRN Morphine given for pain in right hip    1601 PRN Morphine given for pain in right hip     1901 PRN Morphine given for pain in right hip. MD paged because pt temperature 101.7 Pt also received PRN Zofran for nausea after eating some of his dinner, states he wants to get nausea medication and wait to eat.    1906 Spoke with , updated on condition orders for Tylenol 650 mg q6hrs prn fever given. Shift Summary Shift uneventful.  Pt able to sit at side of bed for meals, however did not have much of an appetite, given PRN Morphine for pain in right hip, MD aware and orders were received Pt able to use call bell when in need of assistance

## 2017-03-21 NOTE — PROGRESS NOTES
Assumed care; Pt resting in bed; no distress noted; bed in low position; Side rails up x 3; Call light and personal belonging within reach. Will continue to monitor. 0830: Pt complains of pain, see mar for intervention. Complex assessment. 0930: Blood culture colleted. Antibiotics started. 1230: Pt assisted to chair; encouraged to use Incentive Spirometry. 1430: Pt assisted back to bed. No distress noted.

## 2017-03-21 NOTE — ROUTINE PROCESS
Bedside and Verbal shift change report given to Meliza GAVIRIA RN (oncoming nurse) by Artie Redd RN (offgoing nurse). Report included the following information SBAR, Kardex and MAR. Patient had no acute events overnight. Currently resting in NAD. Last medicated for pain at 0423. No express needs reported at this time.

## 2017-03-21 NOTE — ANESTHESIA POSTPROCEDURE EVALUATION
Post-Anesthesia Evaluation & Assessment    Visit Vitals    /66    Pulse 89    Temp 36.6 °C (97.9 °F)    Resp 20    Ht 5' 5\" (1.651 m)    Wt 68 kg (150 lb)    SpO2 95%    BMI 24.96 kg/m2       No untreated/active PONV    Post-operative hydration adequate. Adequate post-operative analgesia per PACU discharge criteria    Mental status & level of consciousness: alert and oriented x 3    Respiratory status: patent unassisted airway     No apparent anesthetic complications requiring additional post-anesthetic care    Patient has met all discharge requirements.             Hortensia Baeza MD

## 2017-03-21 NOTE — PROGRESS NOTES
Hospitalist Progress Note-critical care note     Patient: Kamlesh Shin MRN: [id number]  CSN: [id number]    YOB: 1960  Age: 55 y.o. 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 EDGAR; improving   and stable   3 Sickle cell anemia with crisis. LDH wnl, resolved, transfusion for surgery   4. narcotic overdose  5 alcohol abuse; on CIWA protocol. Doing well, stopped ciwa protoc   6. soft tissue abscess rt buttock  Fever and leukocytosis, bx obtained and repeated  Zosyn and  vanc and need I &d per  Dr. Liliane Farah today    7 hip pain   No fracture   8 ,bilateral effusion   will have echo , no  chf reported   9 sepsis   Subjective: feel fine   Nurse: no acute issue     Case discussed with Dr. Liliane Farah and Dr. Vanesa Cardona . His hb 7.7 was his baseline, two units  prbc ordered for procedure. Plan discussed with RN also. Will have blood ready asap. Review of systems:    General: No fevers or chills. Cardiovascular: No chest pain or pressure. No palpitations. Pulmonary: No shortness of breath.    Gastrointestinal: No nausea, vomiting. Vital signs/Intake and Output:  Visit Vitals    /56    Pulse 96    Temp 98.9 °F (37.2 °C)    Resp 18    Ht 5' 5\" (1.651 m)    Wt 68 kg (150 lb)    SpO2 97%    BMI 24.96 kg/m2     Current Shift:  03/21 0701 - 03/21 1900  In: -   Out: 250 [Urine:250]  Last three shifts:  03/19 1901 - 03/21 0700  In: 1492.5 [P.O.:540; I.V.:952.5]  Out: 1925 [Urine:1925]    Physical Exam:  General: WD, WN. Alert, cooperative, no acute distress    HEENT: NC, Atraumatic. PERRLA, anicteric sclerae. Lungs: CTA Bilaterally. No Wheezing/Rhonchi/Rales. Heart:  Regular  rhythm,  No murmur, No Rubs, No Gallops  Abdomen: Soft, Non distended, Non tender.  +Bowel sounds,   Extremities: No c/c/e  Psych:   Not anxious or agitated. Neurologic:  No acute neurological deficit. Skin: abscess on  rt side of buttock with warm and tenderness on touch. Labs: Results:       Chemistry Recent Labs      03/21/17   0545  03/19/17   0605   GLU  103*  99   NA  137  142   K  5.2  4.9   CL  109*  116*   CO2  17*  16*   BUN  26*  23*   CREA  2.50*  2.32*   CA  8.0*  7.6*   AGAP  11  10   BUCR  10*  10*   AP   --   67   TP   --   5.5*   ALB   --   1.9*   GLOB   --   3.6   AGRAT   --   0.5*      CBC w/Diff Recent Labs      03/21/17   0545  03/20/17   1215  03/19/17   0605   WBC  29.5*  27.9*  20.6*   RBC  2.17*  2.34*  2.21*   HGB  6.3*  6.9*  6.4*   HCT  18.1*  19.7*  18.7*   PLT  251  245  213   GRANS  91*  82*  84*   LYMPH  7*  6*  4*   EOS  0  0  0      Cardiac Enzymes No results for input(s): CPK, CKND1, STEFAN in the last 72 hours. No lab exists for component: CKRMB, TROIP   Coagulation Recent Labs      03/19/17   1138   PTP  15.1   INR  1.2   APTT  37.0*       Lipid Panel No results found for: CHOL, CHOLPOCT, CHOLX, CHLST, CHOLV, [id number], HDL, LDL, NLDLCT, DLDL, LDLC, DLDLP, 291450, VLDLC, VLDL, TGL, TGLX, TRIGL, [id number], TRIGP, TGLPOCT, [id number], CHHD, CHHDX   BNP No results for input(s): BNPP in the last 72 hours. Liver Enzymes Recent Labs      03/19/17   0605   TP  5.5*   ALB  1.9*   AP  67   SGOT  21      Thyroid Studies Lab Results   Component Value Date/Time    TSH 1.08 03/19/2017 06:05 AM        Procedures/imaging: see electronic medical records for all procedures/Xrays and details which were not copied into this note but were reviewed prior to creation of Bee Mckeon MD

## 2017-03-21 NOTE — ANESTHESIA PREPROCEDURE EVALUATION
Anesthetic History   No history of anesthetic complications            Review of Systems / Medical History  Patient summary reviewed, nursing notes reviewed and pertinent labs reviewed    Pulmonary  Within defined limits              Comments: Effusions but improved oxygenation. Now off o2   Neuro/Psych   Within defined limits           Cardiovascular    Hypertension: well controlled              Exercise tolerance: >4 METS  Comments: ivda  Sickle cell anemia in crisis  septic     GI/Hepatic/Renal         Renal disease: ARF  Liver disease     Endo/Other        Anemia     Other Findings            Physical Exam    Airway  Mallampati: II  TM Distance: 4 - 6 cm  Neck ROM: normal range of motion   Mouth opening: Diminished (comment)     Cardiovascular    Rhythm: regular  Rate: normal         Dental      Comments: Broken teeth   Pulmonary  Breath sounds clear to auscultation               Abdominal  GI exam deferred       Other Findings            Anesthetic Plan    ASA: 4, emergent  Anesthesia type: general          Induction: Intravenous  Anesthetic plan and risks discussed with: Patient      This is an urgent case. Npo only 5 hours. Off cocaine for 4 days. Hemoglobin usually around 7 so he is almost normal.   He is optimized as much as possible.

## 2017-03-21 NOTE — PERIOP NOTES
Transfer of care performed at the bedside with [facility],  RN. Opportunity for questions and clarification was provided. Blood pressure 138/67, pulse 88, temperature 98.8 °F (37.1 °C), resp. rate 16, height 5' 5\" (1.651 m), weight 68 kg (150 lb), SpO2 100 %.

## 2017-03-21 NOTE — PERIOP NOTES
TRANSFER - OUT REPORT:    Verbal report given to 3 Han RN(name) on [employer]  being transferred to [facility] (unit) for routine progression of care       Report consisted of patients Situation, Background, Assessment and   Recommendations(SBAR). Information from the following report(s) SBAR, OR Summary, Procedure Summary, Intake/Output and MAR was reviewed with the receiving nurse. Lines:   Peripheral IV 03/19/17 Left Antecubital (Active)   Site Assessment Clean, dry, & intact 3/21/2017  6:38 PM   Phlebitis Assessment 0 3/21/2017  6:38 PM   Infiltration Assessment 0 3/21/2017  6:38 PM   Dressing Status Clean, dry, & intact 3/21/2017  6:38 PM   Dressing Type Transparent;Tape 3/21/2017  6:38 PM   Hub Color/Line Status Blue;Capped 3/21/2017  6:38 PM   Action Taken Open ports on tubing capped 3/20/2017  9:20 AM   Alcohol Cap Used Yes 3/21/2017 12:39 PM       Peripheral IV 03/21/17 Right Hand (Active)   Site Assessment Clean, dry, & intact 3/21/2017  6:38 PM   Phlebitis Assessment 0 3/21/2017  6:38 PM   Infiltration Assessment 0 3/21/2017  6:38 PM   Dressing Status Clean, dry, & intact 3/21/2017  6:38 PM   Dressing Type Transparent;Tape 3/21/2017  6:38 PM   Hub Color/Line Status Pink; Infusing 3/21/2017  6:38 PM        Opportunity for questions and clarification was provided.       Patient transported with:   O2 @ 2 liters  Registered Nurse  [facility]

## 2017-03-21 NOTE — PROGRESS NOTES
Bedside and Verbal shift change report given to Fannie Mak RN (oncoming nurse) by Zabrina aMres RN (offgoing nurse). Report included the following information SBAR, Kardex, Procedure Summary, Intake/Output, MAR, Accordion, Recent Results and Med Rec Status.

## 2017-03-21 NOTE — PROGRESS NOTES
1915:  Assumed care of patient. Patient resting in bed. Call bell and phone left in reach. 2000:  Patient c/o nausea. PRN zofran given at 1913. Patient given gingerale and crackers. Patient also with very few faint scattered wheezes. Patient declined nebulizer. 2130:  BP and temp reassessed as were elevated at 1900. Both BP and temperature down. Visit Vitals    /80    Pulse (!) 109    Temp (!) 100.6 °F (38.1 °C)    Resp 19    Ht 5' 5\" (1.651 m)    Wt 66.5 kg (146 lb 9.7 oz)    SpO2 100%    BMI 24.4 kg/m2     Shift summary:  Patient with overall uneventful shift. Patient left in no acute distress with call light and phone within reach.
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
## 2017-03-21 NOTE — PROGRESS NOTES
Reason for Renal Dosing:  Zosyn    Patient clinical status and labs ordered/reviewed. Pt Weight Weight: 68 kg (150 lb)   Serum Creatinine Lab Results   Component Value Date/Time    Creatinine 2.50 03/21/2017 05:45 AM       Creatinine Clearance Estimated Creatinine Clearance: 29 mL/min (based on Cr of 2.5). BUN Lab Results   Component Value Date/Time    BUN 26 03/21/2017 05:45 AM       WBC Lab Results   Component Value Date/Time    WBC 29.5 03/21/2017 05:45 AM      Temperature Temp: 99.1 °F (37.3 °C)   HR Pulse (Heart Rate): 92     BP BP: 155/76           Drug:  Zosyn 3.375 gm IV q8      Drug/dose: Dose changed to Zosyn 2.25 gm IV q6 per facility Renal Dosing Policy for indication. Continue to monitor. Juana Soliz, Pharm. D.   Clinical Pharmacist  [phone]
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
## 2017-03-22 ENCOUNTER — APPOINTMENT (OUTPATIENT)
Dept: CT IMAGING | Age: 57
DRG: 662 | End: 2017-03-22
Attending: ORTHOPAEDIC SURGERY
Payer: MEDICAID

## 2017-03-22 LAB
ANION GAP BLD CALC-SCNC: 11 MMOL/L (ref 3–18)
BASOPHILS # BLD AUTO: 0.3 K/UL (ref 0–0.1)
BASOPHILS # BLD: 1 % (ref 0–3)
BUN SERPL-MCNC: 29 MG/DL (ref 7–18)
BUN/CREAT SERPL: 12 (ref 12–20)
CALCIUM SERPL-MCNC: 7.8 MG/DL (ref 8.5–10.1)
CHLORIDE SERPL-SCNC: 107 MMOL/L (ref 100–108)
CO2 SERPL-SCNC: 19 MMOL/L (ref 21–32)
CREAT SERPL-MCNC: 2.49 MG/DL (ref 0.6–1.3)
DIFFERENTIAL METHOD BLD: ABNORMAL
EOSINOPHIL # BLD: 1.2 K/UL (ref 0–0.4)
EOSINOPHIL NFR BLD: 4 % (ref 0–5)
ERYTHROCYTE [DISTWIDTH] IN BLOOD BY AUTOMATED COUNT: 17.3 % (ref 11.6–14.5)
GLUCOSE SERPL-MCNC: 102 MG/DL (ref 74–99)
HCT VFR BLD AUTO: 24.6 % (ref 36–48)
HGB BLD-MCNC: 8.6 G/DL (ref 13–16)
LDH SERPL L TO P-CCNC: 206 U/L (ref 81–234)
LYMPHOCYTES # BLD AUTO: 12 % (ref 20–51)
LYMPHOCYTES # BLD: 3.6 K/UL (ref 0.8–3.5)
MCH RBC QN AUTO: 29.5 PG (ref 24–34)
MCHC RBC AUTO-ENTMCNC: 35 G/DL (ref 31–37)
MCV RBC AUTO: 84.2 FL (ref 74–97)
MONOCYTES # BLD: 0.6 K/UL (ref 0–1)
MONOCYTES NFR BLD AUTO: 2 % (ref 2–9)
NEUTS BAND NFR BLD MANUAL: 2 % (ref 0–5)
NEUTS SEG # BLD: 23.4 K/UL (ref 1.8–8)
NEUTS SEG NFR BLD AUTO: 79 % (ref 42–75)
PLATELET # BLD AUTO: 260 K/UL (ref 135–420)
PMV BLD AUTO: 10.2 FL (ref 9.2–11.8)
POTASSIUM SERPL-SCNC: 4.8 MMOL/L (ref 3.5–5.5)
RBC # BLD AUTO: 2.92 M/UL (ref 4.7–5.5)
RBC MORPH BLD: ABNORMAL
RBC MORPH BLD: ABNORMAL
SODIUM SERPL-SCNC: 137 MMOL/L (ref 136–145)
WBC # BLD AUTO: 29.6 K/UL (ref 4.6–13.2)
WBC MORPH BLD: ABNORMAL

## 2017-03-22 PROCEDURE — 74011250636 HC RX REV CODE- 250/636: Performed by: HOSPITALIST

## 2017-03-22 PROCEDURE — 74011250637 HC RX REV CODE- 250/637: Performed by: FAMILY MEDICINE

## 2017-03-22 PROCEDURE — 72192 CT PELVIS W/O DYE: CPT

## 2017-03-22 PROCEDURE — 36415 COLL VENOUS BLD VENIPUNCTURE: CPT | Performed by: HOSPITALIST

## 2017-03-22 PROCEDURE — 83615 LACTATE (LD) (LDH) ENZYME: CPT | Performed by: HOSPITALIST

## 2017-03-22 PROCEDURE — 74011250637 HC RX REV CODE- 250/637: Performed by: INTERNAL MEDICINE

## 2017-03-22 PROCEDURE — 77010033678 HC OXYGEN DAILY

## 2017-03-22 PROCEDURE — 74011250636 HC RX REV CODE- 250/636: Performed by: FAMILY MEDICINE

## 2017-03-22 PROCEDURE — 97116 GAIT TRAINING THERAPY: CPT

## 2017-03-22 PROCEDURE — 85025 COMPLETE CBC W/AUTO DIFF WBC: CPT | Performed by: HOSPITALIST

## 2017-03-22 PROCEDURE — 74011000258 HC RX REV CODE- 258: Performed by: HOSPITALIST

## 2017-03-22 PROCEDURE — 97165 OT EVAL LOW COMPLEX 30 MIN: CPT

## 2017-03-22 PROCEDURE — 36430 TRANSFUSION BLD/BLD COMPNT: CPT

## 2017-03-22 PROCEDURE — 80048 BASIC METABOLIC PNL TOTAL CA: CPT | Performed by: HOSPITALIST

## 2017-03-22 PROCEDURE — P9016 RBC LEUKOCYTES REDUCED: HCPCS | Performed by: HOSPITALIST

## 2017-03-22 PROCEDURE — 65660000000 HC RM CCU STEPDOWN

## 2017-03-22 RX ADMIN — PIPERACILLIN SODIUM,TAZOBACTAM SODIUM 2.25 G: 2; .25 INJECTION, POWDER, FOR SOLUTION INTRAVENOUS at 15:15

## 2017-03-22 RX ADMIN — Medication 100 MG: at 08:50

## 2017-03-22 RX ADMIN — Medication 2 MG: at 08:06

## 2017-03-22 RX ADMIN — Medication 10 ML: at 22:40

## 2017-03-22 RX ADMIN — Medication 2 MG: at 19:33

## 2017-03-22 RX ADMIN — Medication 2 MG: at 02:52

## 2017-03-22 RX ADMIN — VITAM B12 100 MCG: 100 TAB at 08:50

## 2017-03-22 RX ADMIN — ONDANSETRON HYDROCHLORIDE 8 MG: 4 TABLET, FILM COATED ORAL at 08:49

## 2017-03-22 RX ADMIN — PIPERACILLIN SODIUM,TAZOBACTAM SODIUM 2.25 G: 2; .25 INJECTION, POWDER, FOR SOLUTION INTRAVENOUS at 03:00

## 2017-03-22 RX ADMIN — FOLIC ACID 1 MG: 1 TABLET ORAL at 08:49

## 2017-03-22 RX ADMIN — CARVEDILOL 6.25 MG: 3.12 TABLET, FILM COATED ORAL at 08:50

## 2017-03-22 RX ADMIN — PROCHLORPERAZINE MALEATE 10 MG: 10 TABLET, FILM COATED ORAL at 17:24

## 2017-03-22 RX ADMIN — Medication 10 ML: at 05:29

## 2017-03-22 RX ADMIN — AMLODIPINE BESYLATE 10 MG: 5 TABLET ORAL at 08:50

## 2017-03-22 RX ADMIN — ONDANSETRON HYDROCHLORIDE 8 MG: 4 TABLET, FILM COATED ORAL at 22:39

## 2017-03-22 RX ADMIN — PIPERACILLIN SODIUM,TAZOBACTAM SODIUM 2.25 G: 2; .25 INJECTION, POWDER, FOR SOLUTION INTRAVENOUS at 11:00

## 2017-03-22 RX ADMIN — PROCHLORPERAZINE MALEATE 10 MG: 10 TABLET, FILM COATED ORAL at 15:15

## 2017-03-22 RX ADMIN — PROCHLORPERAZINE MALEATE 10 MG: 10 TABLET, FILM COATED ORAL at 22:38

## 2017-03-22 RX ADMIN — CARVEDILOL 6.25 MG: 3.12 TABLET, FILM COATED ORAL at 17:24

## 2017-03-22 RX ADMIN — Medication 2 MG: at 11:01

## 2017-03-22 RX ADMIN — PROCHLORPERAZINE MALEATE 10 MG: 10 TABLET, FILM COATED ORAL at 08:50

## 2017-03-22 RX ADMIN — Medication 2 MG: at 22:38

## 2017-03-22 RX ADMIN — Medication 10 ML: at 15:15

## 2017-03-22 RX ADMIN — MEGESTROL ACETATE 200 MG: 40 SUSPENSION ORAL at 15:08

## 2017-03-22 RX ADMIN — SODIUM CHLORIDE 750 MG: 900 INJECTION, SOLUTION INTRAVENOUS at 22:40

## 2017-03-22 RX ADMIN — PIPERACILLIN SODIUM,TAZOBACTAM SODIUM 2.25 G: 2; .25 INJECTION, POWDER, FOR SOLUTION INTRAVENOUS at 22:40

## 2017-03-22 RX ADMIN — Medication 2 MG: at 15:15

## 2017-03-22 NOTE — PROGRESS NOTES
Progress Note    Patient: Melinda Peterson MRN: [id number]  CSN: [id number]    YOB: 1960  Age: 55 y.o. Sex: male    DOA: 3/16/2017 LOS:  LOS: 5 days                    Subjective:     Right buttock/hip pain is better after I and D, pt states he's been having R hip for months, hurts to climb stairs    Objective:      Visit Vitals    /68 (BP 1 Location: Left arm, BP Patient Position: At rest)    Pulse 86    Temp 98.7 °F (37.1 °C)    Resp 18    Ht 5' 5\" (1.651 m)    Wt 68.5 kg (151 lb 0.2 oz)    SpO2 99%    BMI 25.13 kg/m2       Physical Exam:  Dressing is dry    Intake and Output:  Current Shift:     Last three shifts:  03/20 1901 - 03/22 0700  In: 1571.2 [I.V.:1250]  Out: 1275 [Urine:1275]    Labs: Results:       Chemistry Recent Labs      03/21/17   0545   GLU  103*   NA  137   K  5.2   CL  109*   CO2  17*   BUN  26*   CREA  2.50*   CA  8.0*   AGAP  11   BUCR  10*      CBC w/Diff Recent Labs      03/21/17   1550  03/21/17   0545  03/20/17   1215   WBC   --   29.5*  27.9*   RBC   --   2.17*  2.34*   HGB  6.9*  6.3*  6.9*   HCT  20.0*  18.1*  19.7*   PLT   --   251  245   GRANS   --   91*  82*   LYMPH   --   7*  6*   EOS   --   0  0      Cardiac Enzymes No results for input(s): CPK, CKND1, STEFAN in the last 72 hours. No lab exists for component: CKRMB, TROIP   Coagulation Recent Labs      03/19/17   1138   PTP  15.1   INR  1.2   APTT  37.0*       Lipid Panel No results found for: CHOL, CHOLPOCT, CHOLX, CHLST, CHOLV, [id number], HDL, LDL, NLDLCT, DLDL, LDLC, DLDLP, [id number], VLDLC, VLDL, TGL, TGLX, TRIGL, [id number], TRIGP, TGLPOCT, [id number], CHHD, CHHDX   BNP No results for input(s): BNPP in the last 72 hours. Liver Enzymes No results for input(s): TP, ALB, TBIL, AP, SGOT, GPT in the last 72 hours.     No lab exists for component: DBIL   Thyroid Studies Lab Results   Component Value Date/Time    TSH 1.08 03/19/2017 06:05 AM            Medications Reviewed      Assessment/Plan     Principal Problem:    Acute hyperkalemia (3/16/2017)    Active Problems:    Sickle cell anemia (HCC) (11/4/2014)      Sickle cell crisis ([facility] [address] (11/20/2016)      EDGAR (acute kidney injury) ([facility] (11/20/2016)      Narcotic overdose (3/17/2017)      Hyponatremia (3/17/2017)      Abscess of buttock, right (3/20/2017)      Pain of right hip joint (3/20/2017)      Bilateral pleural effusion (3/20/2017)      CKD (chronic kidney disease) stage 3, GFR 30-59 ml/min (3/21/2017)        Cont wound care and IV abx, will get Dr Reggie Mendoza to see pt while in the hospital

## 2017-03-22 NOTE — ROUTINE PROCESS
Bedside report received from Elida Cevallos RN. Assumed care of patient. Pt found resting in bed. Call light with in reach.

## 2017-03-22 NOTE — PROGRESS NOTES
2901 Assessment complete. Patient alert and oriented x 4. Cap refills brisk and less than 3 sec. Pulses palpable and equal bilaterally. Lung sounds clear, bilaterally. Respirations even and unlabored. Abd soft and nontender. Bowel sounds active to all 4 quads. Voiding without difficulty. Skin warm and dry. Drsg to right hip noted CDI. IV to right hand, site CDI. SCD's to bilaterally lower extremities are being refused at this time. Reports pain 8/10. PAIN medication not due until 11, pt is aware. Ice pack given at this time. Potential medication side effects explained to patient, patient verbalizes understanding, opportunities for questions provided. Patient stable, no apparent distress at this time, bed in locked position, call bell within reach. Patient resting in bed eating breakfast with call light in reach. Pt is aware that he will be having a CT later today. 0756 Personal case management and housing at pt bedside. Went over admission hx and toxicology results, ok to share the information with them per the patient.
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
## 2017-03-22 NOTE — ROUTINE PROCESS
[id number] The documentation for this period is being entered following the guidelines as defined in the [facility] policy by Mel Ramos RN.

## 2017-03-22 NOTE — PROGRESS NOTES
Hospitalist Progress Note-critical care note     Patient: Trinidad Hammer MRN: [id number]  CSN: [id number]    YOB: 1960  Age: 55 y.o. 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 CKD 3 cr was 2.39 on 12/19/2016   stable   3 Sickle cell anemia with crisis. LDH wnl, resolved,   4. narcotic overdose  5 alcohol abuse; on CIWA protocol. Doing well, stopped ciwa protoc   6. soft tissue abscess rt buttock  Will continue Zosyn and  vanc and need I &d per  Dr. Caroline Trent , appreciated. Will continue local wound care , f/u with cx   7 hip pain   No fracture from xray, ct pelvic performed, ortho was called per Dr. Lilo Brooks, appreciated   8 ,bilateral effusion   will have echo , no  chf reported   9 sepsis   Due to abscess, resolving. Subjective: feel fine. Need a letter to [facility] to change apartment. 10 sickle cell anemia   Received transfusion for procedure   Nurse: no acute issue     All questions have been answered.  35 total min's spent on patient care including >50% on counseling/coordinating care. Discussed the above assessments. also discussed labs, medications and hospital course    Review of systems:    General: No fevers or chills. Cardiovascular: No chest pain or pressure. No palpitations. Pulmonary: No shortness of breath. Gastrointestinal: No nausea, vomiting. Vital signs/Intake and Output:  Visit Vitals    /77 (BP 1 Location: Left arm, BP Patient Position: At rest)    Pulse 83    Temp 98.3 °F (36.8 °C)    Resp 18    Ht 5' 5\" (1.651 m)    Wt 68.5 kg (151 lb 0.2 oz)    SpO2 98%    BMI 25.13 kg/m2     Current Shift:  03/22 0701 - 03/22 1900  In: 540 [P.O.:240]  Out: 950 [Urine:950]  Last three shifts:  03/20 1901 - 03/22 0700  In: 1571.2 [I.V.:1250]  Out: 1279 [Urine:1275]    Physical Exam:  General: WD, WN. Alert, cooperative, no acute distress    HEENT: NC, Atraumatic. PERRLA, anicteric sclerae. Lungs: CTA Bilaterally. No Wheezing/Rhonchi/Rales. Heart:  Regular  rhythm,  No murmur, No Rubs, No Gallops  Abdomen: Soft, Non distended, Non tender.  +Bowel sounds,   Extremities: No c/c/e  Psych:   Not anxious or agitated. Neurologic:  No acute neurological deficit. Skin: wound covered with dressing          Labs: Results:       Chemistry Recent Labs      03/22/17   1054  03/21/17   0545   GLU  102*  103*   NA  137  137   K  4.8  5.2   CL  107  109*   CO2  19*  17*   BUN  29*  26*   CREA  2.49*  2.50*   CA  7.8*  8.0*   AGAP  11  11   BUCR  12  10*      CBC w/Diff Recent Labs      03/22/17   1054  03/21/17   1550  03/21/17   0545  03/20/17   1215   WBC  29.6*   --   29.5*  27.9*   RBC  2.92*   --   2.17*  2.34*   HGB  8.6*  6.9*  6.3*  6.9*   HCT  24.6*  20.0*  18.1*  19.7*   PLT  260   --   251  245   GRANS  79*   --   91*  82*   LYMPH  12*   --   7*  6*   EOS  4   --   0  0      Cardiac Enzymes No results for input(s): CPK, CKND1, STEFAN in the last 72 hours.     No lab exists for component: CKRMB, TROIP   Coagulation No results for input(s): PTP, INR, APTT in the last 72 hours. No lab exists for component: INREXT, INREXT    Lipid Panel No results found for: CHOL, CHOLPOCT, CHOLX, CHLST, CHOLV, [id number], HDL, LDL, NLDLCT, DLDL, LDLC, DLDLP, [id number], VLDLC, VLDL, TGL, TGLX, TRIGL, [id number], TRIGP, TGLPOCT, [id number], CHHD, CHHDX   BNP No results for input(s): BNPP in the last 72 hours. Liver Enzymes No results for input(s): TP, ALB, TBIL, AP, SGOT, GPT in the last 72 hours.     No lab exists for component: DBIL   Thyroid Studies Lab Results   Component Value Date/Time    TSH 1.08 03/19/2017 06:05 AM        Procedures/imaging: see electronic medical records for all procedures/Xrays and details which were not copied into this note but were reviewed prior to creation of Andreea Koehler MD
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
## 2017-03-22 NOTE — ROUTINE PROCESS
Bedside and Verbal shift change report given to Tika Hannah RN  (oncoming nurse) by Rasheed Sigala RN (offgoing nurse). Report included the following information SBAR, Kardex, OR Summary, Procedure Summary, Intake/Output, MAR, Accordion, Recent Results and Med Rec Status Options for questions were provided to the oncoming RN as well as the patient.

## 2017-03-22 NOTE — ROUTINE PROCESS
Bedside and Verbal shift change report given to Rashaad Connor RN (oncoming nurse) by Elizabeth Canela RN (offgoing nurse). Report included the following information SBAR, Kardex and MAR. Patient had no acute events overnight. Currently resting in NAD.

## 2017-03-22 NOTE — OP NOTES
[address] [place] [address]  OPERATIVE REPORT    Name:  Kimberlee Patel  MR#:  [id number]  :  1960  Account #:  [de-identified]  Date of Adm:  2017  Date of Surgery:  2017      PREOPERATIVE DIAGNOSIS: Soft tissue abscess, right buttock. POSTOPERATIVE DIAGNOSIS: Soft tissue abscess, right buttock. PROCEDURES PERFORMED: Incision and drainage of right soft  tissue abscess, right buttock. ATTENDING SURGEON: Vista Homans, MD    ANESTHESIA: General.    ESTIMATED BLOOD LOSS: 3 mL. SPECIMENS REMOVED: Culture. INDICATIONS FOR PROCEDURE: This is a 81-year-old male who  has an abscess on his right buttock. He is brought to the operating  room for incision and drainage. DESCRIPTION OF PROCEDURE: The patient was brought in the  operating room, placed on the table in the supine position. After  placing monitors and adequate general anesthesia, a bump was  placed under his right back and right buttock. The right hip/buttock  were prepped and draped in the usual sterile fashion. An elliptical  incision was made over the abscess and skin was excised. There was  foul-smelling purulent material evacuated. It was sent for Gram stain  and culture. A counterincision was made superior to this area. The  wound was thoroughly irrigated with a liter of saline. A 1/4-inch  Penrose drain was placed through both incisions and then secured to  itself with a nylon suture. The wound was then dressed sterilely. The  sponge, instrument, and needle count was correct at the end of  procedure.         MD FRED Cordero / Tal Duong  D:  2017   18:38  T:  2017   07:38  Job #:  [id number]

## 2017-03-22 NOTE — PROGRESS NOTES
Problem: Mobility Impaired (Adult and Pediatric)  Goal: *Acute Goals and Plan of Care (Insert Text)  Physical Therapy Goals  Initiated 3/21/2017 and to be accomplished within 5 day(s)  1. Patient will move from supine <> sit with mod I in prep for out of bed activity and change of position. 2. Patient will perform sit<> stand with mod I with LRAD in prep for transfers/ambulation. 3. Patient will transfer from bed <> chair with mod I with LRAD for time up in chair for completion of ADL activity. 4. Patient will ambulate 150 feet with LRAD for increase functional mobility at discharge. 5. Patient will ascend/descend 15 stairs with handrail(s) with mod I assist for home re-entry as needed. Outcome: Progressing Towards Goal  PHYSICAL THERAPY TREATMENT     Patient: Eros Spangler (61 y.o. male)  Date: 3/22/2017  Diagnosis: Acute hyperkalemia  PERIANAL ABSCESS Acute hyperkalemia  Procedure(s) (LRB):  Incision and drainage of right buttock abcess (Right) 1 Day Post-Op  Precautions: Fall   Chart, physical therapy assessment, plan of care and goals were reviewed. ASSESSMENT:  The patient is doing much better with ambulatory mobility. Patient stood up to a rolling walker with CGA and ambulated about 300 feet in the hallway with CGA/S. Gait is slow but steady with no loss of balance. Patient appeared to tolerate ambulation well and was returned back to his room, supine in bed. Will continue PT for additional gait and stair training as appropriate. Progression toward goals:  [X]      Improving appropriately and progressing toward goals  [ ]      Improving slowly and progressing toward goals  [ ]      Not making progress toward goals and plan of care will be adjusted       PLAN:  Patient continues to benefit from skilled intervention to address the above impairments. Continue treatment per established plan of care. Discharge Recommendations:   To Be Determined  Further Equipment Recommendations for Discharge:  N/A SUBJECTIVE:   Patient stated I could use the bed sheets changed.       OBJECTIVE DATA SUMMARY:   Critical Behavior:  Neurologic State: Alert, Eyes open spontaneously  Orientation Level: Oriented X4  Cognition: Appropriate decision making, Follows commands  Safety/Judgement: Awareness of environment, Fall prevention  Functional Mobility Training:  Bed Mobility:  Rolling: Supervision  Supine to Sit: Supervision  Sit to Supine: Supervision  Transfers:  Sit to Stand: Contact guard assistance  Stand to Sit: Contact guard assistance  Balance:  Sitting: Intact  Standing: Intact; With support  Ambulation/Gait Training:  Distance (ft): 300 Feet (ft)  Assistive Device: Gait belt;Walker, rolling  Ambulation - Level of Assistance: Contact guard assistance;Supervision  Gait Abnormalities: Decreased step clearance  Right Side Weight Bearing: Full  Base of Support: Narrowed  Stance: Right decreased  Speed/Dorcas: Slow  Step Length: Right shortened;Left shortened  Swing Pattern: Right asymmetrical  Interventions: Verbal cues     Pain:  Pain Scale 1: Numeric (0 - 10)  Pain Intensity 1: 8  Pain Location 1: Hip  Pain Orientation 1: Right  Pain Description 1: Aching  Pain Intervention(s) 1: Medication (see MAR)  Activity Tolerance:   Good  Please refer to the flowsheet for vital signs taken during this treatment.   After treatment:   [ ] Patient left in no apparent distress sitting up in chair  [X] Patient left in no apparent distress in bed  [X] Call bell left within reach  [X] Nursing notified  [ ] Caregiver present  [ ] Bed alarm activated      Mayelin Saldivar   Time Calculation: 20 mins

## 2017-03-23 LAB
ABO + RH BLD: NORMAL
ANION GAP BLD CALC-SCNC: 7 MMOL/L (ref 3–18)
ANTIGENS PRESENT RBC DONR: NORMAL
ANTIGENS PRESENT RBC DONR: NORMAL
BACTERIA SPEC CULT: NORMAL
BASOPHILS # BLD AUTO: 0 K/UL (ref 0–0.06)
BASOPHILS # BLD: 0 % (ref 0–2)
BLD PROD TYP BPU: NORMAL
BLD PROD TYP BPU: NORMAL
BLOOD GROUP ANTIBODIES SERPL: NORMAL
BPU ID: NORMAL
BPU ID: NORMAL
BUN SERPL-MCNC: 29 MG/DL (ref 7–18)
BUN/CREAT SERPL: 11 (ref 12–20)
CALCIUM SERPL-MCNC: 8 MG/DL (ref 8.5–10.1)
CHLORIDE SERPL-SCNC: 111 MMOL/L (ref 100–108)
CO2 SERPL-SCNC: 20 MMOL/L (ref 21–32)
CREAT SERPL-MCNC: 2.58 MG/DL (ref 0.6–1.3)
CROSSMATCH RESULT,%XM: NORMAL
CROSSMATCH RESULT,%XM: NORMAL
DATE LAST DOSE: NORMAL
DIFFERENTIAL METHOD BLD: ABNORMAL
EOSINOPHIL # BLD: 0.6 K/UL (ref 0–0.4)
EOSINOPHIL NFR BLD: 3 % (ref 0–5)
ERYTHROCYTE [DISTWIDTH] IN BLOOD BY AUTOMATED COUNT: 18.2 % (ref 11.6–14.5)
GLUCOSE SERPL-MCNC: 111 MG/DL (ref 74–99)
HCT VFR BLD AUTO: 24.4 % (ref 36–48)
HGB BLD-MCNC: 8.5 G/DL (ref 13–16)
LDH SERPL L TO P-CCNC: 174 U/L (ref 81–234)
LYMPHOCYTES # BLD AUTO: 8 % (ref 21–52)
LYMPHOCYTES # BLD: 1.7 K/UL (ref 0.9–3.6)
MAGNESIUM SERPL-MCNC: 1.7 MG/DL (ref 1.8–2.4)
MCH RBC QN AUTO: 28.9 PG (ref 24–34)
MCHC RBC AUTO-ENTMCNC: 34.8 G/DL (ref 31–37)
MCV RBC AUTO: 83 FL (ref 74–97)
MONOCYTES # BLD: 1.1 K/UL (ref 0.05–1.2)
MONOCYTES NFR BLD AUTO: 5 % (ref 3–10)
NEUTS SEG # BLD: 18.9 K/UL (ref 1.8–8)
NEUTS SEG NFR BLD AUTO: 84 % (ref 40–73)
PLATELET # BLD AUTO: 294 K/UL (ref 135–420)
PMV BLD AUTO: 9.5 FL (ref 9.2–11.8)
POTASSIUM SERPL-SCNC: 5.3 MMOL/L (ref 3.5–5.5)
RBC # BLD AUTO: 2.94 M/UL (ref 4.7–5.5)
REPORTED DOSE,DOSE: NORMAL UNITS
REPORTED DOSE/TIME,TMG: 2240
SERVICE CMNT-IMP: NORMAL
SODIUM SERPL-SCNC: 138 MMOL/L (ref 136–145)
SPECIMEN EXP DATE BLD: NORMAL
STATUS OF UNIT,%ST: NORMAL
STATUS OF UNIT,%ST: NORMAL
UNIT DIVISION, %UDIV: 0
UNIT DIVISION, %UDIV: 0
VANCOMYCIN TROUGH SERPL-MCNC: 13.6 UG/ML (ref 10–20)
WBC # BLD AUTO: 22.3 K/UL (ref 4.6–13.2)

## 2017-03-23 PROCEDURE — 80202 ASSAY OF VANCOMYCIN: CPT | Performed by: HOSPITALIST

## 2017-03-23 PROCEDURE — 74011250637 HC RX REV CODE- 250/637: Performed by: FAMILY MEDICINE

## 2017-03-23 PROCEDURE — 83615 LACTATE (LD) (LDH) ENZYME: CPT | Performed by: SURGERY

## 2017-03-23 PROCEDURE — 83735 ASSAY OF MAGNESIUM: CPT | Performed by: SURGERY

## 2017-03-23 PROCEDURE — 74011250636 HC RX REV CODE- 250/636: Performed by: FAMILY MEDICINE

## 2017-03-23 PROCEDURE — 65660000000 HC RM CCU STEPDOWN

## 2017-03-23 PROCEDURE — 97116 GAIT TRAINING THERAPY: CPT

## 2017-03-23 PROCEDURE — 80048 BASIC METABOLIC PNL TOTAL CA: CPT | Performed by: SURGERY

## 2017-03-23 PROCEDURE — 85025 COMPLETE CBC W/AUTO DIFF WBC: CPT | Performed by: SURGERY

## 2017-03-23 PROCEDURE — 74011250637 HC RX REV CODE- 250/637: Performed by: INTERNAL MEDICINE

## 2017-03-23 PROCEDURE — 77010033678 HC OXYGEN DAILY

## 2017-03-23 PROCEDURE — 74011250636 HC RX REV CODE- 250/636: Performed by: HOSPITALIST

## 2017-03-23 PROCEDURE — 36415 COLL VENOUS BLD VENIPUNCTURE: CPT | Performed by: SURGERY

## 2017-03-23 PROCEDURE — 74011000258 HC RX REV CODE- 258: Performed by: HOSPITALIST

## 2017-03-23 RX ORDER — MAGNESIUM SULFATE HEPTAHYDRATE 40 MG/ML
2 INJECTION, SOLUTION INTRAVENOUS ONCE
Status: COMPLETED | OUTPATIENT
Start: 2017-03-23 | End: 2017-03-26

## 2017-03-23 RX ADMIN — Medication 2 MG: at 09:23

## 2017-03-23 RX ADMIN — PROCHLORPERAZINE MALEATE 10 MG: 10 TABLET, FILM COATED ORAL at 21:24

## 2017-03-23 RX ADMIN — Medication 2 MG: at 02:08

## 2017-03-23 RX ADMIN — CARVEDILOL 6.25 MG: 3.12 TABLET, FILM COATED ORAL at 09:23

## 2017-03-23 RX ADMIN — Medication 2 MG: at 23:30

## 2017-03-23 RX ADMIN — ONDANSETRON HYDROCHLORIDE 8 MG: 4 TABLET, FILM COATED ORAL at 21:24

## 2017-03-23 RX ADMIN — PIPERACILLIN SODIUM,TAZOBACTAM SODIUM 2.25 G: 2; .25 INJECTION, POWDER, FOR SOLUTION INTRAVENOUS at 09:29

## 2017-03-23 RX ADMIN — Medication 100 MG: at 09:23

## 2017-03-23 RX ADMIN — MAGNESIUM SULFATE HEPTAHYDRATE 2 G: 40 INJECTION, SOLUTION INTRAVENOUS at 09:24

## 2017-03-23 RX ADMIN — Medication 2 MG: at 14:00

## 2017-03-23 RX ADMIN — CARVEDILOL 6.25 MG: 3.12 TABLET, FILM COATED ORAL at 17:00

## 2017-03-23 RX ADMIN — PIPERACILLIN SODIUM,TAZOBACTAM SODIUM 2.25 G: 2; .25 INJECTION, POWDER, FOR SOLUTION INTRAVENOUS at 21:24

## 2017-03-23 RX ADMIN — PROCHLORPERAZINE MALEATE 10 MG: 10 TABLET, FILM COATED ORAL at 09:23

## 2017-03-23 RX ADMIN — AMLODIPINE BESYLATE 10 MG: 5 TABLET ORAL at 09:23

## 2017-03-23 RX ADMIN — PROCHLORPERAZINE MALEATE 10 MG: 10 TABLET, FILM COATED ORAL at 17:00

## 2017-03-23 RX ADMIN — FOLIC ACID 1 MG: 1 TABLET ORAL at 09:23

## 2017-03-23 RX ADMIN — ONDANSETRON HYDROCHLORIDE 8 MG: 4 TABLET, FILM COATED ORAL at 09:23

## 2017-03-23 RX ADMIN — Medication 10 ML: at 05:58

## 2017-03-23 RX ADMIN — PROCHLORPERAZINE MALEATE 10 MG: 10 TABLET, FILM COATED ORAL at 14:00

## 2017-03-23 RX ADMIN — VITAM B12 100 MCG: 100 TAB at 09:39

## 2017-03-23 RX ADMIN — Medication 2 MG: at 20:17

## 2017-03-23 RX ADMIN — SODIUM CHLORIDE 750 MG: 900 INJECTION, SOLUTION INTRAVENOUS at 22:36

## 2017-03-23 RX ADMIN — Medication 10 ML: at 22:39

## 2017-03-23 RX ADMIN — MEGESTROL ACETATE 200 MG: 40 SUSPENSION ORAL at 09:36

## 2017-03-23 RX ADMIN — PIPERACILLIN SODIUM,TAZOBACTAM SODIUM 2.25 G: 2; .25 INJECTION, POWDER, FOR SOLUTION INTRAVENOUS at 15:58

## 2017-03-23 RX ADMIN — Medication 2 MG: at 05:58

## 2017-03-23 RX ADMIN — PIPERACILLIN SODIUM,TAZOBACTAM SODIUM 2.25 G: 2; .25 INJECTION, POWDER, FOR SOLUTION INTRAVENOUS at 02:08

## 2017-03-23 RX ADMIN — Medication 10 ML: at 13:58

## 2017-03-23 RX ADMIN — Medication 2 MG: at 17:06

## 2017-03-23 NOTE — PROGRESS NOTES
Shift assessment completed. Pt up with walker ambulating. A/Ox4. Pain scale 08/10, meds given Pt denies any SOB or difficulty breathing. Pt denies any chest pain. Bowel sounds active. Palpable radial and pedal pulses. Pt has numbness to left foot prior to admission. Pt denies any calf pain. 0050-dressing saturated, changed with loose 4x4, ABD pad and tape.  Pt tolerated well

## 2017-03-23 NOTE — PROGRESS NOTES
Problem: Mobility Impaired (Adult and Pediatric)  Goal: *Acute Goals and Plan of Care (Insert Text)  Physical Therapy Goals  Initiated 3/21/2017 and to be accomplished within 5 day(s)  1. Patient will move from supine <> sit with mod I in prep for out of bed activity and change of position. 2. Patient will perform sit<> stand with mod I with LRAD in prep for transfers/ambulation. 3. Patient will transfer from bed <> chair with mod I with LRAD for time up in chair for completion of ADL activity. 4. Patient will ambulate 150 feet with LRAD for increase functional mobility at discharge. 5. Patient will ascend/descend 15 stairs with handrail(s) with mod I assist for home re-entry as needed. Outcome: Progressing Towards Goal  PHYSICAL THERAPY TREATMENT     Patient: George Mtz (50 y.o. male)  Date: 3/23/2017  Diagnosis: Acute hyperkalemia  PERIANAL ABSCESS Acute hyperkalemia  Procedure(s) (LRB):  Incision and drainage of right buttock abcess (Right) 2 Days Post-Op  Precautions: Fall   Chart, physical therapy assessment, plan of care and goals were reviewed. ASSESSMENT:  Patient participated in stair training this session, negotiating 12 stair steps with use of right hand rail and a single point cane. Patient appeared to be slightly unsteady while negotiating steps but did not lose his balance. Patient did report feeling light headed and dizzy after negotiating stairs and ambulated back towards his room. Returned the patient to supine in bed. Patient is approaching his goals but may benefit from additional PT to maximize strength and safety. Recommend home health at this time. Progression toward goals:  [X]      Improving appropriately and progressing toward goals  [ ]      Improving slowly and progressing toward goals  [ ]      Not making progress toward goals and plan of care will be adjusted       PLAN:  Patient continues to benefit from skilled intervention to address the above impairments.   Continue treatment per established plan of care. Discharge Recommendations:  Home Health  Further Equipment Recommendations for Discharge:  N/A       SUBJECTIVE:   Patient stated I can do the stairs if I get a cane.       OBJECTIVE DATA SUMMARY:   Critical Behavior:  Neurologic State: Appropriate for age  Orientation Level: Oriented X4  Cognition: Appropriate decision making, Appropriate for age attention/concentration, Appropriate safety awareness, Follows commands  Safety/Judgement: Fall prevention  Functional Mobility Training:  Bed Mobility:  Rolling: Supervision  Supine to Sit: Supervision  Transfers:  Sit to Stand: Supervision  Stand to Sit: Supervision  Balance:  Sitting: Intact  Standing: Intact; With support  Ambulation/Gait Training:  Distance (ft): 125 Feet (ft)  Assistive Device: Gait belt;Walker, rolling  Ambulation - Level of Assistance: Supervision  Gait Abnormalities: Decreased step clearance  Right Side Weight Bearing: As tolerated  Base of Support: Shift to left  Stance: Right decreased  Speed/Dorcas: Slow  Step Length: Right shortened;Left shortened  Swing Pattern: Right asymmetrical  Interventions: Verbal cues     Stairs:  Number of Stairs Trained: 12  Stairs - Level of Assistance: Contact guard assistance  Rail Use: Right      Pain:  Pain Scale 1: Numeric (0 - 10)  Pain Intensity 1: 8  Pain Location 1: Buttocks; Hip  Pain Orientation 1: Right  Pain Description 1: Aching  Pain Intervention(s) 1: Medication (see MAR)  Activity Tolerance:   Fair  Please refer to the flowsheet for vital signs taken during this treatment.   After treatment:   [ ] Patient left in no apparent distress sitting up in chair  [X] Patient left in no apparent distress in bed  [X] Call bell left within reach  [X] Nursing notified  [ ] Caregiver present  [ ] Bed alarm activated      Seabags Parrish   Time Calculation: 25 mins

## 2017-03-23 NOTE — CDMP QUERY
Pt admitted with AMS, EDGAR, sickle cell crisis, leukocytosis. Pt also noted to have soft tissue abscess on right buttock that had I&D performed. **On 3/22-PN states \"Sepsis-due to abscess-resolving\"    Please clarify if this patient had Sepsis POA. Thank you.   Wilfredo Izaguirre RN [facility]  [phone]

## 2017-03-23 NOTE — PROGRESS NOTES
Hospitalist Progress Note-critical care note     Patient: Sarahi Ramesh MRN: [id number]  CSN: [id number]    YOB: 1960  Age: 55 y.o. 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 CKD 3 cr was 2.39 on 12/19/2016   stable   3 Sickle cell anemia with crisis. LDH wnl, resolved,   4. narcotic overdose  5 alcohol abuse; on CIWA protocol. Doing well, stopped ciwa protoc   6. soft tissue abscess rt buttock  Will continue Zosyn and  vanc and need I &d per  Dr. Weston Rodrigez , appreciated. Will continue local wound care , f/u with cx  Case discussed with Dr. John Matos for abx recommendation per ortho concerns   7 hip pain   No fracture from xray, ct pelvic performed, no active joint infection noted,   8 ,bilateral effusion   off nc O2 now.  echo ordered, not performed, will reschedule if clinical sob   9 sepsis   Due to abscess, resolving.  Wbc improving,   10 sickle cell anemia   Received transfusion for procedure       Subjective: no fever/chills/diarrhea Nurse: no acute issue       Review of systems:    General: No fevers or chills. Cardiovascular: No chest pain or pressure. No palpitations. Pulmonary: No shortness of breath. Gastrointestinal: No nausea, vomiting. Vital signs/Intake and Output:  Visit Vitals    /83    Pulse 79    Temp 97.7 °F (36.5 °C)    Resp 18    Ht 5' 5\" (1.651 m)    Wt 72.9 kg (160 lb 11.5 oz)    SpO2 100%    BMI 26.74 kg/m2     Current Shift:  03/23 0701 - 03/23 1900  In: 100 [I.V.:100]  Out: -   Last three shifts:  03/21 1901 - 03/23 0700  In: 2341.2 [P.O.:1320; I.V.:400]  Out: 6520 [Urine:2575]    Physical Exam:  General: WD, WN. Alert, cooperative, no acute distress    HEENT: NC, Atraumatic. PERRLA, anicteric sclerae. Lungs: CTA Bilaterally. No Wheezing/Rhonchi/Rales. Heart:  Regular  rhythm,  No murmur, No Rubs, No Gallops  Abdomen: Soft, Non distended, Non tender.  +Bowel sounds,   Extremities: No c/c/e  Psych:   Not anxious or agitated. Neurologic:  No acute neurological deficit. Skin: wound covered with dressing          Labs: Results:       Chemistry Recent Labs      03/23/17   0336  03/22/17   1054  03/21/17   0545   GLU  111*  102*  103*   NA  138  137  137   K  5.3  4.8  5.2   CL  111*  107  109*   CO2  20*  19*  17*   BUN  29*  29*  26*   CREA  2.58*  2.49*  2.50*   CA  8.0*  7.8*  8.0*   AGAP  7  11  11   BUCR  11*  12  10*      CBC w/Diff Recent Labs      03/23/17   0336  03/22/17   1054  03/21/17   1550  03/21/17   0545   WBC  22.3*  29.6*   --   29.5*   RBC  2.94*  2.92*   --   2.17*   HGB  8.5*  8.6*  6.9*  6.3*   HCT  24.4*  24.6*  20.0*  18.1*   PLT  294  260   --   251   GRANS  84*  79*   --   91*   LYMPH  8*  12*   --   7*   EOS  3  4   --   0      Cardiac Enzymes No results for input(s): CPK, CKND1, STEFAN in the last 72 hours. No lab exists for component: CKRMB, TROIP   Coagulation No results for input(s): PTP, INR, APTT in the last 72 hours.     No lab exists for component: INREXT, INREXT    Lipid Panel No results found for: CHOL, CHOLPOCT, CHOLX, CHLST, CHOLV, [id number], HDL, LDL, NLDLCT, DLDL, LDLC, DLDLP, [id number], VLDLC, VLDL, TGL, TGLX, TRIGL, [id number], TRIGP, TGLPOCT, [id number], CHHD, CHHDX   BNP No results for input(s): BNPP in the last 72 hours. Liver Enzymes No results for input(s): TP, ALB, TBIL, AP, SGOT, GPT in the last 72 hours.     No lab exists for component: DBIL   Thyroid Studies Lab Results   Component Value Date/Time    TSH 1.08 03/19/2017 06:05 AM        Procedures/imaging: see electronic medical records for all procedures/Xrays and details which were not copied into this note but were reviewed prior to creation of Dav Sawant MD

## 2017-03-23 NOTE — PROGRESS NOTES
Progress Note    Patient: Glendy Mejias MRN: [id number]  CSN: [id number]    YOB: 1960  Age: 55 y.o. Sex: male    DOA: 3/16/2017 LOS:  LOS: 6 days                    Subjective:     Pain is better    Objective:      Visit Vitals    /78 (BP 1 Location: Left arm, BP Patient Position: At rest)    Pulse 83    Temp 98.4 °F (36.9 °C)    Resp 17    Ht 5' 5\" (1.651 m)    Wt 72.9 kg (160 lb 11.5 oz)    SpO2 98%    BMI 26.74 kg/m2       Physical Exam:  Still with thickening at abscess site, little drainage    Intake and Output:  Current Shift:  03/23 0701 - 03/23 1900  In: 500 [P.O.:400; I.V.:100]  Out: 350 [Urine:350]  Last three shifts:  03/21 1901 - 03/23 0700  In: 2341.2 [P.O.:1320; I.V.:400]  Out: 6161 [Urine:2575]    Labs: Results:       Chemistry Recent Labs      03/23/17   0336  03/22/17   1054  03/21/17   0545   GLU  111*  102*  103*   NA  138  137  137   K  5.3  4.8  5.2   CL  111*  107  109*   CO2  20*  19*  17*   BUN  29*  29*  26*   CREA  2.58*  2.49*  2.50*   CA  8.0*  7.8*  8.0*   AGAP  7  11  11   BUCR  11*  12  10*      CBC w/Diff Recent Labs      03/23/17   0336  03/22/17   1054  03/21/17   1550  03/21/17   0545   WBC  22.3*  29.6*   --   29.5*   RBC  2.94*  2.92*   --   2.17*   HGB  8.5*  8.6*  6.9*  6.3*   HCT  24.4*  24.6*  20.0*  18.1*   PLT  294  260   --   251   GRANS  84*  79*   --   91*   LYMPH  8*  12*   --   7*   EOS  3  4   --   0      Cardiac Enzymes No results for input(s): CPK, CKND1, STEFAN in the last 72 hours. No lab exists for component: CKRMB, TROIP   Coagulation No results for input(s): PTP, INR, APTT in the last 72 hours. No lab exists for component: INREXT    Lipid Panel No results found for: CHOL, CHOLPOCT, CHOLX, CHLST, CHOLV, [id number], HDL, LDL, NLDLCT, DLDL, LDLC, DLDLP, [id number], VLDLC, VLDL, TGL, TGLX, TRIGL, [id number], TRIGP, TGLPOCT, [id number], CHHD, CHHDX   BNP No results for input(s): BNPP in the last 72 hours.    Liver Enzymes No results for input(s): TP, ALB, TBIL, AP, SGOT, GPT in the last 72 hours.     No lab exists for component: DBIL   Thyroid Studies Lab Results   Component Value Date/Time    TSH 1.08 03/19/2017 06:05 AM            Medications Reviewed      Assessment/Plan     Principal Problem:    Acute hyperkalemia (3/16/2017)    Active Problems:    Sickle cell anemia (HCC) (11/4/2014)      Sickle cell crisis ([facility] [address] (11/20/2016)      EDGAR (acute kidney injury) ([facility] (11/20/2016)      Narcotic overdose (3/17/2017)      Hyponatremia (3/17/2017)      Abscess of buttock, right (3/20/2017)      Pain of right hip joint (3/20/2017)      Bilateral pleural effusion (3/20/2017)      CKD (chronic kidney disease) stage 3, GFR 30-59 ml/min (3/21/2017)        Will follow

## 2017-03-23 NOTE — CONSULTS
[address] [place] [address] Rd    Name:  Nile Hernández  MR#:  [id number]  :  1960  Account #:  [de-identified]  Date of Adm:  2017  Date of Consultation:  2017      INFECTIOUS DISEASE CONSULTATION    IMPRESSION: A 61-year-old male referred by Dr. Gregorio Moy for further  evaluation and management of a right buttock abscess. RECOMMENDATIONS: Right buttock abscess: Status post incision  and drainage. The patient reports recurrent furunculosis on his arms,  legs, groin and behind. He reports similar lesions in his family  members. Overall, I suspect this syndrome represents manifestation of  recurrent MRSA furunculosis though this has not been clearly  documented. Now status post incision and drainage. There is no evidence of bone  involvement. As such, await final culture. Would continue empiric  antibiotics. Would anticipate changing to oral clindamycin x2 week  course. If MRSA is indeed isolated, we will discuss further with this  patient approaches to decolonization. Thank you for allowing me to participate in the care of your patient. We  will continue to keep you informed of his progress. HISTORY OF PRESENT ILLNESS: The patient is a 61-year-old male  with history of hypertension and sickle cell disease. The patient states  he developed a right buttock abscess approximately 1 week ago with  subsequent complaints of pain crisis. The patient presented to [facility] on 2017 for further evaluation and  management of this process. He has since undergone incision and  drainage. Presently, the patient states he feels better. He denies additional  complaints of headache, facial pain, mouth pain, neck pain, chest pain,  cough, sputum production, abdominal pain, nausea, vomiting, diarrhea. No skin rash. A full 12-point review of systems was performed unless  specifically stated above. PAST MEDICAL HISTORY: Hypertension, sickle cell disease. No  known history of diabetes, heart disease, chronic lung disease. ALLERGIES: NONE. SOCIAL HISTORY: The patient states he drinks a 12-pack of beer on  a weekly basis. The patient smokes tobacco. Denies injection drug  abuse. Currently not working. FAMILY HISTORY: Reported history of recurrent skin and soft tissue  infections. REVIEW OF SYSTEMS  As above. PHYSICAL EXAMINATION  GENERAL: Awake, alert, oriented x3, in no acute distress. VITAL SIGNS: Temperature 98.3, blood pressure 117/62, heart rate  82. HEENT: Normocephalic, atraumatic. No icterus. Oral cavity clear, no  thrush or ulcers noted. NECK: Supple, No JVD. CHEST: Clear to auscultation bilaterally. No wheeze or rales. CARDIOVASCULAR: Normal S1, S2. No murmurs. ABDOMEN: Soft, nontender. No rebound or guarding, no  hepatosplenomegaly. BACK: Normal contour, no CVA tenderness. GENITOURINARY: The patient's right buttock with a Penrose drain in  place. There is minimal surrounding erythema, no expressible  purulence. No foul odor, no crepitance. EXTREMITIES: Distal edema. No rash or petechiae. LABORATORY DATA: White blood cells 22,000, hemoglobin 8.5,  platelet count 722,395. Serum creatinine 2.6. Blood cultures sterile. Gram stain with gram-positive cocci in chains, alpha hemolytic strep. Further identification and susceptibilities pending. CT pelvis  independently reviewed.         MD FIDELINA Bains / Danni Chan  D:  03/23/2017   14:58  T:  03/23/2017   15:14  Job #:  [id number]

## 2017-03-23 NOTE — PROGRESS NOTES
80 Pt received from offgoing nurse without any signs or symptoms of distress. Pt vitals are stable and within normal limits. Pt bed in low position with wheels locked and call bell within reach. 1658 Assessment completed and documented in flow sheet. Pt denies any further needs at this time. Pt in NAD with bed in low position, wheels locked and call bell within reach. 1700 Scheduled medications administered as ordered. 1706 Pain medication administered as per PRN order for c/o pain. See flow sheets for follow up documentation. 2017 Pain medication administered as per PRN order for c/o pain. See flow sheets for follow up documentation. 2300 Shift summary: Patient spent uneventful shift. No issues noted. See flow sheet and MAR.  2310 Bedside and Verbal shift change report given to [address] [place] [address] (oncoming nurse) by Madelaine Hurtado RN (offgoing nurse). Report included the following information SBAR, Intake/Output and MAR.

## 2017-03-23 NOTE — ROUTINE PROCESS
Bedside shift change report given to DYLAN Daniels RN (oncoming nurse) by Iftikhar Polanco RN  (offgoing nurse). Report included the following information SBAR, Kardex and MAR.
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
## 2017-03-23 NOTE — PROGRESS NOTES
INITIAL NUTRITION ASSESSMENT     RECOMMENDATIONS/PLAN:   - Ensure Enlive TID with meals- continue  - Continue regular diet, encourage adequate PO intake of meals  - Replete magnesium (1.7)    REASON FOR ASSESSMENT:   [x] LOS    NUTRITION ASSESSMENT:   Client History: 55 yrs old Male admitted with CKD 3, sickle cell crisis (resolved), narcotic overdose, alcohol abuse, and soft tissue abscess of R buttock, s/p I&D on 3/21. Pt reports fair/variable appetite, has not received Ensure Enlive yet, only eating bites of most meals. Mild nausea, no vomiting. No difficulty chewing or swallowing.      PMHx: sickle cell anemia, depression, HTN, hep C, arthritis   Cultural/[place] Food Preferences: None Identified    FOOD/NUTRITION HISTORY  Diet History: no appetite changes PTA   Food Allergies:  [x] NKFA     [] Yes      NUTRITION INTAKE   Diet Order:  Regular      Average PO Intake:        % Diet Eaten   03/23/17 1409 100 %   03/22/17 1916 50 %   03/22/17 0957 70 %   03/20/17 1235 20 %   03/20/17 0900 50 %   03/19/17 2138 50 %   03/19/17 1737 35 %   03/19/17 1432 50 %   03/19/17 1304 90 %   Pertinent Medications:  [x] Reviewed; vit B12, folate, megace, morphine, zofran, abx, compazine, vit B6, abx  Insulin:  [] SSI  [] Pre-meal   []  Basal   [] Drip  [x] None  Pt expected to meet estimated nutrient needs through next review:          [x]  Yes     [] No;  ANTHROPOMETRICS  Height: 5' 5\" (165.1 cm)       Weight: 72.9 kg (160 lb 11.5 oz)    BMI: 26.7 kg/m^2  -  overweight (25.0%-29.9% BMI)        Weight change: stable                                  Comparison to Reference Standards:  IBW: 136 lbs      %IBW: 118%      AdjBW: N/A    NUTRITION-FOCUSED PHYSICAL ASSESSMENT  Skin: abscess/incision to R hip.     GI: last BM 3/23, abdomen is soft and NT per MD    BIOCHEMICAL DATA & MEDICAL TESTS  Pertinent Labs:  [x] Reviewed; Hgb 8.5, Hct 24.4, Mg 1.7, CRP 8.3     NUTRITION PRESCRIPTION  Calories: 1832-7991 kcal/day based on 25-30 kcal/kg  Protein: 58-73 g/day based on 0.8-1 g/kg  CHO: 228 g/day based on 50% of total energy  Fluid: 7318-7503 ml/day based on 1 kcal/ml      NUTRITION DIAGNOSES:   1. At risk of inadequate oral intake related to sickle cell crisis as evidenced by PO intake 57% of meals and c/o nausea. NUTRITION INTERVENTIONS:   INTERVENTIONS:        GOALS:  1. Ensure Enlive TID, replete magnesium 1. >75% PO intake of meals/supplements, maintain body weight, lytes WNL by next review 3-5 days     LEARNING NEEDS (Diet, Supplementation, Food/Nutrient-Drug Interaction):   [x] None Identified  [] Inpatient education provided/documented    [] Identified and patient:  [] Declined     [] Was not appropriate/indicated  NUTRITION MONITORING /EVALUATION:   Follow PO intake  Monitor wt  Monitor for additional supplement needs    [] Participated in Interdisciplinary Rounds  [x] Interdisciplinary Care Plan Reviewed/Documented  DISCHARGE NUTRITION RECOMMENDATIONS ADDRESSED:     [x] Yes- recommended low na diet (see nutrition d/c instructions)     NUTRITION RISK:     [x]  At risk                     []  Not currently at risk     Will follow-up per policy.   Jeannette Rhodes RD  PAGER:  [phone]

## 2017-03-23 NOTE — PROGRESS NOTES
0725: Bedside report from Karen Barajas RN. Chart check completed. Pt updated on plan of care for shift. Made aware of next pain med admin time, if needed. Visualized R hip dressing -CDI. Call bell within reach. Pt voicing no complaints at this time. 1620: Bedside shift change report given to CLOVIS Garcia RN (oncoming nurse) by Joelle Thomas. Faizan Roman RN (offgoing nurse). Report included the following information SBAR, Kardex, MAR and Recent Results.

## 2017-03-24 LAB
ANION GAP BLD CALC-SCNC: 10 MMOL/L (ref 3–18)
BASOPHILS # BLD AUTO: 0 K/UL (ref 0–0.06)
BASOPHILS # BLD: 0 % (ref 0–2)
BUN SERPL-MCNC: 25 MG/DL (ref 7–18)
BUN/CREAT SERPL: 10 (ref 12–20)
CALCIUM SERPL-MCNC: 8.1 MG/DL (ref 8.5–10.1)
CHLORIDE SERPL-SCNC: 110 MMOL/L (ref 100–108)
CO2 SERPL-SCNC: 17 MMOL/L (ref 21–32)
CREAT SERPL-MCNC: 2.4 MG/DL (ref 0.6–1.3)
DIFFERENTIAL METHOD BLD: ABNORMAL
EOSINOPHIL # BLD: 0.5 K/UL (ref 0–0.4)
EOSINOPHIL NFR BLD: 2 % (ref 0–5)
ERYTHROCYTE [DISTWIDTH] IN BLOOD BY AUTOMATED COUNT: 19 % (ref 11.6–14.5)
GLUCOSE SERPL-MCNC: 100 MG/DL (ref 74–99)
HCT VFR BLD AUTO: 25.2 % (ref 36–48)
HGB BLD-MCNC: 8.5 G/DL (ref 13–16)
LDH SERPL L TO P-CCNC: 195 U/L (ref 81–234)
LYMPHOCYTES # BLD AUTO: 10 % (ref 21–52)
LYMPHOCYTES # BLD: 2.1 K/UL (ref 0.9–3.6)
MAGNESIUM SERPL-MCNC: 2.1 MG/DL (ref 1.8–2.4)
MCH RBC QN AUTO: 28.6 PG (ref 24–34)
MCHC RBC AUTO-ENTMCNC: 33.7 G/DL (ref 31–37)
MCV RBC AUTO: 84.8 FL (ref 74–97)
MONOCYTES # BLD: 1.5 K/UL (ref 0.05–1.2)
MONOCYTES NFR BLD AUTO: 7 % (ref 3–10)
NEUTS SEG # BLD: 17 K/UL (ref 1.8–8)
NEUTS SEG NFR BLD AUTO: 81 % (ref 40–73)
PLATELET # BLD AUTO: 339 K/UL (ref 135–420)
PMV BLD AUTO: 10.2 FL (ref 9.2–11.8)
POTASSIUM SERPL-SCNC: 5.3 MMOL/L (ref 3.5–5.5)
RBC # BLD AUTO: 2.97 M/UL (ref 4.7–5.5)
SODIUM SERPL-SCNC: 137 MMOL/L (ref 136–145)
WBC # BLD AUTO: 21.2 K/UL (ref 4.6–13.2)

## 2017-03-24 PROCEDURE — 74011250636 HC RX REV CODE- 250/636: Performed by: FAMILY MEDICINE

## 2017-03-24 PROCEDURE — 83615 LACTATE (LD) (LDH) ENZYME: CPT | Performed by: SURGERY

## 2017-03-24 PROCEDURE — 74011000258 HC RX REV CODE- 258: Performed by: INTERNAL MEDICINE

## 2017-03-24 PROCEDURE — 83735 ASSAY OF MAGNESIUM: CPT | Performed by: SURGERY

## 2017-03-24 PROCEDURE — 85025 COMPLETE CBC W/AUTO DIFF WBC: CPT | Performed by: SURGERY

## 2017-03-24 PROCEDURE — 74011250637 HC RX REV CODE- 250/637: Performed by: INTERNAL MEDICINE

## 2017-03-24 PROCEDURE — 65660000000 HC RM CCU STEPDOWN

## 2017-03-24 PROCEDURE — 74011250636 HC RX REV CODE- 250/636: Performed by: HOSPITALIST

## 2017-03-24 PROCEDURE — 80048 BASIC METABOLIC PNL TOTAL CA: CPT | Performed by: SURGERY

## 2017-03-24 PROCEDURE — 74011000258 HC RX REV CODE- 258: Performed by: HOSPITALIST

## 2017-03-24 PROCEDURE — 74011250636 HC RX REV CODE- 250/636: Performed by: INTERNAL MEDICINE

## 2017-03-24 PROCEDURE — 74011250637 HC RX REV CODE- 250/637: Performed by: FAMILY MEDICINE

## 2017-03-24 PROCEDURE — 36415 COLL VENOUS BLD VENIPUNCTURE: CPT | Performed by: SURGERY

## 2017-03-24 RX ADMIN — Medication 2 MG: at 07:48

## 2017-03-24 RX ADMIN — ONDANSETRON HYDROCHLORIDE 8 MG: 4 TABLET, FILM COATED ORAL at 09:01

## 2017-03-24 RX ADMIN — PROCHLORPERAZINE MALEATE 10 MG: 10 TABLET, FILM COATED ORAL at 21:37

## 2017-03-24 RX ADMIN — AMPICILLIN SODIUM AND SULBACTAM SODIUM 3 G: 2; 1 INJECTION, POWDER, FOR SOLUTION INTRAMUSCULAR; INTRAVENOUS at 15:51

## 2017-03-24 RX ADMIN — CARVEDILOL 6.25 MG: 3.12 TABLET, FILM COATED ORAL at 09:01

## 2017-03-24 RX ADMIN — MEGESTROL ACETATE 200 MG: 40 SUSPENSION ORAL at 14:04

## 2017-03-24 RX ADMIN — Medication 2 MG: at 12:34

## 2017-03-24 RX ADMIN — FOLIC ACID 1 MG: 1 TABLET ORAL at 09:01

## 2017-03-24 RX ADMIN — Medication 2 MG: at 21:37

## 2017-03-24 RX ADMIN — PROCHLORPERAZINE MALEATE 10 MG: 10 TABLET, FILM COATED ORAL at 12:33

## 2017-03-24 RX ADMIN — ONDANSETRON HYDROCHLORIDE 8 MG: 4 TABLET, FILM COATED ORAL at 21:37

## 2017-03-24 RX ADMIN — AMPICILLIN SODIUM AND SULBACTAM SODIUM 3 G: 2; 1 INJECTION, POWDER, FOR SOLUTION INTRAMUSCULAR; INTRAVENOUS at 21:38

## 2017-03-24 RX ADMIN — VITAM B12 100 MCG: 100 TAB at 09:00

## 2017-03-24 RX ADMIN — AMLODIPINE BESYLATE 10 MG: 5 TABLET ORAL at 09:01

## 2017-03-24 RX ADMIN — PROCHLORPERAZINE MALEATE 10 MG: 10 TABLET, FILM COATED ORAL at 17:55

## 2017-03-24 RX ADMIN — PIPERACILLIN SODIUM,TAZOBACTAM SODIUM 2.25 G: 2; .25 INJECTION, POWDER, FOR SOLUTION INTRAVENOUS at 09:00

## 2017-03-24 RX ADMIN — Medication 10 ML: at 06:00

## 2017-03-24 RX ADMIN — Medication 10 ML: at 21:38

## 2017-03-24 RX ADMIN — Medication 100 MG: at 09:01

## 2017-03-24 RX ADMIN — PIPERACILLIN SODIUM,TAZOBACTAM SODIUM 2.25 G: 2; .25 INJECTION, POWDER, FOR SOLUTION INTRAVENOUS at 03:06

## 2017-03-24 RX ADMIN — Medication 2 MG: at 15:50

## 2017-03-24 RX ADMIN — CARVEDILOL 6.25 MG: 3.12 TABLET, FILM COATED ORAL at 17:55

## 2017-03-24 RX ADMIN — DOXEPIN HYDROCHLORIDE 50 MG: 50 CAPSULE ORAL at 21:38

## 2017-03-24 RX ADMIN — Medication 2 MG: at 18:49

## 2017-03-24 RX ADMIN — Medication 2 MG: at 03:07

## 2017-03-24 RX ADMIN — Medication 10 ML: at 15:56

## 2017-03-24 RX ADMIN — PROCHLORPERAZINE MALEATE 10 MG: 10 TABLET, FILM COATED ORAL at 09:00

## 2017-03-24 NOTE — PROGRESS NOTES
Pharmacy Dosing Services: Vancomycin   CrCl ~ 28.1 ml/min  WBC = 22.3  Temp = 98.7  Vancomycin trough = 13.6 ( Goal range 10-15 for skin and soft tissue infection )  Continue Vancomycin at current dose of 750 mg IVPB q24h    Pharmacy to continue to follow and adjust dose as necessary  Beatriz Frye  [phone]

## 2017-03-24 NOTE — PROGRESS NOTES
Met with pt's [facility] CMgr Stephanie ([phone]) who had come to [facility] to meet with pt (pt had previously given consent for me to speak with her). Ms Natan Arreguin reported that:  pt lived with a roommate at [address] [place] [address] [place] [address] which was a 3rd floor walk-up apartment; pt was active with [facility]; pt was active with Dr Luh Taylor with [facility] (next to [facility]); pt's last apt with Dr Luh Taylor was 3-9-17 and his next apt was 5-31-17 at 11:00. Ms Natan Arreguin reported that:  pt received support services from the program such as transportation for shopping and MD appts ; pt was on \"a wait list to get on a wait list\" for the [facility] methadone clinic; pt's [facility] CMgr was Sadia Jaffe ([phone]). Met with pt who stated that:  he planned to return to his apt on [address]; he was not interested in placement; he was not interested in having home health f/u. Will f/u with pt on Monday.

## 2017-03-24 NOTE — PROGRESS NOTES
Late entry for 3-23-17: Met with pt who stated that he hoped to return to his apt which he shared with a roommate. Pt reported he was working on getting a first floor apt and provided the card of his [facility] [address] [phone]. Met briefly with Rebecca and pt; will f/u tomorrow.

## 2017-03-24 NOTE — PROGRESS NOTES
Bedside and Verbal shift change report given to Tamara Kelly RN (oncoming nurse) by Mayelin Quiñones   (offgoing nurse). Report included the following information SBAR, Kardex and MAR.

## 2017-03-24 NOTE — ROUTINE PROCESS
Assumed care of pt. Pt alert no sign of distress. Call light within reach. Bed in lowest position. IDR completed at the bedside.

## 2017-03-24 NOTE — PROGRESS NOTES
Problem: Mobility Impaired (Adult and Pediatric)  Goal: *Acute Goals and Plan of Care (Insert Text)  Physical Therapy Goals  Initiated 3/21/2017 and to be accomplished within 5 day(s)  1. Patient will move from supine <> sit with mod I in prep for out of bed activity and change of position. 2. Patient will perform sit<> stand with mod I with LRAD in prep for transfers/ambulation. 3. Patient will transfer from bed <> chair with mod I with LRAD for time up in chair for completion of ADL activity. 4. Patient will ambulate 150 feet with LRAD for increase functional mobility at discharge. 5. Patient will ascend/descend 15 stairs with handrail(s) with mod I assist for home re-entry as needed. Outcome: Progressing Towards Goal     1410 Pt states he is feeling sick to his stomach and not up to PT at this time, but willing to participate later in day. 1525 - Pt in SPA tx. Will f/u tomorrow.
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
## 2017-03-24 NOTE — PROGRESS NOTES
Hospitalist Progress Note-critical care note     Patient: Clair Mariee MRN: [id number]  CSN: [id number]    YOB: 1960  Age: 55 y.o. 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 CKD 3 cr was 2.39 on 12/19/2016   stable   3 Sickle cell anemia with crisis. LDH wnl, resolved,   4. narcotic overdose  5 alcohol abuse; on CIWA protocol. Doing well, stopped ciwa protoc   6. soft tissue abscess rt buttock  Will continue Zosyn and  Vanc, wbc improving.  - I &d per  Dr. Deb Gordon , appreciated. Will continue local wound care ,   -wound cx: POSSIBLE  STREPTOCOCCI, ALPHA HEMOLYTIC  Case discussed with Dr. Qi Zavaleta , no iv abx needed on d.c   7 hip pain   No fracture from xray, ct pelvic performed, no active joint infection noted, appreciated ortho on board   8 ,bilateral effusion   off nc O2 now.  echo ordered, not performed, will reschedule if clinical sob   9 sepsis   Due to abscess, resolving.  Wbc improving,   10 sickle cell anemia   Received transfusion for procedure , resolved       Subjective: no fever/chills/diarrhea . Feel fine     Nurse: no acute issue         Review of systems:    General: No fevers or chills. Cardiovascular: No chest pain or pressure. No palpitations. Pulmonary: No shortness of breath. Gastrointestinal: No nausea, vomiting. Vital signs/Intake and Output:  Visit Vitals    /83 (BP 1 Location: Left arm, BP Patient Position: At rest;Supine; Head of bed elevated (Comment degrees))    Pulse 84    Temp 98.2 °F (36.8 °C)    Resp 14    Ht 5' 5\" (1.651 m)    Wt 73.4 kg (161 lb 14.4 oz)    SpO2 100%    BMI 26.94 kg/m2     Current Shift:  03/24 0701 - 03/24 1900  In: 250 [P.O.:250]  Out: -   Last three shifts:  03/22 1901 - 03/24 0700  In: 5002 [P.O.:1720; I.V.:850]  Out: 2625 [Urine:2625]    Physical Exam:  General: WD, WN. Alert, cooperative, no acute distress    HEENT: NC, Atraumatic. PERRLA, anicteric sclerae. Lungs: CTA Bilaterally. No Wheezing/Rhonchi/Rales. Heart:  Regular  rhythm,  No murmur, No Rubs, No Gallops  Abdomen: Soft, Non distended, Non tender.  +Bowel sounds,   Extremities: No c/c/e  Psych:   Not anxious or agitated. Neurologic:  No acute neurological deficit. Skin: wound covered with dressing          Labs: Results:       Chemistry Recent Labs      03/24/17 0222 03/23/17 0336 03/22/17   1054   GLU  100*  111*  102*   NA  137  138  137   K  5.3  5.3  4.8   CL  110*  111*  107   CO2  17*  20*  19*   BUN  25*  29*  29*   CREA  2.40*  2.58*  2.49*   CA  8.1*  8.0*  7.8*   AGAP  10  7  11   BUCR  10*  11*  12      CBC w/Diff Recent Labs      03/24/17 0222 03/23/17 0336 03/22/17   1054   WBC  21.2*  22.3*  29.6*   RBC  2.97*  2.94*  2.92*   HGB  8.5*  8.5*  8.6*   HCT  25.2*  24.4*  24.6*   PLT  339  294  260   GRANS  81*  84*  79*   LYMPH  10*  8*  12*   EOS  2  3  4      Cardiac Enzymes No results for input(s): CPK, CKND1, STEFAN in the last 72 hours.     No lab exists for component: CKRMB, TROIP   Coagulation No results for input(s): PTP, INR, APTT in the last 72 hours. No lab exists for component: INREXT, INREXT    Lipid Panel No results found for: CHOL, CHOLPOCT, CHOLX, CHLST, CHOLV, [id number], HDL, LDL, NLDLCT, DLDL, LDLC, DLDLP, 539133, VLDLC, VLDL, TGL, TGLX, TRIGL, [id number], TRIGP, TGLPOCT, [id number], CHHD, CHHDX   BNP No results for input(s): BNPP in the last 72 hours. Liver Enzymes No results for input(s): TP, ALB, TBIL, AP, SGOT, GPT in the last 72 hours.     No lab exists for component: DBIL   Thyroid Studies Lab Results   Component Value Date/Time    TSH 1.08 03/19/2017 06:05 AM        Procedures/imaging: see electronic medical records for all procedures/Xrays and details which were not copied into this note but were reviewed prior to creation of Mary Carmen Veras MD

## 2017-03-24 NOTE — PROGRESS NOTES
2310:  Assumed care. Pt awake, alert and oriented. Pt resting in bed with no acute signs of distress. Call bell and telephone within reach. White board updated. 2335:  PT c/o waist pain 08/10. PRN Morphine administered,    0307:  Pt c/o pain to waist 08/10. PRN Morphine administered. .Shift Summary:  Pt had uneventful shift. Pt in bed resting with no signs of distress or c/o pain.

## 2017-03-24 NOTE — PROGRESS NOTES
Zosyn/ vanco ==> unasyn  Rec: 1. Abscess    Rt buttocls    S/p incision/ drainage    No mrsa isolated - alpha strep    Leukocytosis persists - slightly decreased      ==> local care    ==> change abx to unasyn    ==> could consider oral augmentin x 10 days on discharge    D/w patient at length    Subjective: \"I feel a little better\"    No n/v/d    No cough/sputum    No abd pain     No rash    PE:   Visit Vitals    /72 (BP 1 Location: Left arm, BP Patient Position: At rest)    Pulse 80    Temp 98.4 °F (36.9 °C)    Resp 16    Ht 5' 5\" (1.651 m)    Wt 73.4 kg (161 lb 14.4 oz)    SpO2 100%    BMI 26.94 kg/m2       Heent: O/c clear. No icterus. No thrush No ulcer. Neck: Supple   Chest: CTA Bilat No exp wheeze   CV: Nl s1s2 No murmurs   Abd: Soft NTND no reboun No masses   G/u Rt buttock drain in place. seropurlent discharge    No odor. No surrounding erythema/ induration   Ext:    Tr edema No rash    Lab:     Recent Results (from the past 24 hour(s))   VANCOMYCIN, TROUGH    Collection Time: 03/23/17  9:20 PM   Result Value Ref Range    Vancomycin,trough 13.6 10.0 - 20.0 ug/mL    Reported dose date: 20170322      Reported dose time: 2240      Reported dose: 750 MG UNITS   LD    Collection Time: 03/24/17  2:22 AM   Result Value Ref Range     81 - 234 U/L   CBC WITH AUTOMATED DIFF    Collection Time: 03/24/17  2:22 AM   Result Value Ref Range    WBC 21.2 (H) 4.6 - 13.2 K/uL    RBC 2.97 (L) 4.70 - 5.50 M/uL    HGB 8.5 (L) 13.0 - 16.0 g/dL    HCT 25.2 (L) 36.0 - 48.0 %    MCV 84.8 74.0 - 97.0 FL    MCH 28.6 24.0 - 34.0 PG    MCHC 33.7 31.0 - 37.0 g/dL    RDW 19.0 (H) 11.6 - 14.5 %    PLATELET 939 121 - 369 K/uL    MPV 10.2 9.2 - 11.8 FL    NEUTROPHILS 81 (H) 40 - 73 %    LYMPHOCYTES 10 (L) 21 - 52 %    MONOCYTES 7 3 - 10 %    EOSINOPHILS 2 0 - 5 %    BASOPHILS 0 0 - 2 %    ABS. NEUTROPHILS 17.0 (H) 1.8 - 8.0 K/UL    ABS. LYMPHOCYTES 2.1 0.9 - 3.6 K/UL    ABS. MONOCYTES 1.5 (H) 0.05 - 1.2 K/UL    ABS. EOSINOPHILS 0.5 (H) 0.0 - 0.4 K/UL    ABS.  BASOPHILS 0.0 0.0 - 0.06 K/UL    DF AUTOMATED     MAGNESIUM    Collection Time: 03/24/17  2:22 AM   Result Value Ref Range    Magnesium 2.1 1.8 - 2.4 mg/dL   METABOLIC PANEL, BASIC    Collection Time: 03/24/17  2:22 AM   Result Value Ref Range    Sodium 137 136 - 145 mmol/L    Potassium 5.3 3.5 - 5.5 mmol/L    Chloride 110 (H) 100 - 108 mmol/L    CO2 17 (L) 21 - 32 mmol/L    Anion gap 10 3.0 - 18 mmol/L    Glucose 100 (H) 74 - 99 mg/dL    BUN 25 (H) 7.0 - 18 MG/DL    Creatinine 2.40 (H) 0.6 - 1.3 MG/DL    BUN/Creatinine ratio 10 (L) 12 - 20      GFR est AA 34 (L) >60 ml/min/1.73m2    GFR est non-AA 28 (L) >60 ml/min/1.73m2    Calcium 8.1 (L) 8.5 - 10.1 MG/DL       Meds:     Current Facility-Administered Medications   Medication Dose Route Frequency Provider Last Rate Last Dose    vancomycin (VANCOCIN) 750 mg in 0.9% sodium chloride (MBP/ADV) 250 mL ADV  750 mg IntraVENous Q24H Natividad Salcedo  mL/hr at 03/23/17 2236 750 mg at 03/23/17 2236    piperacillin-tazobactam (ZOSYN) 2.25 g in 0.9% sodium chloride (MBP/ADV) 50 mL MBP  2.25 g IntraVENous Q6H Natividad Salcedo  mL/hr at 03/24/17 0900 2.25 g at 03/24/17 0900    0.9% sodium chloride infusion 250 mL  250 mL IntraVENous PRN Natividad Salcedo MD        ondansetron hcl [facility]) tablet 8 mg  8 mg Oral BID Kerrie Barrow MD   8 mg at 03/24/17 0901    prochlorperazine (COMPAZINE) tablet 10 mg  10 mg Oral QID Kerrie Barrow MD   10 mg at 03/24/17 1233    morphine injection 2 mg  2 mg IntraVENous Q3H PRN Lakisha López MD   2 mg at 03/24/17 1234    megestrol (MEGACE) 400 mg/10 mL (10 mL) oral suspension 200 mg  200 mg Oral DAILY Kerrie Barrow MD   200 mg at 03/24/17 1404    Vancomycin - Pharmacy to Dose  1 Each Other Rx Dosing/Monitoring Natividad Salcedo MD        acetaminophen (TYLENOL) tablet 650 mg  650 mg Oral Q6H PRN Lakisha López MD   650 mg at 03/21/17 5632    albuterol-ipratropium (DUO-NEB) 2.5 MG-0.5 MG/3 ML  3 mL Nebulization Q6H PRN Sonal Guillen MD   3 mL at 03/19/17 2026    amLODIPine (NORVASC) tablet 10 mg  10 mg Oral DAILY Sonal Guillen MD   10 mg at 03/24/17 0901    carvedilol (COREG) tablet 6.25 mg  6.25 mg Oral BID WITH MEALS Sonal Guillen MD   6.25 mg at 03/24/17 0901    cyanocobalamin (VITAMIN B12) tablet 100 mcg  100 mcg Oral DAILY Sonal Guillen MD   100 mcg at 03/24/17 0900    doxepin (SINEquan) capsule 50 mg  50 mg Oral QHS Sonal Guillen MD   50 mg at 64/98/00 9556    folic acid (FOLVITE) tablet 1 mg  1 mg Oral DAILY Sonal Guillen MD   1 mg at 03/24/17 0901    pyridoxine (vitamin B6) (VITAMIN B-6) tablet 100 mg  100 mg Oral DAILY Sonal Guillen MD   100 mg at 03/24/17 0901    traZODone (DESYREL) tablet 50 mg  50 mg Oral QHS PRN Sonal Guillen MD   50 mg at 03/18/17 2238    diphenhydrAMINE (BENADRYL) injection 12.5 mg  12.5 mg IntraVENous Q4H PRN Sonal Guillen MD        0.9% sodium chloride infusion 250 mL  250 mL IntraVENous PRN Sonal Guillen MD        sodium chloride (NS) flush 5-10 mL  5-10 mL IntraVENous Q8H Sonal Guillen MD   10 mL at 03/24/17 0600    sodium chloride (NS) flush 5-10 mL  5-10 mL IntraVENous PRN Sonal Guillen MD        LORazepam (ATIVAN) tablet 1 mg  1 mg Oral Q1H PRN Sonal Guillen MD        Or    LORazepam (ATIVAN) injection 1 mg  1 mg IntraVENous Q1H PRN Sonal Guillen MD        LORazepam (ATIVAN) tablet 2 mg  2 mg Oral Q1H PRN Sonal Guillen MD        Or    LORazepam (ATIVAN) injection 2 mg  2 mg IntraVENous Q1H PRN Sonal Guillen MD        LORazepam (ATIVAN) injection 3 mg  3 mg IntraVENous Q15MIN PRN Sonal Guillen MD        nicotine (NICODERM CQ) 21 mg/24 hr patch 1 Patch  1 Patch TransDERmal DAILY Sonal Guillen MD   1 Patch at 03/24/17 0900           Luh Crane  [phone](pg)

## 2017-03-24 NOTE — ROUTINE PROCESS
Bedside shift change report given to [address] [place] [address] Drive (oncoming nurse) by  [facility] (offgoing nurse). Report included the following information SBAR, Kardex and MAR.

## 2017-03-24 NOTE — ROUTINE PROCESS
Bedside and Verbal shift change report given to Atul Swan RN (oncoming nurse) by Juan Paula RN (offgoing nurse). Report included the following information SBAR, Kardex, Intake/Output, MAR, Recent Results and Cardiac Rhythm RN.

## 2017-03-25 LAB
ANION GAP BLD CALC-SCNC: 10 MMOL/L (ref 3–18)
BACTERIA SPEC CULT: NORMAL
BUN SERPL-MCNC: 29 MG/DL (ref 7–18)
BUN/CREAT SERPL: 12 (ref 12–20)
CALCIUM SERPL-MCNC: 8.3 MG/DL (ref 8.5–10.1)
CHLORIDE SERPL-SCNC: 108 MMOL/L (ref 100–108)
CO2 SERPL-SCNC: 19 MMOL/L (ref 21–32)
CREAT SERPL-MCNC: 2.39 MG/DL (ref 0.6–1.3)
GLUCOSE SERPL-MCNC: 128 MG/DL (ref 74–99)
LDH SERPL L TO P-CCNC: 183 U/L (ref 81–234)
MAGNESIUM SERPL-MCNC: 1.9 MG/DL (ref 1.8–2.4)
POTASSIUM SERPL-SCNC: 5.7 MMOL/L (ref 3.5–5.5)
SERVICE CMNT-IMP: NORMAL
SODIUM SERPL-SCNC: 137 MMOL/L (ref 136–145)

## 2017-03-25 PROCEDURE — 74011250636 HC RX REV CODE- 250/636: Performed by: FAMILY MEDICINE

## 2017-03-25 PROCEDURE — 80048 BASIC METABOLIC PNL TOTAL CA: CPT | Performed by: SURGERY

## 2017-03-25 PROCEDURE — 83735 ASSAY OF MAGNESIUM: CPT | Performed by: SURGERY

## 2017-03-25 PROCEDURE — 74011250636 HC RX REV CODE- 250/636: Performed by: INTERNAL MEDICINE

## 2017-03-25 PROCEDURE — 74011000258 HC RX REV CODE- 258: Performed by: INTERNAL MEDICINE

## 2017-03-25 PROCEDURE — 74011250637 HC RX REV CODE- 250/637: Performed by: INTERNAL MEDICINE

## 2017-03-25 PROCEDURE — 36415 COLL VENOUS BLD VENIPUNCTURE: CPT | Performed by: SURGERY

## 2017-03-25 PROCEDURE — 74011250637 HC RX REV CODE- 250/637: Performed by: FAMILY MEDICINE

## 2017-03-25 PROCEDURE — 83615 LACTATE (LD) (LDH) ENZYME: CPT | Performed by: SURGERY

## 2017-03-25 PROCEDURE — 97116 GAIT TRAINING THERAPY: CPT

## 2017-03-25 PROCEDURE — 65270000029 HC RM PRIVATE

## 2017-03-25 RX ORDER — HYDROMORPHONE HYDROCHLORIDE 2 MG/ML
1 INJECTION, SOLUTION INTRAMUSCULAR; INTRAVENOUS; SUBCUTANEOUS
Status: DISCONTINUED | OUTPATIENT
Start: 2017-03-25 | End: 2017-03-25

## 2017-03-25 RX ORDER — SODIUM POLYSTYRENE SULFONATE 15 G/60ML
15 SUSPENSION ORAL; RECTAL
Status: COMPLETED | OUTPATIENT
Start: 2017-03-25 | End: 2017-03-25

## 2017-03-25 RX ORDER — HYDROMORPHONE HYDROCHLORIDE 1 MG/ML
1 INJECTION, SOLUTION INTRAMUSCULAR; INTRAVENOUS; SUBCUTANEOUS
Status: DISCONTINUED | OUTPATIENT
Start: 2017-03-25 | End: 2017-03-29 | Stop reason: HOSPADM

## 2017-03-25 RX ADMIN — Medication 2 MG: at 13:34

## 2017-03-25 RX ADMIN — Medication 10 ML: at 17:04

## 2017-03-25 RX ADMIN — HYDROMORPHONE HYDROCHLORIDE 1 MG: 2 INJECTION, SOLUTION INTRAMUSCULAR; INTRAVENOUS; SUBCUTANEOUS at 23:00

## 2017-03-25 RX ADMIN — PROCHLORPERAZINE MALEATE 10 MG: 10 TABLET, FILM COATED ORAL at 18:09

## 2017-03-25 RX ADMIN — PROCHLORPERAZINE MALEATE 10 MG: 10 TABLET, FILM COATED ORAL at 22:13

## 2017-03-25 RX ADMIN — HYDROMORPHONE HYDROCHLORIDE 1 MG: 2 INJECTION INTRAMUSCULAR; INTRAVENOUS; SUBCUTANEOUS at 18:09

## 2017-03-25 RX ADMIN — SODIUM POLYSTYRENE SULFONATE 15 G: 15 SUSPENSION ORAL; RECTAL at 09:48

## 2017-03-25 RX ADMIN — HYDROMORPHONE HYDROCHLORIDE 1 MG: 2 INJECTION INTRAMUSCULAR; INTRAVENOUS; SUBCUTANEOUS at 15:58

## 2017-03-25 RX ADMIN — ACETAMINOPHEN 650 MG: 325 TABLET ORAL at 03:33

## 2017-03-25 RX ADMIN — MEGESTROL ACETATE 200 MG: 40 SUSPENSION ORAL at 09:56

## 2017-03-25 RX ADMIN — Medication 10 ML: at 22:00

## 2017-03-25 RX ADMIN — Medication 2 MG: at 09:52

## 2017-03-25 RX ADMIN — VITAM B12 100 MCG: 100 TAB at 09:46

## 2017-03-25 RX ADMIN — Medication 2 MG: at 03:33

## 2017-03-25 RX ADMIN — FOLIC ACID 1 MG: 1 TABLET ORAL at 09:46

## 2017-03-25 RX ADMIN — Medication 2 MG: at 00:52

## 2017-03-25 RX ADMIN — ONDANSETRON HYDROCHLORIDE 8 MG: 4 TABLET, FILM COATED ORAL at 22:13

## 2017-03-25 RX ADMIN — HYDROMORPHONE HYDROCHLORIDE 1 MG: 2 INJECTION INTRAMUSCULAR; INTRAVENOUS; SUBCUTANEOUS at 20:18

## 2017-03-25 RX ADMIN — CARVEDILOL 6.25 MG: 3.12 TABLET, FILM COATED ORAL at 09:46

## 2017-03-25 RX ADMIN — AMPICILLIN SODIUM AND SULBACTAM SODIUM 3 G: 2; 1 INJECTION, POWDER, FOR SOLUTION INTRAMUSCULAR; INTRAVENOUS at 05:40

## 2017-03-25 RX ADMIN — Medication 10 ML: at 05:40

## 2017-03-25 RX ADMIN — AMPICILLIN SODIUM AND SULBACTAM SODIUM 3 G: 2; 1 INJECTION, POWDER, FOR SOLUTION INTRAMUSCULAR; INTRAVENOUS at 22:57

## 2017-03-25 RX ADMIN — Medication 100 MG: at 09:46

## 2017-03-25 RX ADMIN — DOXEPIN HYDROCHLORIDE 50 MG: 50 CAPSULE ORAL at 22:13

## 2017-03-25 RX ADMIN — ONDANSETRON HYDROCHLORIDE 8 MG: 4 TABLET, FILM COATED ORAL at 09:46

## 2017-03-25 RX ADMIN — CARVEDILOL 6.25 MG: 3.12 TABLET, FILM COATED ORAL at 17:04

## 2017-03-25 RX ADMIN — AMPICILLIN SODIUM AND SULBACTAM SODIUM 3 G: 2; 1 INJECTION, POWDER, FOR SOLUTION INTRAMUSCULAR; INTRAVENOUS at 17:03

## 2017-03-25 RX ADMIN — Medication 2 MG: at 06:49

## 2017-03-25 RX ADMIN — PROCHLORPERAZINE MALEATE 10 MG: 10 TABLET, FILM COATED ORAL at 13:34

## 2017-03-25 RX ADMIN — AMLODIPINE BESYLATE 10 MG: 5 TABLET ORAL at 09:46

## 2017-03-25 RX ADMIN — PROCHLORPERAZINE MALEATE 10 MG: 10 TABLET, FILM COATED ORAL at 09:46

## 2017-03-25 NOTE — PROGRESS NOTES
21:35  Assessment completed. Lungs are clear bilat. Dsg on R hip remains C/D/I. Resting quietly in bed x for voiding per BR w/o difficulty. Ambulating in room & BR w/o difficulty. 23:05 Shift assessment completed. See nsg flow sheet for details. 03:10 Reassessed with 0 changes noted. Dsg on R hip remains C/D/I with + CMS & + PP. Resting quietly in bed x for voiding per BR w/o difficulty. 07:45 Bedside and Verbal shift change report given to Jesse Blakely RN (oncoming nurse) by Barby Fowler RN (offgoing nurse). Report included the following information SBAR.

## 2017-03-25 NOTE — PROGRESS NOTES
Hospitalist Progress Note    Patient: Severo Quarry MRN: [id number]  CSN: [id number]    YOB: 1960  Age: 55 y.o. 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 CKD 3 cr 2.39 (was 2.39 on 12/19/2016) stable   3 Sickle cell anemia with crisis. LDH wnl, resolved,   4. narcotic overdose  5 alcohol abuse; stable CIWA protocol discontinued. 6. soft tissue abscess rt buttock. Abx changed to Unasyn as per ID, WBC improving.  - I &d per Dr. Tarun Holliday , appreciated. Will continue local wound care ,   -wound cx: POSSIBLE STREPTOCOCCI, ALPHA HEMOLYTIC  As per Dr. Vonda Paige , no iv abx needed on d.c   7 hip pain; No fracture from xray, ct pelvic performed, no active joint infection noted, appreciated ortho on board   8 ,bilateral effusion; off nc O2 now. echo ordered, not performed, will reschedule if clinical sob   9 sepsis; Due to abscess, resolving. Wbc improving,   10 sickle cell anemia;  Received transfusion for procedure, resolved       Subjective:  No new complaints. Feeling better this morning. Review of systems:    Constitutional: denies fevers, chills, myalgias  Respiratory: denies SOB, cough  Cardiovascular: denies chest pain, palpitations  Gastrointestinal: denies nausea, vomiting, diarrhea      Vital signs/Intake and Output:  Visit Vitals    /79 (BP 1 Location: Left arm, BP Patient Position: At rest)    Pulse 85    Temp 98.4 °F (36.9 °C)    Resp 18    Ht 5' 5\" (1.651 m)    Wt 74.3 kg (163 lb 14.4 oz)    SpO2 100%    BMI 27.27 kg/m2     Current Shift:     Last three shifts:  03/23 1901 - 03/25 0700  In: 1630 [P.O.:1330; I.V.:300]  Out: 2700 [Urine:2700]    Exam:    General: Well developed, alert, NAD, OX3  Head/Neck: NCAT, supple, No masses, No lymphadenopathy  CVS:Regular rate and rhythm, no M/R/G, S1/S2 heard, no thrill  Lungs:Clear to auscultation bilaterally, no wheezes, rhonchi, or rales  Abdomen: Soft, Nontender, No distention, Normal Bowel sounds, No hepatomegaly  Extremities: No C/C/E, pulses palpable 2+  Skin:normal texture and turgor, no rashes, no lesions  Neuro:grossly normal , follows commands  Psych:appropriate                Labs: Results:       Chemistry Recent Labs      03/25/17   0525  03/24/17 0222 03/23/17 0336   GLU  128*  100*  111*   NA  137  137  138   K  5.7*  5.3  5.3   CL  108  110*  111*   CO2  19*  17*  20*   BUN  29*  25*  29*   CREA  2.39*  2.40*  2.58*   CA  8.3*  8.1*  8.0*   AGAP  10  10  7   BUCR  12  10*  11*      CBC w/Diff Recent Labs      03/24/17 0222 03/23/17   0336  03/22/17   1054   WBC  21.2*  22.3*  29.6*   RBC  2.97*  2.94*  2.92*   HGB  8.5*  8.5*  8.6*   HCT  25.2*  24.4*  24.6*   PLT  339  294  260   GRANS  81*  84*  79*   LYMPH  10*  8*  12*   EOS  2  3  4      Cardiac Enzymes No results for input(s): CPK, CKND1, STEFAN in the last 72 hours. No lab exists for component: CKRMB, TROIP   Coagulation No results for input(s): PTP, INR, APTT in the last 72 hours.     No lab exists for component: INREXT    Lipid Panel No results found for: CHOL, CHOLPOCT, CHOLX, CHLST, CHOLV, [id number], HDL, LDL, NLDLCT, DLDL, LDLC, DLDLP, 625999, VLDLC, VLDL, TGL, TGLX, TRIGL, [id number], TRIGP, TGLPOCT, [id number], CHHD, CHHDX   BNP No results for input(s): BNPP in the last 72 hours. Liver Enzymes No results for input(s): TP, ALB, TBIL, AP, SGOT, GPT in the last 72 hours.     No lab exists for component: DBIL   Thyroid Studies Lab Results   Component Value Date/Time    TSH 1.08 03/19/2017 06:05 AM        Procedures/imaging: see electronic medical records for all procedures/Xrays and details which were not copied into this note but were reviewed prior to creation of Kati Real MD

## 2017-03-25 NOTE — PROGRESS NOTES
Assumed care of patient at this time. Pt alert and oriented times 4. Patient resting in bed, no complaints of chest pain, SOB, numbness or tingling at this time. 22G RAC capped as ordered. 4X4 ABD pad dressing in right buttocks place CDI. Call bell, telephone, and personal belongings within reach. 1334 Pt ambulate in hallway, pt request pain medication PAIN medication given morphine 2 mg IV. Potential medication side effects explained to patient, patient verbalizes understanding, opportunities for questions provided. Patient stable, no apparent distress at this time, bed in locked position, call bell within reach. Pt stated, \"this medication don't help with my pain, it only work for a little while\"      [phone] with Dr. Luke Fishman to request pain medication for pt. Dr. Luke Fishman place order or Dilaudid 1 mg IV every 2 hrs as needed for pain.    1558 PAIN medication given. Potential medication side effects explained to patient, patient verbalizes understanding, opportunities for questions provided. Patient stable, no apparent distress at this time, bed in locked position, call bell within reach. Pain 8/10    1630 Dressing changed, 4x4 with ABD pad and tegraderm tape. Pt tolerated well.     1809PAIN medication given. Potential medication side effects explained to patient, patient verbalizes understanding, opportunities for questions provided. Patient stable, no apparent distress at this time, bed in locked position, call bell within reach. Pain 7/10     Shift summary: Patient had uneventful shift. Patient up ad lid. Pain remained controlled with medication.  No issues/concerns at this time

## 2017-03-25 NOTE — PROGRESS NOTES
unasyn  Rec: 1. Abscess    Rt buttocls    S/p incision/ drainage    No mrsa isolated - alpha strep    Leukocytosis persists - slightly decreased      ==> local care    ==> changed abx to unasyn    ==> could consider oral augmentin x 10 days on discharge    D/w patient at length    Subjective: \"I feel a little better - I alejandra too much back there\"    No n/v/d    No cough/sputum    No abd pain     No rash    PE:   Visit Vitals    /79 (BP 1 Location: Left arm, BP Patient Position: At rest)    Pulse 87    Temp 98.6 °F (37 °C)    Resp 18    Ht 5' 5\" (1.651 m)    Wt 74.3 kg (163 lb 14.4 oz)    SpO2 100%    BMI 27.27 kg/m2       Heent: O/c clear. No icterus. No thrush No ulcer. Neck: Supple   Chest: CTA Bilat No exp wheeze   CV: Nl s1s2 No murmurs   Abd: Soft NTND no reboun No masses   G/u Rt buttock drain in place. seropurlent discharge    No odor.  No surrounding erythema/ induration   Ext:    Tr edema No rash    Lab:     Recent Results (from the past 24 hour(s))   LD    Collection Time: 03/25/17  5:25 AM   Result Value Ref Range     81 - 234 U/L   MAGNESIUM    Collection Time: 03/25/17  5:25 AM   Result Value Ref Range    Magnesium 1.9 1.8 - 2.4 mg/dL   METABOLIC PANEL, BASIC    Collection Time: 03/25/17  5:25 AM   Result Value Ref Range    Sodium 137 136 - 145 mmol/L    Potassium 5.7 (H) 3.5 - 5.5 mmol/L    Chloride 108 100 - 108 mmol/L    CO2 19 (L) 21 - 32 mmol/L    Anion gap 10 3.0 - 18 mmol/L    Glucose 128 (H) 74 - 99 mg/dL    BUN 29 (H) 7.0 - 18 MG/DL    Creatinine 2.39 (H) 0.6 - 1.3 MG/DL    BUN/Creatinine ratio 12 12 - 20      GFR est AA 34 (L) >60 ml/min/1.73m2    GFR est non-AA 28 (L) >60 ml/min/1.73m2    Calcium 8.3 (L) 8.5 - 10.1 MG/DL       Meds:     Current Facility-Administered Medications   Medication Dose Route Frequency Provider Last Rate Last Dose    HYDROmorphone (DILAUDID) injection 1 mg  1 mg IntraVENous Q2H PRN Jeferson Alejo MD   1 mg at 03/25/17 0337    ampicillin-sulbactam (UNASYN) 3 g in 0.9% sodium chloride (MBP/ADV) 100 mL MBP  3 g IntraVENous Q8H Jasvir Lopez  mL/hr at 03/25/17 1703 3 g at 03/25/17 1703    0.9% sodium chloride infusion 250 mL  250 mL IntraVENous PRN Chelsea Bridges MD        ondansetron hcl [facility]) tablet 8 mg  8 mg Oral BID Odessa Gonzalez MD   8 mg at 03/25/17 0946    prochlorperazine (COMPAZINE) tablet 10 mg  10 mg Oral QID Odessa Gonzalez MD   10 mg at 03/25/17 1809    megestrol (MEGACE) 400 mg/10 mL (10 mL) oral suspension 200 mg  200 mg Oral DAILY Odessa Gonzalez MD   200 mg at 03/25/17 0956    acetaminophen (TYLENOL) tablet 650 mg  650 mg Oral Q6H PRN Fritz Castellanos MD   650 mg at 03/25/17 0333    albuterol-ipratropium (DUO-NEB) 2.5 MG-0.5 MG/3 ML  3 mL Nebulization Q6H PRN Fritz Castellanos MD   3 mL at 03/19/17 2026    amLODIPine (NORVASC) tablet 10 mg  10 mg Oral DAILY Fritz Castellanos MD   10 mg at 03/25/17 0946    carvedilol (COREG) tablet 6.25 mg  6.25 mg Oral BID WITH MEALS Fritz Castellanos MD   6.25 mg at 03/25/17 1704    cyanocobalamin (VITAMIN B12) tablet 100 mcg  100 mcg Oral DAILY Fritz Castellanos MD   100 mcg at 03/25/17 0946    doxepin (SINEquan) capsule 50 mg  50 mg Oral QHS Fritz Castellanos MD   50 mg at 28/84/25 3487    folic acid (FOLVITE) tablet 1 mg  1 mg Oral DAILY Fritz Castellanos MD   1 mg at 03/25/17 0946    pyridoxine (vitamin B6) (VITAMIN B-6) tablet 100 mg  100 mg Oral DAILY Fritz Castellanos MD   100 mg at 03/25/17 0946    traZODone (DESYREL) tablet 50 mg  50 mg Oral QHS PRN Fritz Castellanos MD   50 mg at 03/18/17 2258    diphenhydrAMINE (BENADRYL) injection 12.5 mg  12.5 mg IntraVENous Q4H PRN Fritz Castellanos MD        0.9% sodium chloride infusion 250 mL  250 mL IntraVENous PRN Fritz Castellanos MD        sodium chloride (NS) flush 5-10 mL  5-10 mL IntraVENous Q8H Fritz Castellanos MD   10 mL at 03/25/17 1704    sodium chloride (NS) flush 5-10 mL  5-10 mL IntraVENous PRN Fritz Castellanos MD        LORazepam (ATIVAN) tablet 1 mg  1 mg Oral Q1H PRN Maurilio Snyder MD        Or    LORazepam (ATIVAN) injection 1 mg  1 mg IntraVENous Q1H PRN Maurilio Snyder MD        LORazepam (ATIVAN) tablet 2 mg  2 mg Oral Q1H PRN Maurilio Snyder MD        Or    LORazepam (ATIVAN) injection 2 mg  2 mg IntraVENous Q1H PRN Maurilio Snyder MD        LORazepam (ATIVAN) injection 3 mg  3 mg IntraVENous Q15MIN PRN Maurilio Snyder MD        nicotine (NICODERM CQ) 21 mg/24 hr patch 1 Patch  1 Patch TransDERmal DAILY Maurilio Snyder MD   1 Patch at 03/25/17 [address]  889.8071(pg)

## 2017-03-25 NOTE — PROGRESS NOTES
Problem: Mobility Impaired (Adult and Pediatric)  Goal: *Acute Goals and Plan of Care (Insert Text)  Physical Therapy Goals  Initiated 3/21/2017 and to be accomplished within 5 day(s)  1. Patient will move from supine <> sit with mod I in prep for out of bed activity and change of position. 2. Patient will perform sit<> stand with mod I with LRAD in prep for transfers/ambulation. 3. Patient will transfer from bed <> chair with mod I with LRAD for time up in chair for completion of ADL activity. 4. Patient will ambulate 150 feet with LRAD for increase functional mobility at discharge. 5. Patient will ascend/descend 15 stairs with handrail(s) with mod I assist for home re-entry as needed. Outcome: Progressing Towards Goal  PHYSICAL THERAPY TREATMENT/DISCHARGE     Patient: Beverley Pritchard (3 y.o. male)  Date: 3/25/2017  Diagnosis: Acute hyperkalemia  PERIANAL ABSCESS Acute hyperkalemia  Procedure(s) (LRB):  Incision and drainage of right buttock abcess (Right) 4 Days Post-Op  Precautions: Fall  Chart, physical therapy assessment, plan of care and goals were reviewed. ASSESSMENT:  Pt meets needs for safe home mobility and is cleared from this level of PT. Pt would benefit from Rolling Walker to improve safety with longer distance ambulation. Pt notes having no AD for use at D/c. Progression toward goals:  [X]      Goals met  [ ]      Improving appropriately and progressing toward goals  [ ]      Improving slowly and progressing toward goals  [ ]      Not making progress toward goals and plan of care will be adjusted       PLAN:  Patient will be discharged from physical therapy at this time. Rationale for discharge:  [X] Goals Achieved  [ ] [address]  [ ] Patient not participating in therapy  [ ] Other:  Discharge Recommendations:  Home Health  Further Equipment Recommendations for Discharge:  rolling walker       SUBJECTIVE:   Patient stated Walton Sharper man. I'm ok.       OBJECTIVE DATA SUMMARY:   Critical Behavior:  Neurologic State: Alert, Eyes open spontaneously  Orientation Level: Oriented X4  Cognition: Appropriate decision making, Appropriate for age attention/concentration, Appropriate safety awareness, Follows commands  Safety/Judgement: Fall prevention  Functional Mobility Training:  Bed Mobility:  Rolling: Supervision  Supine to Sit: Supervision  Transfers:  Sit to Stand: Supervision  Stand to Sit: Supervision  Balance:  Sitting: Intact  Standing: Intact  Ambulation/Gait Training:  Distance (ft): 400 Feet (ft)  Assistive Device: Gait belt  Ambulation - Level of Assistance: Supervision  Gait Abnormalities: Decreased step clearance  Right Side Weight Bearing: As tolerated  Base of Support: Shift to left  Stance: Right decreased  Speed/Dorcas: Slow  Step Length: Right shortened;Left shortened  Swing Pattern: Right asymmetrical  Interventions: Verbal cues  Stairs:  Number of Stairs Trained: 15  Stairs - Level of Assistance: Supervision              Rail Use: Right   Pain:  Pain Scale 1: Numeric (0 - 10)  Pain Intensity 1: 8  Pain Location 1: Shoulder  Pain Orientation 1: Right  Pain Description 1: Aching  Pain Intervention(s) 1: Medication (see MAR)  Activity Tolerance:   Good  Please refer to the flowsheet for vital signs taken during this treatment.   After treatment:   [ ] Patient left in no apparent distress sitting up in chair  [X] Patient left in no apparent distress in bed  [X] Call bell left within reach  [X] Nursing notified  [ ] Caregiver present  [ ] Bed alarm activated  Adonis Zimmer PTA   Time Calculation: 15 mins
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
## 2017-03-26 LAB
ANION GAP BLD CALC-SCNC: 8 MMOL/L (ref 3–18)
BACTERIA SPEC CULT: ABNORMAL
BACTERIA SPEC CULT: ABNORMAL
BASOPHILS # BLD AUTO: 0.1 K/UL (ref 0–0.06)
BASOPHILS # BLD: 0 % (ref 0–2)
BUN SERPL-MCNC: 27 MG/DL (ref 7–18)
BUN/CREAT SERPL: 11 (ref 12–20)
CALCIUM SERPL-MCNC: 8.2 MG/DL (ref 8.5–10.1)
CHLORIDE SERPL-SCNC: 110 MMOL/L (ref 100–108)
CO2 SERPL-SCNC: 21 MMOL/L (ref 21–32)
CREAT SERPL-MCNC: 2.38 MG/DL (ref 0.6–1.3)
DIFFERENTIAL METHOD BLD: ABNORMAL
EOSINOPHIL # BLD: 0.6 K/UL (ref 0–0.4)
EOSINOPHIL NFR BLD: 3 % (ref 0–5)
ERYTHROCYTE [DISTWIDTH] IN BLOOD BY AUTOMATED COUNT: 19.5 % (ref 11.6–14.5)
GLUCOSE SERPL-MCNC: 96 MG/DL (ref 74–99)
GRAM STN SPEC: ABNORMAL
HCT VFR BLD AUTO: 24.8 % (ref 36–48)
HGB BLD-MCNC: 8.2 G/DL (ref 13–16)
LDH SERPL L TO P-CCNC: 151 U/L (ref 81–234)
LYMPHOCYTES # BLD AUTO: 13 % (ref 21–52)
LYMPHOCYTES # BLD: 2.4 K/UL (ref 0.9–3.6)
MAGNESIUM SERPL-MCNC: 1.8 MG/DL (ref 1.8–2.4)
MCH RBC QN AUTO: 28.6 PG (ref 24–34)
MCHC RBC AUTO-ENTMCNC: 33.1 G/DL (ref 31–37)
MCV RBC AUTO: 86.4 FL (ref 74–97)
MONOCYTES # BLD: 1.7 K/UL (ref 0.05–1.2)
MONOCYTES NFR BLD AUTO: 9 % (ref 3–10)
NEUTS SEG # BLD: 14.3 K/UL (ref 1.8–8)
NEUTS SEG NFR BLD AUTO: 75 % (ref 40–73)
PLATELET # BLD AUTO: 361 K/UL (ref 135–420)
PMV BLD AUTO: 9.8 FL (ref 9.2–11.8)
POTASSIUM SERPL-SCNC: 5.9 MMOL/L (ref 3.5–5.5)
RBC # BLD AUTO: 2.87 M/UL (ref 4.7–5.5)
SERVICE CMNT-IMP: ABNORMAL
SODIUM SERPL-SCNC: 139 MMOL/L (ref 136–145)
WBC # BLD AUTO: 18.9 K/UL (ref 4.6–13.2)

## 2017-03-26 PROCEDURE — 74011250636 HC RX REV CODE- 250/636: Performed by: INTERNAL MEDICINE

## 2017-03-26 PROCEDURE — 74011250637 HC RX REV CODE- 250/637: Performed by: INTERNAL MEDICINE

## 2017-03-26 PROCEDURE — 65270000029 HC RM PRIVATE

## 2017-03-26 PROCEDURE — 74011250637 HC RX REV CODE- 250/637: Performed by: FAMILY MEDICINE

## 2017-03-26 PROCEDURE — 83735 ASSAY OF MAGNESIUM: CPT | Performed by: SURGERY

## 2017-03-26 PROCEDURE — 85025 COMPLETE CBC W/AUTO DIFF WBC: CPT | Performed by: SURGERY

## 2017-03-26 PROCEDURE — 80048 BASIC METABOLIC PNL TOTAL CA: CPT | Performed by: SURGERY

## 2017-03-26 PROCEDURE — 36415 COLL VENOUS BLD VENIPUNCTURE: CPT | Performed by: SURGERY

## 2017-03-26 PROCEDURE — 74011000258 HC RX REV CODE- 258: Performed by: INTERNAL MEDICINE

## 2017-03-26 PROCEDURE — 74011250636 HC RX REV CODE- 250/636: Performed by: FAMILY MEDICINE

## 2017-03-26 PROCEDURE — 83615 LACTATE (LD) (LDH) ENZYME: CPT | Performed by: SURGERY

## 2017-03-26 RX ADMIN — ONDANSETRON HYDROCHLORIDE 8 MG: 4 TABLET, FILM COATED ORAL at 20:37

## 2017-03-26 RX ADMIN — TRAZODONE HYDROCHLORIDE 50 MG: 50 TABLET ORAL at 22:39

## 2017-03-26 RX ADMIN — DOXEPIN HYDROCHLORIDE 50 MG: 50 CAPSULE ORAL at 22:39

## 2017-03-26 RX ADMIN — CARVEDILOL 6.25 MG: 3.12 TABLET, FILM COATED ORAL at 11:00

## 2017-03-26 RX ADMIN — PROCHLORPERAZINE MALEATE 10 MG: 10 TABLET, FILM COATED ORAL at 18:22

## 2017-03-26 RX ADMIN — FOLIC ACID 1 MG: 1 TABLET ORAL at 11:00

## 2017-03-26 RX ADMIN — AMPICILLIN SODIUM AND SULBACTAM SODIUM 3 G: 2; 1 INJECTION, POWDER, FOR SOLUTION INTRAMUSCULAR; INTRAVENOUS at 22:38

## 2017-03-26 RX ADMIN — HYDROMORPHONE HYDROCHLORIDE 1 MG: 1 INJECTION, SOLUTION INTRAMUSCULAR; INTRAVENOUS; SUBCUTANEOUS at 20:37

## 2017-03-26 RX ADMIN — HYDROMORPHONE HYDROCHLORIDE 1 MG: 1 INJECTION, SOLUTION INTRAMUSCULAR; INTRAVENOUS; SUBCUTANEOUS at 05:58

## 2017-03-26 RX ADMIN — ONDANSETRON HYDROCHLORIDE 8 MG: 4 TABLET, FILM COATED ORAL at 11:00

## 2017-03-26 RX ADMIN — HYDROMORPHONE HYDROCHLORIDE 1 MG: 1 INJECTION, SOLUTION INTRAMUSCULAR; INTRAVENOUS; SUBCUTANEOUS at 08:15

## 2017-03-26 RX ADMIN — PROCHLORPERAZINE MALEATE 10 MG: 10 TABLET, FILM COATED ORAL at 22:39

## 2017-03-26 RX ADMIN — PROCHLORPERAZINE MALEATE 10 MG: 10 TABLET, FILM COATED ORAL at 14:08

## 2017-03-26 RX ADMIN — Medication 10 ML: at 22:39

## 2017-03-26 RX ADMIN — AMLODIPINE BESYLATE 10 MG: 5 TABLET ORAL at 11:00

## 2017-03-26 RX ADMIN — Medication 10 ML: at 06:00

## 2017-03-26 RX ADMIN — Medication 100 MG: at 11:00

## 2017-03-26 RX ADMIN — HYDROMORPHONE HYDROCHLORIDE 1 MG: 1 INJECTION, SOLUTION INTRAMUSCULAR; INTRAVENOUS; SUBCUTANEOUS at 14:10

## 2017-03-26 RX ADMIN — HYDROMORPHONE HYDROCHLORIDE 1 MG: 1 INJECTION, SOLUTION INTRAMUSCULAR; INTRAVENOUS; SUBCUTANEOUS at 18:22

## 2017-03-26 RX ADMIN — AMPICILLIN SODIUM AND SULBACTAM SODIUM 3 G: 2; 1 INJECTION, POWDER, FOR SOLUTION INTRAMUSCULAR; INTRAVENOUS at 05:58

## 2017-03-26 RX ADMIN — AMPICILLIN SODIUM AND SULBACTAM SODIUM 3 G: 2; 1 INJECTION, POWDER, FOR SOLUTION INTRAMUSCULAR; INTRAVENOUS at 14:08

## 2017-03-26 RX ADMIN — HYDROMORPHONE HYDROCHLORIDE 1 MG: 1 INJECTION, SOLUTION INTRAMUSCULAR; INTRAVENOUS; SUBCUTANEOUS at 22:42

## 2017-03-26 RX ADMIN — PROCHLORPERAZINE MALEATE 10 MG: 10 TABLET, FILM COATED ORAL at 11:00

## 2017-03-26 RX ADMIN — CARVEDILOL 6.25 MG: 3.12 TABLET, FILM COATED ORAL at 18:22

## 2017-03-26 NOTE — ROUTINE PROCESS
Bedside and Verbal shift change report given to Nate Denny RN (oncoming nurse) by Soo Blake RN   (offgoing nurse). Report included the following information SBAR, Kardex, MAR and Recent Results.

## 2017-03-26 NOTE — PROGRESS NOTES
Shift progress note:  Assumed care of patient from [facility], complains of pain with medication given x3. No s/s of acute distress, call bell within reach.

## 2017-03-26 NOTE — PROGRESS NOTES
unasyn  Rec: 1. Abscess    Rt buttocls    S/p incision/ drainage    No mrsa isolated - alpha strep    Leukocytosis persists - slightly decreased      ==> local care    ==> changed abx to unasyn    ==> could consider oral augmentin x 10 days on discharge    D/w patient at length    Subjective: \"I feel a little better - less drainage\"    No n/v/d    No cough/sputum    No abd pain     No rash    PE:   Visit Vitals    /84    Pulse 89    Temp 98.4 °F (36.9 °C)    Resp 18    Ht 5' 5\" (1.651 m)    Wt 72.6 kg (160 lb 1.6 oz)    SpO2 100%    BMI 26.64 kg/m2       Heent: O/c clear. No icterus. No thrush No ulcer. Neck: Supple   Chest: CTA Bilat No exp wheeze   CV: Nl s1s2 No murmurs   Abd: Soft NTND no reboun No masses   G/u Rt buttock drain in place. seropurlent discharge    No odor.  No surrounding erythema/ induration   Ext:    Tr edema No rash    Lab:     Recent Results (from the past 24 hour(s))   LD    Collection Time: 03/26/17  5:44 AM   Result Value Ref Range     81 - 234 U/L   MAGNESIUM    Collection Time: 03/26/17  5:44 AM   Result Value Ref Range    Magnesium 1.8 1.8 - 2.4 mg/dL   METABOLIC PANEL, BASIC    Collection Time: 03/26/17  5:44 AM   Result Value Ref Range    Sodium 139 136 - 145 mmol/L    Potassium 5.9 (H) 3.5 - 5.5 mmol/L    Chloride 110 (H) 100 - 108 mmol/L    CO2 21 21 - 32 mmol/L    Anion gap 8 3.0 - 18 mmol/L    Glucose 96 74 - 99 mg/dL    BUN 27 (H) 7.0 - 18 MG/DL    Creatinine 2.38 (H) 0.6 - 1.3 MG/DL    BUN/Creatinine ratio 11 (L) 12 - 20      GFR est AA 35 (L) >60 ml/min/1.73m2    GFR est non-AA 29 (L) >60 ml/min/1.73m2    Calcium 8.2 (L) 8.5 - 10.1 MG/DL   CBC WITH AUTOMATED DIFF    Collection Time: 03/26/17  5:44 AM   Result Value Ref Range    WBC 18.9 (H) 4.6 - 13.2 K/uL    RBC 2.87 (L) 4.70 - 5.50 M/uL    HGB 8.2 (L) 13.0 - 16.0 g/dL    HCT 24.8 (L) 36.0 - 48.0 %    MCV 86.4 74.0 - 97.0 FL    MCH 28.6 24.0 - 34.0 PG    MCHC 33.1 31.0 - 37.0 g/dL    RDW 19.5 (H) 11.6 - 14.5 %    PLATELET 890 620 - 973 K/uL    MPV 9.8 9.2 - 11.8 FL    NEUTROPHILS 75 (H) 40 - 73 %    LYMPHOCYTES 13 (L) 21 - 52 %    MONOCYTES 9 3 - 10 %    EOSINOPHILS 3 0 - 5 %    BASOPHILS 0 0 - 2 %    ABS. NEUTROPHILS 14.3 (H) 1.8 - 8.0 K/UL    ABS. LYMPHOCYTES 2.4 0.9 - 3.6 K/UL    ABS. MONOCYTES 1.7 (H) 0.05 - 1.2 K/UL    ABS. EOSINOPHILS 0.6 (H) 0.0 - 0.4 K/UL    ABS.  BASOPHILS 0.1 (H) 0.0 - 0.06 K/UL    DF AUTOMATED         Meds:     Current Facility-Administered Medications   Medication Dose Route Frequency Provider Last Rate Last Dose    HYDROmorphone (PF) (DILAUDID) injection 1 mg  1 mg IntraVENous Q2H PRN Nate Kirkpatrick MD   1 mg at 03/26/17 1410    ampicillin-sulbactam (UNASYN) 3 g in 0.9% sodium chloride (MBP/ADV) 100 mL MBP  3 g IntraVENous Q8H Ainsley Regalado  mL/hr at 03/26/17 1408 3 g at 03/26/17 1408    0.9% sodium chloride infusion 250 mL  250 mL IntraVENous PRN Roger Cehek MD        ondansetron hcl [facility]) tablet 8 mg  8 mg Oral BID Kristian Tamez MD   8 mg at 03/26/17 1100    prochlorperazine (COMPAZINE) tablet 10 mg  10 mg Oral QID Kristian Tamez MD   10 mg at 03/26/17 1408    megestrol (MEGACE) 400 mg/10 mL (10 mL) oral suspension 200 mg  200 mg Oral DAILY Kristian Tamez MD   200 mg at 03/25/17 0956    acetaminophen (TYLENOL) tablet 650 mg  650 mg Oral Q6H PRN Nate Kirkpatrick MD   650 mg at 03/25/17 0333    albuterol-ipratropium (DUO-NEB) 2.5 MG-0.5 MG/3 ML  3 mL Nebulization Q6H PRN Nate Kirkpatrick MD   3 mL at 03/19/17 2026    amLODIPine (NORVASC) tablet 10 mg  10 mg Oral DAILY Nate Kirkpatrick MD   10 mg at 03/26/17 1100    carvedilol (COREG) tablet 6.25 mg  6.25 mg Oral BID WITH MEALS Nate Kirkpatrick MD   6.25 mg at 03/26/17 1100    cyanocobalamin (VITAMIN B12) tablet 100 mcg  100 mcg Oral DAILY Nate Kirkpatrick MD   100 mcg at 03/25/17 0946    doxepin (SINEquan) capsule 50 mg  50 mg Oral QHS Nate Kirkpatrick MD   50 mg at 64/31/56 2406    folic acid (FOLVITE) tablet 1 mg  1 mg Oral DAILY Chas Pink MD   1 mg at 03/26/17 1100    pyridoxine (vitamin B6) (VITAMIN B-6) tablet 100 mg  100 mg Oral DAILY Chas Pink MD   100 mg at 03/26/17 1100    traZODone (DESYREL) tablet 50 mg  50 mg Oral QHS PRN Chas Pink MD   50 mg at 03/18/17 2238    diphenhydrAMINE (BENADRYL) injection 12.5 mg  12.5 mg IntraVENous Q4H PRN Chas Pink MD        0.9% sodium chloride infusion 250 mL  250 mL IntraVENous PRN Chas Pink MD        sodium chloride (NS) flush 5-10 mL  5-10 mL IntraVENous Q8H Chas Pink MD   10 mL at 03/26/17 0600    sodium chloride (NS) flush 5-10 mL  5-10 mL IntraVENous PRN Chas Pink MD        LORazepam (ATIVAN) tablet 1 mg  1 mg Oral Q1H PRN Chas Pink MD        Or    LORazepam (ATIVAN) injection 1 mg  1 mg IntraVENous Q1H PRN Chas Pink MD        LORazepam (ATIVAN) tablet 2 mg  2 mg Oral Q1H PRN Chas Pink MD        Or    LORazepam (ATIVAN) injection 2 mg  2 mg IntraVENous Q1H PRN Chas Pink MD        LORazepam (ATIVAN) injection 3 mg  3 mg IntraVENous Q15MIN PRN Chas Pink MD        nicotine (NICODERM CQ) 21 mg/24 hr patch 1 Patch  1 Patch TransDERmal DAILY Chas Pink MD   1 Patch at 03/26/17 [address]  [phone](pg)

## 2017-03-26 NOTE — ROUTINE PROCESS
Bedside and Verbal shift change report given to Felisha Bradford (oncoming nurse) by Sally Leventhal, RN (offgoing nurse). Report included the following information SBAR, Kardex, Intake/Output, MAR, Recent Results and Cardiac Rhythm sr.
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
## 2017-03-26 NOTE — PROGRESS NOTES
1942:  Assumed care. Pt awake, alert and oriented. Pt resting in bed with no acute signs of distress. Call bell and telephone within reach. White board updated. 2018:  Pt c/o pain 08/10 to left arm and waist.  RACHAELN Giovanni admistered    Shift Summary:  Pt had uneventful shift. Pt in bed resting with no signs of distress or c/o pain.

## 2017-03-26 NOTE — PROGRESS NOTES
Bedside and Verbal shift change report given to BERTIN Kiser RN (oncoming nurse) by Jean Suárez RN (offgoing nurse). Report included the following information SBAR, Kardex, Intake/Output, MAR and Recent Results.

## 2017-03-26 NOTE — PROGRESS NOTES
1000 - Patient sitting on side of the bed at this time. A/O x 4. IV to right ac intact and patent. SCDs ordered bilaterally. 4x4, ABD pad, tegaderm dressing to right buttock CDI. Denies numbness/tingling. Pedal pulses palpable. Lungs CTA. Bowel sounds active to all quadrants. Pain 4/10.      1410 - Pain 7/10. PRN dilaudid pain medication administered at this time. Patient has been educated on side effects. Side effect education sheets have been provided. 1822 - Pain 5/10. PRN dilaudid pain medication administered at this time. Patient has been educated on side effects. Side effect education sheets have been provided.

## 2017-03-26 NOTE — PROGRESS NOTES
Hospitalist Progress Note    Patient: Lobo Ware MRN: [id number]  [facility]: [id number]    YOB: 1960  Age: 55 y.o. Sex: male    DOA: 3/16/2017 LOS:  LOS: 9 days              IMPRESSION and Plan:    Lobo Ware is a 55 y.o. 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 ([address] 10/26/2015    MDD (major depressive disorder) 10/26/2015    Suicidal ideation 10/22/2015    Dislocation of hip joint prosthesis ([address] 11/06/2014    Avascular necrosis of bone of right hip ([address] 11/04/2014    Sickle cell anemia ([address] 11/04/2014    ETOH abuse 11/04/2014    Chronic hepatitis C ([address] 11/04/2014    Avascular necrosis of hip ([address] 11/04/2014     Principal Problem:    Acute hyperkalemia (3/16/2017)    Active Problems:    Sickle cell anemia ([address] (11/4/2014)      Sickle cell crisis ([address] (11/20/2016)      EDGAR (acute kidney injury) ([address] (11/20/2016)      Narcotic overdose (3/17/2017)      Hyponatremia (3/17/2017)      Abscess of buttock, right (3/20/2017)      Pain of right hip joint (3/20/2017)      Bilateral pleural effusion (3/20/2017)      CKD (chronic kidney disease) stage 3, GFR 30-59 ml/min (3/21/2017)        1) R buttock absscess / sepsis --  Wbc improving. Change abx to po augmentin from tomorrow    2) CKD with RAVINDRA - cr is improvign    3) sicnele cell anemia - appriciate hematology input    4)  FEN - labs reviewed and as ordered    5) Dispo - OOb today.  Plan for dc tomorrow if stable    Patient's condition is         Recommend to continue hospitalization. Discussed with patient. Chief Complaints:   Chief Complaint   Patient presents with    Altered mental status    Sickle Cell Crisis     SUBJECTIVE:  Pt is seen and examined. Chart reviewed. Feels better but still weak. today and still co buttock and abd pain but 'not as bad\"    No CP or SOB  No Fever, chills, Nausea, vomitting. Review of systems:    General: No fevers or chills. Cardiovascular: No chest pain or pressure. No palpitations. Pulmonary: shortness of breath. Gastrointestinal: No nausea, vomiting    PE:  Patient Vitals for the past 24 hrs:   BP Temp Pulse Resp SpO2 Weight   03/26/17 0750 165/79 98.2 °F (36.8 °C) 85 18 100 % -   03/26/17 0330 151/82 98.4 °F (36.9 °C) 86 18 100 % -   03/25/17 2300 156/82 98.4 °F (36.9 °C) 85 18 100 % 72.6 kg (160 lb 1.6 oz)   03/25/17 1945 152/77 98.5 °F (36.9 °C) 90 18 100 % -   03/25/17 1603 162/79 98.6 °F (37 °C) 87 18 100 % -   03/25/17 1056 155/85 98.4 °F (36.9 °C) 85 18 100 % -       Intake/Output Summary (Last 24 hours) at 03/26/17 0957  Last data filed at 03/26/17 0917   Gross per 24 hour   Intake              840 ml   Output              615 ml   Net              225 ml     Patient Vitals for the past 120 hrs:   Weight   03/22/17 0300 68.5 kg (151 lb 0.2 oz)   03/23/17 0300 72.9 kg (160 lb 11.5 oz)   03/23/17 2341 73.1 kg (161 lb 3.2 oz)   03/24/17 0344 73.4 kg (161 lb 14.4 oz)   03/25/17 0336 74.3 kg (163 lb 14.4 oz)   03/25/17 2300 72.6 kg (160 lb 1.6 oz)           HEENT: Perrla, EOMI; oral mucosa well prefused; Conjunctiva not injected  Neck: No JVD, Negative carotid bruits, normal pulses; No thyromegaly  Resp: CTA bilaterally; No wheezes or rales  CV: RRR s1s2 No murmur; No rubs; PMI not displaced  Abd: Positive Bowel Sounds, Soft, Nontender  Ext: No clubbing; No cyanosis;  No edema  Neuro: Alert and oriented; Nonfocal    Intake and Output:  Current Shift:  03/26 1933 - 03/26 1900  In: 120 [P.O.:120]  Out: -   Last three shifts:  03/24 1901 - 03/26 0700  In: 1200 [P.O.:1200]  Out: 1215 [Urine:1215]    Lab/Data Reviewed:  Recent Results (from the past 8 hour(s))   LD    Collection Time: 03/26/17  5:44 AM   Result Value Ref Range     81 - 234 U/L   MAGNESIUM    Collection Time: 03/26/17  5:44 AM   Result Value Ref Range    Magnesium 1.8 1.8 - 2.4 mg/dL   METABOLIC PANEL, BASIC    Collection Time: 03/26/17  5:44 AM   Result Value Ref Range    Sodium 139 136 - 145 mmol/L    Potassium 5.9 (H) 3.5 - 5.5 mmol/L    Chloride 110 (H) 100 - 108 mmol/L    CO2 21 21 - 32 mmol/L    Anion gap 8 3.0 - 18 mmol/L    Glucose 96 74 - 99 mg/dL    BUN 27 (H) 7.0 - 18 MG/DL    Creatinine 2.38 (H) 0.6 - 1.3 MG/DL    BUN/Creatinine ratio 11 (L) 12 - 20      GFR est AA 35 (L) >60 ml/min/1.73m2    GFR est non-AA 29 (L) >60 ml/min/1.73m2    Calcium 8.2 (L) 8.5 - 10.1 MG/DL   CBC WITH AUTOMATED DIFF    Collection Time: 03/26/17  5:44 AM   Result Value Ref Range    WBC 18.9 (H) 4.6 - 13.2 K/uL    RBC 2.87 (L) 4.70 - 5.50 M/uL    HGB 8.2 (L) 13.0 - 16.0 g/dL    HCT 24.8 (L) 36.0 - 48.0 %    MCV 86.4 74.0 - 97.0 FL    MCH 28.6 24.0 - 34.0 PG    MCHC 33.1 31.0 - 37.0 g/dL    RDW 19.5 (H) 11.6 - 14.5 %    PLATELET 678 481 - 491 K/uL    MPV 9.8 9.2 - 11.8 FL    NEUTROPHILS 75 (H) 40 - 73 %    LYMPHOCYTES 13 (L) 21 - 52 %    MONOCYTES 9 3 - 10 %    EOSINOPHILS 3 0 - 5 %    BASOPHILS 0 0 - 2 %    ABS. NEUTROPHILS 14.3 (H) 1.8 - 8.0 K/UL    ABS. LYMPHOCYTES 2.4 0.9 - 3.6 K/UL    ABS. MONOCYTES 1.7 (H) 0.05 - 1.2 K/UL    ABS. EOSINOPHILS 0.6 (H) 0.0 - 0.4 K/UL    ABS.  BASOPHILS 0.1 (H) 0.0 - 0.06 K/UL    DF AUTOMATED       Medications:  Current Facility-Administered Medications   Medication Dose Route Frequency    HYDROmorphone (PF) (DILAUDID) injection 1 mg  1 mg IntraVENous Q2H PRN    ampicillin-sulbactam (UNASYN) 3 g in 0.9% sodium chloride (MBP/ADV) 100 mL MBP  3 g IntraVENous Q8H    0.9% sodium chloride infusion 250 mL  250 mL IntraVENous PRN    ondansetron hcl (ZOFRAN) tablet 8 mg  8 mg Oral BID    prochlorperazine (COMPAZINE) tablet 10 mg  10 mg Oral QID    megestrol (MEGACE) 400 mg/10 mL (10 mL) oral suspension 200 mg  200 mg Oral DAILY    acetaminophen (TYLENOL) tablet 650 mg  650 mg Oral Q6H PRN    albuterol-ipratropium (DUO-NEB) 2.5 MG-0.5 MG/3 ML  3 mL Nebulization Q6H PRN    amLODIPine (NORVASC) tablet 10 mg  10 mg Oral DAILY    carvedilol (COREG) tablet 6.25 mg  6.25 mg Oral BID WITH MEALS    cyanocobalamin (VITAMIN B12) tablet 100 mcg  100 mcg Oral DAILY    doxepin (SINEquan) capsule 50 mg  50 mg Oral QHS    folic acid (FOLVITE) tablet 1 mg  1 mg Oral DAILY    pyridoxine (vitamin B6) (VITAMIN B-6) tablet 100 mg  100 mg Oral DAILY    traZODone (DESYREL) tablet 50 mg  50 mg Oral QHS PRN    diphenhydrAMINE (BENADRYL) injection 12.5 mg  12.5 mg IntraVENous Q4H PRN    0.9% sodium chloride infusion 250 mL  250 mL IntraVENous PRN    sodium chloride (NS) flush 5-10 mL  5-10 mL IntraVENous Q8H    sodium chloride (NS) flush 5-10 mL  5-10 mL IntraVENous PRN    LORazepam (ATIVAN) tablet 1 mg  1 mg Oral Q1H PRN    Or    LORazepam (ATIVAN) injection 1 mg  1 mg IntraVENous Q1H PRN    LORazepam (ATIVAN) tablet 2 mg  2 mg Oral Q1H PRN    Or    LORazepam (ATIVAN) injection 2 mg  2 mg IntraVENous Q1H PRN    LORazepam (ATIVAN) injection 3 mg  3 mg IntraVENous Q15MIN PRN    nicotine (NICODERM CQ) 21 mg/24 hr patch 1 Patch  1 Patch TransDERmal DAILY       Recent Results (from the past 24 hour(s))   LD    Collection Time: 03/26/17  5:44 AM   Result Value Ref Range     81 - 234 U/L   MAGNESIUM    Collection Time: 03/26/17  5:44 AM   Result Value Ref Range    Magnesium 1.8 1.8 - 2.4 mg/dL   METABOLIC PANEL, BASIC    Collection Time: 03/26/17  5:44 AM   Result Value Ref Range    Sodium 139 136 - 145 mmol/L    Potassium 5.9 (H) 3.5 - 5.5 mmol/L    Chloride 110 (H) 100 - 108 mmol/L    CO2 21 21 - 32 mmol/L    Anion gap 8 3.0 - 18 mmol/L    Glucose 96 74 - 99 mg/dL    BUN 27 (H) 7.0 - 18 MG/DL    Creatinine 2.38 (H) 0.6 - 1.3 MG/DL    BUN/Creatinine ratio 11 (L) 12 - 20      GFR est AA 35 (L) >60 ml/min/1.73m2    GFR est non-AA 29 (L) >60 ml/min/1.73m2    Calcium 8.2 (L) 8.5 - 10.1 MG/DL   CBC WITH AUTOMATED DIFF    Collection Time: 03/26/17  5:44 AM   Result Value Ref Range    WBC 18.9 (H) 4.6 - 13.2 K/uL    RBC 2.87 (L) 4.70 - 5.50 M/uL    HGB 8.2 (L) 13.0 - 16.0 g/dL    HCT 24.8 (L) 36.0 - 48.0 %    MCV 86.4 74.0 - 97.0 FL    MCH 28.6 24.0 - 34.0 PG    MCHC 33.1 31.0 - 37.0 g/dL    RDW 19.5 (H) 11.6 - 14.5 %    PLATELET 313 078 - 559 K/uL    MPV 9.8 9.2 - 11.8 FL    NEUTROPHILS 75 (H) 40 - 73 %    LYMPHOCYTES 13 (L) 21 - 52 %    MONOCYTES 9 3 - 10 %    EOSINOPHILS 3 0 - 5 %    BASOPHILS 0 0 - 2 %    ABS. NEUTROPHILS 14.3 (H) 1.8 - 8.0 K/UL    ABS. LYMPHOCYTES 2.4 0.9 - 3.6 K/UL    ABS. MONOCYTES 1.7 (H) 0.05 - 1.2 K/UL    ABS. EOSINOPHILS 0.6 (H) 0.0 - 0.4 K/UL    ABS.  BASOPHILS 0.1 (H) 0.0 - 0.06 K/UL    DF AUTOMATED         Procedures/imaging: see electronic medical records for all procedures/Xrays and details which were not copied into this note but were reviewed prior to creation of Ermias Bryan MD   3/26/2017, 9:57 AM

## 2017-03-27 LAB
ALBUMIN SERPL BCP-MCNC: 2 G/DL (ref 3.4–5)
ALBUMIN/GLOB SERPL: 0.5 {RATIO} (ref 0.8–1.7)
ALP SERPL-CCNC: 56 U/L (ref 45–117)
ALT SERPL-CCNC: 12 U/L (ref 16–61)
ANION GAP BLD CALC-SCNC: 10 MMOL/L (ref 3–18)
AST SERPL W P-5'-P-CCNC: 15 U/L (ref 15–37)
BACTERIA SPEC CULT: ABNORMAL
BACTERIA SPEC CULT: ABNORMAL
BACTERIA SPEC CULT: NORMAL
BACTERIA SPEC CULT: NORMAL
BASOPHILS # BLD AUTO: 0.1 K/UL (ref 0–0.06)
BASOPHILS # BLD: 0 % (ref 0–2)
BILIRUB SERPL-MCNC: 0.2 MG/DL (ref 0.2–1)
BUN SERPL-MCNC: 30 MG/DL (ref 7–18)
BUN/CREAT SERPL: 11 (ref 12–20)
CALCIUM SERPL-MCNC: 8.2 MG/DL (ref 8.5–10.1)
CHLORIDE SERPL-SCNC: 108 MMOL/L (ref 100–108)
CO2 SERPL-SCNC: 20 MMOL/L (ref 21–32)
CREAT SERPL-MCNC: 2.63 MG/DL (ref 0.6–1.3)
DIFFERENTIAL METHOD BLD: ABNORMAL
EOSINOPHIL # BLD: 0.7 K/UL (ref 0–0.4)
EOSINOPHIL NFR BLD: 4 % (ref 0–5)
ERYTHROCYTE [DISTWIDTH] IN BLOOD BY AUTOMATED COUNT: 19.4 % (ref 11.6–14.5)
GLOBULIN SER CALC-MCNC: 4.1 G/DL (ref 2–4)
GLUCOSE SERPL-MCNC: 100 MG/DL (ref 74–99)
HCT VFR BLD AUTO: 23.8 % (ref 36–48)
HGB BLD-MCNC: 7.8 G/DL (ref 13–16)
LDH SERPL L TO P-CCNC: 145 U/L (ref 81–234)
LYMPHOCYTES # BLD AUTO: 17 % (ref 21–52)
LYMPHOCYTES # BLD: 3 K/UL (ref 0.9–3.6)
MAGNESIUM SERPL-MCNC: 1.7 MG/DL (ref 1.8–2.4)
MCH RBC QN AUTO: 28.3 PG (ref 24–34)
MCHC RBC AUTO-ENTMCNC: 32.8 G/DL (ref 31–37)
MCV RBC AUTO: 86.2 FL (ref 74–97)
MONOCYTES # BLD: 1.4 K/UL (ref 0.05–1.2)
MONOCYTES NFR BLD AUTO: 8 % (ref 3–10)
NEUTS SEG # BLD: 12 K/UL (ref 1.8–8)
NEUTS SEG NFR BLD AUTO: 71 % (ref 40–73)
PHOSPHATE SERPL-MCNC: 4.6 MG/DL (ref 2.5–4.9)
PLATELET # BLD AUTO: 372 K/UL (ref 135–420)
PMV BLD AUTO: 9.6 FL (ref 9.2–11.8)
POTASSIUM SERPL-SCNC: 5.7 MMOL/L (ref 3.5–5.5)
PROT SERPL-MCNC: 6.1 G/DL (ref 6.4–8.2)
RBC # BLD AUTO: 2.76 M/UL (ref 4.7–5.5)
SERVICE CMNT-IMP: ABNORMAL
SERVICE CMNT-IMP: NORMAL
SERVICE CMNT-IMP: NORMAL
SODIUM SERPL-SCNC: 138 MMOL/L (ref 136–145)
WBC # BLD AUTO: 17.1 K/UL (ref 4.6–13.2)

## 2017-03-27 PROCEDURE — 84100 ASSAY OF PHOSPHORUS: CPT | Performed by: SURGERY

## 2017-03-27 PROCEDURE — 74011250636 HC RX REV CODE- 250/636: Performed by: HOSPITALIST

## 2017-03-27 PROCEDURE — 74011250636 HC RX REV CODE- 250/636: Performed by: FAMILY MEDICINE

## 2017-03-27 PROCEDURE — 74011250636 HC RX REV CODE- 250/636: Performed by: INTERNAL MEDICINE

## 2017-03-27 PROCEDURE — 83735 ASSAY OF MAGNESIUM: CPT | Performed by: SURGERY

## 2017-03-27 PROCEDURE — 80053 COMPREHEN METABOLIC PANEL: CPT | Performed by: SURGERY

## 2017-03-27 PROCEDURE — 83615 LACTATE (LD) (LDH) ENZYME: CPT | Performed by: SURGERY

## 2017-03-27 PROCEDURE — 36415 COLL VENOUS BLD VENIPUNCTURE: CPT | Performed by: SURGERY

## 2017-03-27 PROCEDURE — 74011250637 HC RX REV CODE- 250/637: Performed by: INTERNAL MEDICINE

## 2017-03-27 PROCEDURE — 74011000258 HC RX REV CODE- 258: Performed by: INTERNAL MEDICINE

## 2017-03-27 PROCEDURE — 74011250637 HC RX REV CODE- 250/637: Performed by: FAMILY MEDICINE

## 2017-03-27 PROCEDURE — 65270000029 HC RM PRIVATE

## 2017-03-27 PROCEDURE — 85025 COMPLETE CBC W/AUTO DIFF WBC: CPT | Performed by: SURGERY

## 2017-03-27 RX ORDER — MAGNESIUM SULFATE HEPTAHYDRATE 40 MG/ML
2 INJECTION, SOLUTION INTRAVENOUS ONCE
Status: COMPLETED | OUTPATIENT
Start: 2017-03-27 | End: 2017-03-27

## 2017-03-27 RX ADMIN — PROCHLORPERAZINE MALEATE 10 MG: 10 TABLET, FILM COATED ORAL at 12:27

## 2017-03-27 RX ADMIN — HYDROMORPHONE HYDROCHLORIDE 1 MG: 1 INJECTION, SOLUTION INTRAMUSCULAR; INTRAVENOUS; SUBCUTANEOUS at 01:29

## 2017-03-27 RX ADMIN — PROCHLORPERAZINE MALEATE 10 MG: 10 TABLET, FILM COATED ORAL at 21:22

## 2017-03-27 RX ADMIN — HYDROMORPHONE HYDROCHLORIDE 1 MG: 1 INJECTION, SOLUTION INTRAMUSCULAR; INTRAVENOUS; SUBCUTANEOUS at 06:20

## 2017-03-27 RX ADMIN — CARVEDILOL 6.25 MG: 3.12 TABLET, FILM COATED ORAL at 16:33

## 2017-03-27 RX ADMIN — HYDROMORPHONE HYDROCHLORIDE 1 MG: 1 INJECTION, SOLUTION INTRAMUSCULAR; INTRAVENOUS; SUBCUTANEOUS at 23:16

## 2017-03-27 RX ADMIN — VITAM B12 100 MCG: 100 TAB at 12:27

## 2017-03-27 RX ADMIN — AMPICILLIN SODIUM AND SULBACTAM SODIUM 3 G: 2; 1 INJECTION, POWDER, FOR SOLUTION INTRAMUSCULAR; INTRAVENOUS at 13:10

## 2017-03-27 RX ADMIN — Medication 10 ML: at 06:25

## 2017-03-27 RX ADMIN — ONDANSETRON HYDROCHLORIDE 8 MG: 4 TABLET, FILM COATED ORAL at 10:36

## 2017-03-27 RX ADMIN — CARVEDILOL 6.25 MG: 3.12 TABLET, FILM COATED ORAL at 10:36

## 2017-03-27 RX ADMIN — PROCHLORPERAZINE MALEATE 10 MG: 10 TABLET, FILM COATED ORAL at 10:36

## 2017-03-27 RX ADMIN — DOXEPIN HYDROCHLORIDE 50 MG: 50 CAPSULE ORAL at 21:12

## 2017-03-27 RX ADMIN — Medication 10 ML: at 21:22

## 2017-03-27 RX ADMIN — AMPICILLIN SODIUM AND SULBACTAM SODIUM 3 G: 2; 1 INJECTION, POWDER, FOR SOLUTION INTRAMUSCULAR; INTRAVENOUS at 06:20

## 2017-03-27 RX ADMIN — HYDROMORPHONE HYDROCHLORIDE 1 MG: 1 INJECTION, SOLUTION INTRAMUSCULAR; INTRAVENOUS; SUBCUTANEOUS at 03:34

## 2017-03-27 RX ADMIN — ONDANSETRON HYDROCHLORIDE 8 MG: 4 TABLET, FILM COATED ORAL at 21:11

## 2017-03-27 RX ADMIN — FOLIC ACID 1 MG: 1 TABLET ORAL at 10:36

## 2017-03-27 RX ADMIN — HYDROMORPHONE HYDROCHLORIDE 1 MG: 1 INJECTION, SOLUTION INTRAMUSCULAR; INTRAVENOUS; SUBCUTANEOUS at 13:10

## 2017-03-27 RX ADMIN — HYDROMORPHONE HYDROCHLORIDE 1 MG: 1 INJECTION, SOLUTION INTRAMUSCULAR; INTRAVENOUS; SUBCUTANEOUS at 10:50

## 2017-03-27 RX ADMIN — AMLODIPINE BESYLATE 10 MG: 5 TABLET ORAL at 10:36

## 2017-03-27 RX ADMIN — PROCHLORPERAZINE MALEATE 10 MG: 10 TABLET, FILM COATED ORAL at 18:00

## 2017-03-27 RX ADMIN — HYDROMORPHONE HYDROCHLORIDE 1 MG: 1 INJECTION, SOLUTION INTRAMUSCULAR; INTRAVENOUS; SUBCUTANEOUS at 16:33

## 2017-03-27 RX ADMIN — Medication 10 ML: at 16:33

## 2017-03-27 RX ADMIN — Medication 100 MG: at 10:36

## 2017-03-27 RX ADMIN — AMPICILLIN SODIUM AND SULBACTAM SODIUM 3 G: 2; 1 INJECTION, POWDER, FOR SOLUTION INTRAMUSCULAR; INTRAVENOUS at 21:12

## 2017-03-27 RX ADMIN — MAGNESIUM SULFATE HEPTAHYDRATE 2 G: 40 INJECTION, SOLUTION INTRAVENOUS at 17:02

## 2017-03-27 RX ADMIN — MEGESTROL ACETATE 200 MG: 40 SUSPENSION ORAL at 10:49

## 2017-03-27 RX ADMIN — HYDROMORPHONE HYDROCHLORIDE 1 MG: 1 INJECTION, SOLUTION INTRAMUSCULAR; INTRAVENOUS; SUBCUTANEOUS at 21:12

## 2017-03-27 NOTE — PROGRESS NOTES
Received message that pt is declined by [facility]; CM met with pt who stated he would not go to SNF anyway, but is considering accepting home health physical therapy. CM provided [facility] list of [facility] agencies. Pt requested letter for his landlady to help him get a ground floor apartment as he feels he will have difficulty getting up stairs. CM drafted letter for doctor to sign, and will fax to pt's landlady MsEmelia Liz Rod at [facility] # [phone]. Pt also asked about a cane and CM provided him with flyer for the [facility] free DME program. Pt stated that his address is correct in chart, but the apartment # has changed to:  [address], [address]
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
## 2017-03-27 NOTE — ADT AUTH CERT NOTES
Patient Demographics        Patient Name 73 [address] Sex  Address Phone       Melissa Gloria [id number] Male 1960 [address] [place] [id number] [phone] (Mobile)           CSN:       [id number]           Admit Date: Admit Time Room Bed       Mar 16, 2017  8:54  [[id number]] 01 [[id number]]           Attending Providers        Provider Pager From To       Robert Jara MD  17       Drema Lennox, MD  17            Emergency Contact(s)        Name Relation Home Work Mobile       Red Cardoza [phone]           Utilization Review           Renal Failure, Acute - Care Day 12 (3/27/2017) by Evelia Cheadle, RN        Review Entered Review Status       3/27/2017 Completed       Details              Care Day: 12 Care Date: 3/27/2017 Level of Care: Inpatient Floor       Guideline Day 4        Level Of Care       (X) Floor to discharge              Clinical Status       (X) * Mental and respiratory status at baseline or acceptable for next level of care       (X) * No active comorbid conditions requiring inpatient care       (X) * Electrolyte abnormalities absent or acceptable for next level of care       (X) * Acid-base abnormalities absent       ( ) * Volume status at baseline or acceptable for next level of care       (X) * Diet tolerated       (X) * Dialysis not needed, or access and plan established       ( ) * Discharge plans and education understood              Activity       (X) * Ambulatory [I]              Routes       (X) * Oral hydration, medications, and diet       (X) Renal diet as tolerated       (X) Possible calorie count, nutritional assessment              Interventions       (X) Monitor electrolytes, renal function tests, acid-base, and volume status       (X) Dietary and fluid management counseling              Medications       (X) Possible medical therapies              3/27/2017 3:52 PM EDT by Christiano Hawkins       Subject: Additional Clinical Information               ã                  ã         ã           99., HR 88, RR 18, 99% RA, 146/88. MEDs: NORVASC PO, UNASYN IV Q8, COREG PO, SINEQUAN PO, FOLVITE PO, DILAUDID IV, NICODERM, ZOFRAN PO, COMPAZINE PO, VIT B6, DESYREL PO, VIT B12, MEGACE PO. LABS: WBC 17.1, RBC 2.76, HGB 7.8, HCT 23.8, RDW 19.4, LYMPH 17, K 5.97, CO2 20, BUN 30, CREAT 2.63, CA 8.2, MAG 1.7, PROT 6.1, ALT 12, GLOB 4.1.  ORDERS: CONTACT ISO.                                                      * Milestone              Additional Notes       NO PROGRESS NOTES FOR 3/27 AT THIS POINT.           Renal Failure, Acute - Care Day 11 (3/26/2017) by Kina Flores RN        Review Entered Review Status       3/27/2017 Completed       Details              Care Day: 11 Care Date: 3/26/2017 Level of Care: Inpatient Floor       Guideline Day 4        Level Of Care       (X) Floor to discharge              Clinical Status       (X) * Mental and respiratory status at baseline or acceptable for next level of care       (X) * No active comorbid conditions requiring inpatient care       (X) * Electrolyte abnormalities absent or acceptable for next level of care       (X) * Acid-base abnormalities absent       ( ) * Volume status at baseline or acceptable for next level of care       (X) * Diet tolerated       (X) * Dialysis not needed, or access and plan established       ( ) * Discharge plans and education understood              Activity       (X) * Ambulatory [I]              Routes       (X) * Oral hydration, medications, and diet       (X) Renal diet as tolerated       (X) Possible calorie count, nutritional assessment              Interventions       (X) Monitor electrolytes, renal function tests, acid-base, and volume status       (X) Dietary and fluid management counseling              Medications       (X) Possible medical therapies              3/27/2017 3:49 PM EDT by Mora Alvarez       Subject: Additional Clinical Information       98.6, HR 85, RR 18, 100%2LNC, 163/81. MEDs: NORVASC PO, UNASYN IV Q8, COREG PO, SINEQUAN PO, FOLVITE PO, DILAUDID IV, NICODERM, ZOFRAN PO, COMPAZINE PO, VIT B6, DESYREL PO. LABS: WBC 18.9, RBC 2.87, HGB 8.2, HCT 24.8, RDW 19.5, NEUTRO 75, LYMPH 13, K 5.9, CHLORIDE 110, BUN 27, CREAT 2.38, CA 8. 2. ORDERS: CONTACT ISO.                                   * Milestone              Additional Notes       Oncology:       Subjective:               Nilton Tijerina is a 55 y.o., [address] male, who is being seen for sickle cell anemia       Assessment:               Principal Problem:       Acute hyperkalemia (3/16/2017)               Active Problems:       Sickle cell anemia (HCC) (11/4/2014)               Sickle cell crisis ([facility] (11/20/2016)               EDGAR (acute kidney injury) ([facility] (11/20/2016)               Narcotic overdose (3/17/2017)               Hyponatremia (3/17/2017)               Abscess of buttock, right (3/20/2017)               Pain of right hip joint (3/20/2017)               Bilateral pleural effusion (3/20/2017)               CKD (chronic kidney disease) stage 3, GFR 30-59 ml/min (3/21/2017)               new diarrhea: check cdiff       Sickle cell anemia, hgb stable. Wbc better       Hyperkalemia       Iron overload                       Plan:       Folate       Check cdiff                     Id:       unasyn       Rec: 1.  Abscess       Rt buttocls       S/p incision/ drainage       No mrsa isolated - alpha strep       Leukocytosis persists - slightly decreased               ==> local care       ==> changed abx to unasyn       ==> could consider oral augmentin x 10 days on discharge        D/w patient at length               Subjective: \"I feel a little better - less drainage\"       No n/v/d       No cough/sputum       No abd pain        No rash                     Hospitalist\"       Principal Problem:       Acute hyperkalemia (3/16/2017)               Active Problems:     Sickle cell anemia (HCC) (11/4/2014)               Sickle cell crisis ([facility] (11/20/2016)               EDGAR (acute kidney injury) ([facility] (11/20/2016)               Narcotic overdose (3/17/2017)               Hyponatremia (3/17/2017)               Abscess of buttock, right (3/20/2017)               Pain of right hip joint (3/20/2017)               Bilateral pleural effusion (3/20/2017)               CKD (chronic kidney disease) stage 3, GFR 30-59 ml/min (3/21/2017)                               1) R buttock absscess / sepsis -- Wbc improving. Change abx to po augmentin from tomorrow               2) CKD with RAVINDRA - cr is improvign               3) sicnele cell anemia - appriciate hematology input               4) FEN - labs reviewed and as ordered               5) Dispo - OOb today. Plan for dc tomorrow if stable               Patient's condition is                                Recommend to continue hospitalization.       Discussed with patient.               Chief Complaints:               Chief Complaint       Patient presents with       o Altered mental status       o Sickle Cell Crisis               SUBJECTIVE:       Pt is seen and examined. Chart reviewed. Feels better but still weak.  today and still co buttock and abd pain but 'not as bad\"

## 2017-03-27 NOTE — PROGRESS NOTES
Infectious Disease Progress Note    Antibiotics:  unasyn day 4         abx day 7    CC:sickle crisis. Buttock abscess    Subj:55 yo male admitted with mental status changes and found to have sickle crisis. He also had a 5 day history of buttock pain on the right and noted to have abscess now s/p I and D by surgery on 3/22/17 which he tolerated well. He is with no new c/o today. He reports continued pain at the L abdomen today which has been present for the past few days. He reports continued drainage from the R buttock abscesss area, but overall it seems improved. He denies diarrhea or abd pain or cramps. No fever or chills  He denies sob or cough  No n/v  No rash    Physical Exam:  Vital signs reviewed  TmaxAF  General:  NAD  HEENT:  PERRL, OP clear, sclera anicteric  Neck:  Supple  CHest:  CTA B  CV:  RRR no murmur noted  Abd:  Soft mild tenderenss to palpation along L abd side. No rebound or peritoneal signs  R buttock with penrose drain in place. No active drainage, but dressings are saturated. No surrounding erythema or induraiton and non tender to palpation  Ext:  No edema  Skin:  No rash    Labs, cultures and radiology reviewed  Wbc 17.1    crt 2.63    Abscess:  S viridans and MRStaph Epi    Assessment/ Plan:  -  R buttock abscess          Feel most important org is S viridans          Would continue local wound care per surgery           Continue unasyn for now. Recommend change to oral augmentin at time of discharge           Clinically improved    -  Sickle crisis followed by hematology    -  Leukocytosis           Suspect related to sickel crisis.       -  Diarrhea appears to have resolved    -  Mult med issues;  HTN, Hep C, hx of R THR approx 2.5 yrs ago

## 2017-03-27 NOTE — ADT AUTH CERT NOTES
Patient Demographics        Patient Name 73 Zoila Kelley Sex  Address Phone       Carlita Enamorado [id number] Male 1960 [address] [place], [place] [id number] [phone] (Mobile)           CSN:       [id number]           Admit Date: Admit Time Room Bed       Mar 16, 2017  8:54  [[id number]] 01 [[id number]]           Attending Providers        Provider Pager From To       Kaykay Rebolledo MD  17       Gina Lopez MD  17            Emergency Contact(s)        Name Relation Home Work Mobile       Kirsten Allen [phone]           Utilization Review           Renal Failure, Acute - Care Day 10 (3/25/2017) by Mike Hooper RN        Review Entered Review Status       3/27/2017 Completed       Details              Care Day: 10 Care Date: 3/25/2017 Level of Care: Inpatient Floor       Guideline Day 4        Level Of Care       (X) Floor to discharge              Clinical Status       (X) * Mental and respiratory status at baseline or acceptable for next level of care       (X) * No active comorbid conditions requiring inpatient care       (X) * Electrolyte abnormalities absent or acceptable for next level of care       (X) * Acid-base abnormalities absent       ( ) * Volume status at baseline or acceptable for next level of care       (X) * Diet tolerated       (X) * Dialysis not needed, or access and plan established       ( ) * Discharge plans and education understood              Activity       (X) * Ambulatory [I]              Routes       (X) * Oral hydration, medications, and diet       3/27/2017 1:25 PM EDT by Gallo Vaughn         TYLENOL PO, NORVASC PO, UNASYN IV Q8, COREG PO, VIT B12 PO, SINEQUAN PO, FOLVITE PO, MEGACE PO, NIODERM, ZOFRAN PO, COMPAZINE PO, VIT B, KAYEXALATE PO, DILAUDID IV, MORPHINE IV.              (X) Renal diet as tolerated       (X) Possible calorie count, nutritional assessment              Interventions       (X) Monitor electrolytes, renal function tests, acid-base, and volume status       3/27/2017 1:25 PM EDT by Josi Hughes         K 5.7, CO2 19, , BUN 29, CREAT 2.39, CA 8.3              (X) Dietary and fluid management counseling              Medications       (X) Possible medical therapies                                   * Milestone              Additional Notes       HOSPITALIST:       Chief Complaint: abscess, anemia , hip pain                Assessment/Plan                      Patient Active Problem List       Diagnosis Code       o Avascular necrosis of bone of right hip (HCC) M87.051       o Sickle cell anemia (HCC) D57. 1       o ETOH abuse F10.10       o Chronic hepatitis C (HCC) B18.2       o Avascular necrosis of hip (HCC) M87.059       o Dislocation of hip joint prosthesis ([facility] T84.029A, Z96.649       o Suicidal ideation R45. 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       o MDD (major depressive disorder) F32.9       o Sickle cell crisis ([facility] D57.00       o EDGAR (acute kidney injury) ([facility] N17.9       o Dehydration E86.0       o Drug abuse F19.10       o Acute hyperkalemia E87.5       o Narcotic overdose T40.601A       o Hyponatremia E87.1       o Abscess of buttock, right L02.31       o Pain of right hip joint M25.551       o Bilateral pleural effusion J90       o CKD (chronic kidney disease) stage 3, GFR 30-59 ml/min N18.3               1.acute hyperkalemia; Resolved        2. CKD 3 cr 2.39 (was 2.39 on 12/19/2016) stable        3 Sickle cell anemia with crisis. LDH wnl, resolved,        4. narcotic overdose       5 alcohol abuse; stable CIWA protocol discontinued.        6. soft tissue abscess rt buttock. Abx changed to Unasyn as per ID, WBC improving.       - I &d per Dr. Mc Hayes , appreciated. Will continue local wound care ,        -wound cx: POSSIBLE STREPTOCOCCI, ALPHA HEMOLYTIC       As per Dr. Tacho Tierney , no iv abx needed on d.c        7 hip pain;  No fracture from xray, ct pelvic performed, no active joint infection noted, appreciated ortho on board        8 ,bilateral effusion; off nc O2 now. echo ordered, not performed, will reschedule if clinical sob        9 sepsis; Due to abscess, resolving. Wbc improving,        10 sickle cell anemia; Received transfusion for procedure, resolved                        Subjective:       No new complaints. Feeling better this morning.                                     ID:        unasyn       Rec: 1.  Abscess       Rt buttocls       S/p incision/ drainage       No mrsa isolated - alpha strep       Leukocytosis persists - slightly decreased               ==> local care       ==> changed abx to unasyn       ==> could consider oral augmentin x 10 days on discharge        D/w patient at length               Subjective: \"I feel a little better - I alejandra too much back there\"       No n/v/d       No cough/sputum       No abd pain        No rash

## 2017-03-27 NOTE — PROGRESS NOTES
Hospitalist Progress Note-critical care note     Patient: Sarahi Ramesh MRN: [id number]  CSN: [id number]    YOB: 1960  Age: 55 y.o. 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 ([facility] [address] F19.94    MDD (major depressive disorder) F32.9    Sickle cell crisis ([facility] [address] D57.00    EDGAR (acute kidney injury) ([facility] [address] N17.9    Dehydration E86.0    Drug abuse F19.10    Acute hyperkalemia E87.5    Narcotic overdose T40.601A    Hyponatremia E87.1    Abscess of buttock, right L02.31    Pain of right hip joint M25.551    Bilateral pleural effusion J90    CKD (chronic kidney disease) stage 3, GFR 30-59 ml/min N18.3       1.acute hyperkalemia; Resolved   2. CKD 3 cr was 2.39 on 12/19/2016   stable   3 Sickle cell anemia with crisis. LDH wnl, resolved,   4. narcotic overdose  5 alcohol abuse; on CIWA protocol. Doing well, stopped ciwa protoc   6. soft tissue abscess rt buttock  On unysn   - I &d per  Dr. Weston Rodrigez , appreciated.  Will continue local wound care ,   -will dc with po abx for 10 days   7 hip pain   No fracture from xray, ct pelvic performed, no active joint infection noted, appreciated ortho on board   8 ,bilateral effusion   off nc O2 now.  echo ordered, not performed, will reschedule if clinical sob   9 sepsis   Due to abscess, resolving, wbc 17 , improving   10 sickle cell anemia   Received transfusion for procedure , resolved   11 hypomagensemia   Mg replace     Subjective: feel fine Nurse: no acute issue     Plan d.c home tomorrow     Review of systems:    General: No fevers or chills. Cardiovascular: No chest pain or pressure. No palpitations. Pulmonary: No shortness of breath. Gastrointestinal: No nausea, vomiting. Vital signs/Intake and Output:  Visit Vitals    /79 (BP 1 Location: Right arm, BP Patient Position: At rest)    Pulse 82    Temp 99 °F (37.2 °C)    Resp 20    Ht 5' 5\" (1.651 m)    Wt 73.5 kg (162 lb 0.6 oz)    SpO2 100%    BMI 26.96 kg/m2     Current Shift:  03/27 0701 - 03/27 1900  In: 480 [P.O.:480]  Out: 350 [Urine:350]  Last three shifts:  03/25 1901 - 03/27 0700  In: 580 [P.O.:480; I.V.:100]  Out: 940 [Urine:940]    Physical Exam:  General: WD, WN. Alert, cooperative, no acute distress    HEENT: NC, Atraumatic. PERRLA, anicteric sclerae. Lungs: CTA Bilaterally. No Wheezing/Rhonchi/Rales. Heart:  Regular  rhythm,  No murmur, No Rubs, No Gallops  Abdomen: Soft, Non distended, Non tender.  +Bowel sounds,   Extremities: No c/c/e  Psych:   Not anxious or agitated. Neurologic:  No acute neurological deficit. Skin: wound covered with dressing ,mild drainage noted         Labs: Results:       Chemistry Recent Labs      03/27/17 0524 03/26/17 0544 03/25/17   0525   GLU  100*  96  128*   NA  138  139  137   K  5.7*  5.9*  5.7*   CL  108  110*  108   CO2  20*  21  19*   BUN  30*  27*  29*   CREA  2.63*  2.38*  2.39*   CA  8.2*  8.2*  8.3*   AGAP  10  8  10   BUCR  11*  11*  12   AP  56   --    --    TP  6.1*   --    --    ALB  2.0*   --    --    GLOB  4.1*   --    --    AGRAT  0.5*   --    --       CBC w/Diff Recent Labs      03/27/17 0524 03/26/17 0544   WBC  17.1*  18.9*   RBC  2.76*  2.87*   HGB  7.8*  8.2*   HCT  23.8*  24.8*   PLT  372  361   GRANS  71  75*   LYMPH  17*  13*   EOS  4  3      Cardiac Enzymes No results for input(s): CPK, CKND1, STEFAN in the last 72 hours.     No lab exists for component: CKRMB, TROIP   Coagulation No results for input(s): PTP, INR, APTT in the last 72 hours. No lab exists for component: INREXT, INREXT    Lipid Panel No results found for: CHOL, CHOLPOCT, CHOLX, CHLST, CHOLV, [id number], HDL, LDL, NLDLCT, DLDL, LDLC, DLDLP, 114820, VLDLC, VLDL, TGL, TGLX, TRIGL, [id number], TRIGP, TGLPOCT, [id number], CHHD, CHHDX   BNP No results for input(s): BNPP in the last 72 hours.    Liver Enzymes Recent Labs      03/27/17   0524   TP  6.1*   ALB  2.0*   AP  56   SGOT  15      Thyroid Studies Lab Results   Component Value Date/Time    TSH 1.08 03/19/2017 06:05 AM        Procedures/imaging: see electronic medical records for all procedures/Xrays and details which were not copied into this note but were reviewed prior to creation of José Blair MD

## 2017-03-27 NOTE — PROGRESS NOTES
2245- Pt had large bowel movement that was soft and formed. Stool does not follow C-diff guidelines, sample not sent for testing at this time. Shift Summary- Pt only had one bowel movement during the night which was formed. Pt medicated for pain to right hip and buttocks several times during the night. Pt asking about cane, case management consult in place. Dressing to buttocks remains CDI.     Patient Vitals for the past 12 hrs:   Temp Pulse Resp BP SpO2   03/27/17 0503 98.2 °F (36.8 °C) 90 18 144/66 97 %   03/26/17 2253 98.9 °F (37.2 °C) 86 18 157/88 100 %

## 2017-03-28 LAB
ANION GAP BLD CALC-SCNC: 10 MMOL/L (ref 3–18)
BASOPHILS # BLD AUTO: 0.1 K/UL (ref 0–0.06)
BASOPHILS # BLD: 1 % (ref 0–2)
BUN SERPL-MCNC: 33 MG/DL (ref 7–18)
BUN/CREAT SERPL: 12 (ref 12–20)
CALCIUM SERPL-MCNC: 8.2 MG/DL (ref 8.5–10.1)
CHLORIDE SERPL-SCNC: 106 MMOL/L (ref 100–108)
CO2 SERPL-SCNC: 21 MMOL/L (ref 21–32)
CREAT SERPL-MCNC: 2.68 MG/DL (ref 0.6–1.3)
DIFFERENTIAL METHOD BLD: ABNORMAL
EOSINOPHIL # BLD: 0.8 K/UL (ref 0–0.4)
EOSINOPHIL NFR BLD: 5 % (ref 0–5)
ERYTHROCYTE [DISTWIDTH] IN BLOOD BY AUTOMATED COUNT: 19.7 % (ref 11.6–14.5)
GLUCOSE SERPL-MCNC: 127 MG/DL (ref 74–99)
HCT VFR BLD AUTO: 22.9 % (ref 36–48)
HGB BLD-MCNC: 7.7 G/DL (ref 13–16)
LDH SERPL L TO P-CCNC: 153 U/L (ref 81–234)
LYMPHOCYTES # BLD AUTO: 16 % (ref 21–52)
LYMPHOCYTES # BLD: 2.6 K/UL (ref 0.9–3.6)
MAGNESIUM SERPL-MCNC: 2 MG/DL (ref 1.8–2.4)
MCH RBC QN AUTO: 28.9 PG (ref 24–34)
MCHC RBC AUTO-ENTMCNC: 33.6 G/DL (ref 31–37)
MCV RBC AUTO: 86.1 FL (ref 74–97)
MONOCYTES # BLD: 1.3 K/UL (ref 0.05–1.2)
MONOCYTES NFR BLD AUTO: 8 % (ref 3–10)
NEUTS SEG # BLD: 11.2 K/UL (ref 1.8–8)
NEUTS SEG NFR BLD AUTO: 70 % (ref 40–73)
PLATELET # BLD AUTO: 411 K/UL (ref 135–420)
PMV BLD AUTO: 10.1 FL (ref 9.2–11.8)
POTASSIUM SERPL-SCNC: 5.4 MMOL/L (ref 3.5–5.5)
POTASSIUM SERPL-SCNC: 5.4 MMOL/L (ref 3.5–5.5)
POTASSIUM SERPL-SCNC: 5.8 MMOL/L (ref 3.5–5.5)
POTASSIUM SERPL-SCNC: 6.8 MMOL/L (ref 3.5–5.5)
RBC # BLD AUTO: 2.66 M/UL (ref 4.7–5.5)
SODIUM SERPL-SCNC: 137 MMOL/L (ref 136–145)
WBC # BLD AUTO: 16 K/UL (ref 4.6–13.2)

## 2017-03-28 PROCEDURE — 83735 ASSAY OF MAGNESIUM: CPT | Performed by: SURGERY

## 2017-03-28 PROCEDURE — 74011250636 HC RX REV CODE- 250/636: Performed by: FAMILY MEDICINE

## 2017-03-28 PROCEDURE — 74011250637 HC RX REV CODE- 250/637: Performed by: HOSPITALIST

## 2017-03-28 PROCEDURE — 74011000258 HC RX REV CODE- 258: Performed by: INTERNAL MEDICINE

## 2017-03-28 PROCEDURE — 36415 COLL VENOUS BLD VENIPUNCTURE: CPT | Performed by: SURGERY

## 2017-03-28 PROCEDURE — 94640 AIRWAY INHALATION TREATMENT: CPT

## 2017-03-28 PROCEDURE — 74011000258 HC RX REV CODE- 258: Performed by: HOSPITALIST

## 2017-03-28 PROCEDURE — 74011250636 HC RX REV CODE- 250/636: Performed by: INTERNAL MEDICINE

## 2017-03-28 PROCEDURE — 83615 LACTATE (LD) (LDH) ENZYME: CPT | Performed by: SURGERY

## 2017-03-28 PROCEDURE — 74011250636 HC RX REV CODE- 250/636: Performed by: HOSPITALIST

## 2017-03-28 PROCEDURE — 84132 ASSAY OF SERUM POTASSIUM: CPT | Performed by: HOSPITALIST

## 2017-03-28 PROCEDURE — 74011250637 HC RX REV CODE- 250/637: Performed by: FAMILY MEDICINE

## 2017-03-28 PROCEDURE — 93005 ELECTROCARDIOGRAM TRACING: CPT

## 2017-03-28 PROCEDURE — 74011250637 HC RX REV CODE- 250/637: Performed by: INTERNAL MEDICINE

## 2017-03-28 PROCEDURE — 85025 COMPLETE CBC W/AUTO DIFF WBC: CPT | Performed by: HOSPITALIST

## 2017-03-28 PROCEDURE — 65270000029 HC RM PRIVATE

## 2017-03-28 PROCEDURE — 74011000250 HC RX REV CODE- 250: Performed by: HOSPITALIST

## 2017-03-28 RX ORDER — SODIUM BICARBONATE 650 MG/1
650 TABLET ORAL 2 TIMES DAILY
Status: DISCONTINUED | OUTPATIENT
Start: 2017-03-28 | End: 2017-03-29 | Stop reason: HOSPADM

## 2017-03-28 RX ORDER — SODIUM POLYSTYRENE SULFONATE 15 G/60ML
15 SUSPENSION ORAL; RECTAL
Status: COMPLETED | OUTPATIENT
Start: 2017-03-28 | End: 2017-03-28

## 2017-03-28 RX ORDER — FUROSEMIDE 10 MG/ML
40 INJECTION INTRAMUSCULAR; INTRAVENOUS ONCE
Status: COMPLETED | OUTPATIENT
Start: 2017-03-28 | End: 2017-03-28

## 2017-03-28 RX ORDER — DEXTROSE 50 % IN WATER (D50W) INTRAVENOUS SYRINGE
25 ONCE
Status: COMPLETED | OUTPATIENT
Start: 2017-03-28 | End: 2017-03-28

## 2017-03-28 RX ORDER — INSULIN LISPRO 100 [IU]/ML
10 INJECTION, SOLUTION INTRAVENOUS; SUBCUTANEOUS ONCE
Status: COMPLETED | OUTPATIENT
Start: 2017-03-28 | End: 2017-03-28

## 2017-03-28 RX ORDER — ALBUTEROL SULFATE 0.83 MG/ML
2.5 SOLUTION RESPIRATORY (INHALATION)
Status: COMPLETED | OUTPATIENT
Start: 2017-03-28 | End: 2017-03-28

## 2017-03-28 RX ADMIN — Medication 10 ML: at 15:57

## 2017-03-28 RX ADMIN — HYDROMORPHONE HYDROCHLORIDE 1 MG: 1 INJECTION, SOLUTION INTRAMUSCULAR; INTRAVENOUS; SUBCUTANEOUS at 10:43

## 2017-03-28 RX ADMIN — FUROSEMIDE 40 MG: 10 INJECTION, SOLUTION INTRAMUSCULAR; INTRAVENOUS at 10:07

## 2017-03-28 RX ADMIN — DEXTROSE MONOHYDRATE 25 G: 25 INJECTION, SOLUTION INTRAVENOUS at 10:05

## 2017-03-28 RX ADMIN — PROCHLORPERAZINE MALEATE 10 MG: 10 TABLET, FILM COATED ORAL at 17:03

## 2017-03-28 RX ADMIN — ONDANSETRON HYDROCHLORIDE 8 MG: 4 TABLET, FILM COATED ORAL at 08:16

## 2017-03-28 RX ADMIN — HYDROMORPHONE HYDROCHLORIDE 1 MG: 1 INJECTION, SOLUTION INTRAMUSCULAR; INTRAVENOUS; SUBCUTANEOUS at 04:10

## 2017-03-28 RX ADMIN — PROCHLORPERAZINE MALEATE 10 MG: 10 TABLET, FILM COATED ORAL at 13:00

## 2017-03-28 RX ADMIN — SODIUM BICARBONATE 650 MG TABLET 650 MG: at 21:19

## 2017-03-28 RX ADMIN — INSULIN LISPRO 10 UNITS: 100 INJECTION, SOLUTION INTRAVENOUS; SUBCUTANEOUS at 10:04

## 2017-03-28 RX ADMIN — CALCIUM GLUCONATE 1 G: 94 INJECTION, SOLUTION INTRAVENOUS at 11:29

## 2017-03-28 RX ADMIN — AMPICILLIN SODIUM AND SULBACTAM SODIUM 3 G: 2; 1 INJECTION, POWDER, FOR SOLUTION INTRAMUSCULAR; INTRAVENOUS at 06:39

## 2017-03-28 RX ADMIN — HYDROMORPHONE HYDROCHLORIDE 1 MG: 1 INJECTION, SOLUTION INTRAMUSCULAR; INTRAVENOUS; SUBCUTANEOUS at 13:08

## 2017-03-28 RX ADMIN — CARVEDILOL 6.25 MG: 3.12 TABLET, FILM COATED ORAL at 17:03

## 2017-03-28 RX ADMIN — ONDANSETRON HYDROCHLORIDE 8 MG: 4 TABLET, FILM COATED ORAL at 21:19

## 2017-03-28 RX ADMIN — Medication 100 MG: at 08:16

## 2017-03-28 RX ADMIN — PROCHLORPERAZINE MALEATE 10 MG: 10 TABLET, FILM COATED ORAL at 09:00

## 2017-03-28 RX ADMIN — SODIUM POLYSTYRENE SULFONATE 15 G: 15 SUSPENSION ORAL; RECTAL at 10:07

## 2017-03-28 RX ADMIN — AMPICILLIN SODIUM AND SULBACTAM SODIUM 3 G: 2; 1 INJECTION, POWDER, FOR SOLUTION INTRAMUSCULAR; INTRAVENOUS at 21:19

## 2017-03-28 RX ADMIN — ALBUTEROL SULFATE 2.5 MG: 2.5 SOLUTION RESPIRATORY (INHALATION) at 10:42

## 2017-03-28 RX ADMIN — VITAM B12 100 MCG: 100 TAB at 08:16

## 2017-03-28 RX ADMIN — PROCHLORPERAZINE MALEATE 10 MG: 10 TABLET, FILM COATED ORAL at 21:19

## 2017-03-28 RX ADMIN — HYDROMORPHONE HYDROCHLORIDE 1 MG: 1 INJECTION, SOLUTION INTRAMUSCULAR; INTRAVENOUS; SUBCUTANEOUS at 18:04

## 2017-03-28 RX ADMIN — HYDROMORPHONE HYDROCHLORIDE 1 MG: 1 INJECTION, SOLUTION INTRAMUSCULAR; INTRAVENOUS; SUBCUTANEOUS at 01:41

## 2017-03-28 RX ADMIN — HYDROMORPHONE HYDROCHLORIDE 1 MG: 1 INJECTION, SOLUTION INTRAMUSCULAR; INTRAVENOUS; SUBCUTANEOUS at 21:19

## 2017-03-28 RX ADMIN — FOLIC ACID 1 MG: 1 TABLET ORAL at 08:16

## 2017-03-28 RX ADMIN — AMPICILLIN SODIUM AND SULBACTAM SODIUM 3 G: 2; 1 INJECTION, POWDER, FOR SOLUTION INTRAMUSCULAR; INTRAVENOUS at 13:09

## 2017-03-28 RX ADMIN — HYDROMORPHONE HYDROCHLORIDE 1 MG: 1 INJECTION, SOLUTION INTRAMUSCULAR; INTRAVENOUS; SUBCUTANEOUS at 15:57

## 2017-03-28 RX ADMIN — DOXEPIN HYDROCHLORIDE 50 MG: 50 CAPSULE ORAL at 21:19

## 2017-03-28 RX ADMIN — Medication 10 ML: at 21:19

## 2017-03-28 RX ADMIN — HYDROMORPHONE HYDROCHLORIDE 1 MG: 1 INJECTION, SOLUTION INTRAMUSCULAR; INTRAVENOUS; SUBCUTANEOUS at 06:40

## 2017-03-28 RX ADMIN — CARVEDILOL 6.25 MG: 3.12 TABLET, FILM COATED ORAL at 08:16

## 2017-03-28 RX ADMIN — Medication 10 ML: at 08:17

## 2017-03-28 RX ADMIN — MEGESTROL ACETATE 200 MG: 40 SUSPENSION ORAL at 08:14

## 2017-03-28 RX ADMIN — AMLODIPINE BESYLATE 10 MG: 5 TABLET ORAL at 08:16

## 2017-03-28 NOTE — PROGRESS NOTES
Shift summary: Pt A&Ox4, up ad augusto. Ambulating the hallways. Voiding per urinal. Hip and shoulder pain managed with IV Dilaudid. Pt appears to be resting comfortably. No visible signs of distress. Patient Vitals for the past 12 hrs:   Temp Pulse Resp BP SpO2   03/28/17 0825 98.5 °F (36.9 °C) 95 18 137/62 100 %   03/28/17 0357 98.7 °F (37.1 °C) 87 18 173/89 98 %   03/27/17 2348 98.7 °F (37.1 °C) 91 18 151/71 98 %   03/27/17 2041 99.5 °F (37.5 °C) 88 20 149/85 100 %         Date 03/27/17 0700 - 03/28/17 0659 03/28/17 0700 - 03/29/17 0659   Shift 7546-1691 4434-2038 24 Hour Total 5846-5702 3105-8153 24 Hour Total   I  N  T  A  K  E   P.O.          P. O.        I.V.  (mL/kg/hr)  200  (0.2) 200  (0.1)         Volume (ampicillin-sulbactam (UNASYN) 3 g in 0.9% sodium chloride (MBP/ADV) 100 mL MBP)  200 200       Shift Total  (mL/kg) 480  (6.5) 900  (12.2) 1380  (18.7)      O  U  T  P  U  T   Urine  (mL/kg/hr) 350  (0.4) 1350  (1.5) 1700  (1)         Urine Voided 350 1350 1700       Shift Total  (mL/kg) 350  (4.8) 1350  (18.3) 1700  (23)       -450 -320      Weight (kg) 73.5 73.9 73.9 73.9 73.9 73.9

## 2017-03-28 NOTE — ADT AUTH CERT NOTES
Renal Failure, Acute - Care Day 8 (3/23/2017) by Stephanie Ryan RN        Review Status Review Entered       Completed 3/23/2017       Details              Care Day: 8 Care Date: 3/23/2017 Level of Care: Telemetry       Guideline Day 4        Level Of Care       (X) Floor to discharge              Clinical Status       (X) * Mental and respiratory status at baseline or acceptable for next level of care       (X) * No active comorbid conditions requiring inpatient care       (X) * Electrolyte abnormalities absent or acceptable for next level of care       (X) * Acid-base abnormalities absent       ( ) * Volume status at baseline or acceptable for next level of care       (X) * Diet tolerated       (X) * Dialysis not needed, or access and plan established       ( ) * Discharge plans and education understood              Activity       (X) * Ambulatory [I]              Routes       (X) * Oral hydration, medications, and diet       3/23/2017 1:40 PM EDT by [facility]         norvasc po, coreg po, vit b12 po, sinequan po, folvite po, mengace po, morphine iv, zofran po, zosyn iv q6, compazine po, vit b6, vanco iv, mag sulfate iv              (X) Renal diet as tolerated       (X) Possible calorie count, nutritional assessment              Interventions       (X) Monitor electrolytes, renal function tests, acid-base, and volume status       3/23/2017 1:40 PM EDT by [facility]         WBC 22.3, RBC 2.94, HGB 8.5, HCT 24.4, RDW 18.2, NEUTRO 84, LYMPH 8, CHLORIDE 111, CO2 20, , BUN 29, CREAT 2.58, CA 8, MAG 1.7              (X) Dietary and fluid management counseling              Medications       (X) Possible medical therapies              3/23/2017 1:40 PM EDT by [facility]       Subject: Additional Clinical Information       97.7, HR 79, RR  18, 154/83, 100%RA. CT 3/22:  1.  Postoperative changes from recent right buttock soft tissue abscess incision  and drainage, without evidence of residual fluid collection or deeper  intramuscular or intrapelvic fluid collection. No evidence of involvement of the  right hip arthroplasty. 2. Right total hip arthroplasty present in near-anatomic position without  evidence of periprosthetic fracture. No distended joint capsule to suggest an  effusion. 3. Patchy osteosclerosis with left femoral intertrochanteric and subtrochanteric  bone infarcts in keeping with the patient's underlying hemoglobinopathy. 4. Circumferential bladder wall thickening, a nonspecific finding.  Correlation  for urinary tract infection may be helpful.                                   * Milestone
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
## 2017-03-28 NOTE — WOUND CARE
Wound care in to see patient per consult. Patient has a surgical wound to the right buttocks. Aquacell AG and a mepilex border were applied. Recommend patient to follow up in wound care post discharge from the hospital. Dr Allison Forrester is aware.
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
## 2017-03-28 NOTE — ROUTINE PROCESS
Bedside and Verbal shift change report given to EVERETTE Suazo (oncoming nurse) by Jessi Tompkins   (offgoing nurse). Report included the following information SBAR, Kardex, Intake/Output, MAR, Recent Results and Med Rec Status.

## 2017-03-28 NOTE — PROGRESS NOTES
0945  K+ critical at 6.8. Dr. Rissa Aguilar notified. Orders in. Shift Summary  Pain managed by medication. Pt. Denies nausea. Pt. Had a shower today. Dressing changed.

## 2017-03-28 NOTE — PROGRESS NOTES
Hospitalist Progress Note-critical care note     Patient: Pito Portillo MRN: [id number]  [facility]: [id number]    YOB: 1960  Age: 55 y.o. 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 ([facility] F19.94    MDD (major depressive disorder) F32.9    Sickle cell crisis ([facility] D57.00    EDGAR (acute kidney injury) ([facility] N17.9    Dehydration E86.0    Drug abuse F19.10    Acute hyperkalemia E87.5    Narcotic overdose T40.601A    Hyponatremia E87.1    Abscess of buttock, right L02.31    Pain of right hip joint M25.551    Bilateral pleural effusion J90    CKD (chronic kidney disease) stage 3, GFR 30-59 ml/min N18.3       1.acute hyperkalemia; Albuterol/insulin-shift, lasix and kayexlate -remove K ,ekg and ca gluconate .  -K Q6hr monitor ,cardiac monitor   -case discussed with Dr. Isaac Burt   2. CKD 3 cr was 2.39 on 12/19/2016   mild elevated   3 Sickle cell anemia with crisis. LDH wnl, resolved,   4. narcotic overdose  5 alcohol abuse; on CIWA protocol. Doing well, stopped ciwa protoc   6. soft tissue abscess rt buttock  On unysn   - I &d per  Dr. Oz Weir , appreciated.  Will continue local wound care ,   -will dc with po abx for 10 days   7 hip pain   No fracture from xray, ct pelvic performed, no active joint infection noted, appreciated ortho on board   8 ,bilateral effusion   off nc O2 now.  echo ordered, not performed, will reschedule if clinical sob   9 sepsis   Due to abscess, resolving, wbc 17 , improving   10 sickle cell anemia   Received transfusion for procedure , resolved   11 hypomagensemia   Mg replace     Will hold d/c for today due to hyperkalemia     Subjective: feel fine      Nurse: K was high      35 total min's spent on patient care including >50% on counseling/coordinating care. Discussed the above assessments. also discussed labs, medications and hospital course      Review of systems:    General: No fevers or chills. Cardiovascular: No chest pain or pressure. No palpitations. Pulmonary: No shortness of breath. Gastrointestinal: No nausea, vomiting. Vital signs/Intake and Output:  Visit Vitals    /62    Pulse 95    Temp 98.5 °F (36.9 °C)    Resp 18    Ht 5' 5\" (1.651 m)    Wt 73.9 kg (162 lb 14.4 oz)    SpO2 98%    BMI 27.11 kg/m2     Current Shift:  03/28 0701 - 03/28 1900  In: 240 [P.O.:240]  Out: 9641 [MZAUA:3270]  Last three shifts:  03/26 1901 - 03/28 0700  In: 1480 [P.O.:1180; I.V.:300]  Out: 2000 [Urine:2000]    Physical Exam:  General: WD, WN. Alert, cooperative, no acute distress    HEENT: NC, Atraumatic. PERRLA, anicteric sclerae. Lungs: CTA Bilaterally. No Wheezing/Rhonchi/Rales. Heart:  Regular  rhythm,  No murmur, No Rubs, No Gallops  Abdomen: Soft, Non distended, Non tender.  +Bowel sounds,   Extremities: No c/c/e  Psych:   Not anxious or agitated. Neurologic:  No acute neurological deficit.      Skin: wound covered with dressing ,mild drainage noted         Labs: Results:       Chemistry Recent Labs      03/28/17   1000  03/28/17   0848  03/27/17   0524  03/26/17   0544   GLU  127*   --   100*  96   NA  137   --   138  139   K  5.4  6.8*  5.7*  5.9*   CL  106   --   108  110*   CO2  21   --   20*  21   BUN  33*   --   30*  27*   CREA  2.68*   --   2.63*  2.38*   CA  8.2*   --   8.2*  8.2*   AGAP  10   --   10  8   BUCR  12   --   11*  11*   AP   --    --   56   --    TP   --    --   6.1*   --    ALB   --    --   2.0*   --    GLOB   --    -- 4.1*   --    AGRAT   --    --   0.5*   --       CBC w/Diff Recent Labs      03/28/17   0848  03/27/17   0524  03/26/17   0544   WBC  16.0*  17.1*  18.9*   RBC  2.66*  2.76*  2.87*   HGB  7.7*  7.8*  8.2*   HCT  22.9*  23.8*  24.8*   PLT  411  372  361   GRANS  70  71  75*   LYMPH  16*  17*  13*   EOS  5  4  3      Cardiac Enzymes No results for input(s): CPK, CKND1, STEFAN in the last 72 hours. No lab exists for component: CKRMB, TROIP   Coagulation No results for input(s): PTP, INR, APTT in the last 72 hours. No lab exists for component: INREXT, INREXT    Lipid Panel No results found for: CHOL, CHOLPOCT, CHOLX, CHLST, CHOLV, [id number], HDL, LDL, NLDLCT, DLDL, LDLC, DLDLP, [id number], VLDLC, VLDL, TGL, TGLX, TRIGL, [id number], TRIGP, TGLPOCT, [id number], CHHD, CHHDX   BNP No results for input(s): BNPP in the last 72 hours.    Liver Enzymes Recent Labs      03/27/17   0524   TP  6.1*   ALB  2.0*   AP  56   SGOT  15      Thyroid Studies Lab Results   Component Value Date/Time    TSH 1.08 03/19/2017 06:05 AM        Procedures/imaging: see electronic medical records for all procedures/Xrays and details which were not copied into this note but were reviewed prior to creation of Anibal Forman MD

## 2017-03-28 NOTE — PROGRESS NOTES
NUTRITION FOLLOW-UP    RECOMMENDATIONS/PLAN:   - Recommend decrease Ensure Enlive to once daily with Suplena supplements BID   *Noted recent hyperkalemia and MD orders for Ensure Enlive x6/day which provides 3360 mg potassium altogether  - Continue Regular diet    NUTRITION ASSESSMENT:   Client Update: 55 yrs old Male with sickle cell crisis (resolved), narcotic overdose, soft tissue abscess to buttocks, hip pain, and recently with acute hyperkalemia (6.8 mmol/L) s/p kayexalate. Note MD orders for Ensure Enlive TID x2 which provides excessive potassium for pt's renal function. FOOD/NUTRITION INTAKE   Diet Order:  Regular   Supplements: Ensure Enlive x2 TID   Food Allergies: NKFA  Average PO Intake:      Patient Vitals for the past 100 hrs:   % Diet Eaten   03/28/17 0842 100 %   03/27/17 1230 90 %   03/27/17 1055 25 %   03/26/17 1225 75 %   03/26/17 0917 50 %   03/26/17 0334 100 %   03/25/17 1812 100 %   03/25/17 1300 75 %   03/24/17 1640 75 %   Pertinent Medications:  [x] Reviewed; vitamin B12, B6, abx, folate, compazine, megace, dilaudid, trazodone, doxepin  Insulin:  []SSI  []Pre-meal   []Basal    []Drip  [x]None  Cultural/Yazidi Food Preferences: None Identified ANTHROPOMETRICS  Height: 5' 5\" (165.1 cm)       Weight: 73.9 kg (162 lb 14.4 oz)         BMI: 27.1 kg/m^2 overweight (25.0%-29.9% BMI)   Adm Weight: 160.5 lbs                Weight change: +2 lbs    NUTRITION-FOCUSED PHYSICAL ASSESSMENT  Skin: abscess/incision to R hip      GI: last BM 3/26, no N/V    BIOCHEMICAL DATA & MEDICAL TESTS  Pertinent Labs:  [x] Reviewed; BUN 33, Hgb 7.7, Hct 22.9, Ca 8.2      NUTRITION PRESCRIPTION  Calories: 1649-4386 kcal/day based on 25-30 kcal/kg  Protein: 58-73 g/day based on 0.8-1 g/kg  CHO: 228 g/day based on 50% of total energy  Fluid: 6076-4086 ml/day based on 1 kcal/ml      NUTRITION DIAGNOSES:   1.  At risk of inadequate oral intake related to sickle cell crisis as evidenced by PO intake 57% of meals and c/o nausea. - improved  2. Excessive potassium intake related to CKD stage 3 as evidenced by K 6.8 this AM  - new    NUTRITION INTERVENTIONS:   INTERVENTIONS:        GOALS:  1. Decrease Enlive to every day, add Suplena BID 1. >50% PO intake of meals/ONS, maintain body weight, K WNL by next review 3-5 days     LEARNING NEEDS (Diet, Supplementation, Food/Nutrient-Drug Interaction):   [x] None Identified   [] Education provided/documented      Identified and patient: [] Declined   [] Was not appropriate/indicated        NUTRITION MONITORING /EVALUATION:   Follow PO intake  Monitor wt  Monitor renal labs, electrolytes, fluid status  Monitor for additional supplement needs     Previous Recommendations Implemented: yes        Previous Goals Met:  yes      [] Participated in Interdisciplinary Rounds    [x] Interdisciplinary Care Plan Reviewed  DISCHARGE NUTRITION RECOMMENDATIONS ADDRESSED:     [x] Yes- recommended regular diet     NUTRITION RISK:           [x] At risk                        [] Not currently at risk        Will follow-up per policy.   Henok Ghosh RD  PAGER:  [phone]

## 2017-03-28 NOTE — PROGRESS NOTES
Progress Note    Patient: Guillermo Preciado MRN: [id number]  CSN: [id number]    YOB: 1960  Age: 55 y.o. Sex: male    DOA: 3/16/2017 LOS:  LOS: 11 days                    Subjective:     Right hip pain is better    Objective:      Visit Vitals    /62    Pulse 95    Temp 98.5 °F (36.9 °C)    Resp 18    Ht 5' 5\" (1.651 m)    Wt 73.9 kg (162 lb 14.4 oz)    SpO2 100%    BMI 27.11 kg/m2       Physical Exam:  The drain is removed, wound is clean, some drainage    Intake and Output:  Current Shift:  03/28 0701 - 03/28 1900  In: 240 [P.O.:240]  Out: 350 [Urine:350]  Last three shifts:  03/26 1901 - 03/28 0700  In: 1480 [P.O.:1180; I.V.:300]  Out: 2000 [Urine:2000]    Labs: Results:       Chemistry Recent Labs      03/28/17   0848  03/27/17   0524 03/26/17   0544   GLU   --   100*  96   NA   --   138  139   K  6.8*  5.7*  5.9*   CL   --   108  110*   CO2   --   20*  21   BUN   --   30*  27*   CREA   --   2.63*  2.38*   CA   --   8.2*  8.2*   AGAP   --   10  8   BUCR   --   11*  11*   AP   --   56   --    TP   --   6.1*   --    ALB   --   2.0*   --    GLOB   --   4.1*   --    AGRAT   --   0.5*   --       CBC w/Diff Recent Labs      03/28/17 0848  03/27/17 0524 03/26/17   0544   WBC  16.0*  17.1*  18.9*   RBC  2.66*  2.76*  2.87*   HGB  7.7*  7.8*  8.2*   HCT  22.9*  23.8*  24.8*   PLT  411  372  361   GRANS  70  71  75*   LYMPH  16*  17*  13*   EOS  5  4  3      Cardiac Enzymes No results for input(s): CPK, CKND1, STEFAN in the last 72 hours. No lab exists for component: CKRMB, TROIP   Coagulation No results for input(s): PTP, INR, APTT in the last 72 hours. No lab exists for component: INREXT    Lipid Panel No results found for: CHOL, CHOLPOCT, CHOLX, CHLST, CHOLV, [id number], HDL, LDL, NLDLCT, DLDL, LDLC, DLDLP, 350380, VLDLC, VLDL, TGL, TGLX, TRIGL, [id number], TRIGP, TGLPOCT, [id number], CHHD, CHHDX   BNP No results for input(s): BNPP in the last 72 hours.    Liver Enzymes Recent Labs 03/27/17   0524   TP  6.1*   ALB  2.0*   AP  56   SGOT  15      Thyroid Studies Lab Results   Component Value Date/Time    TSH 1.08 03/19/2017 06:05 AM            Medications Reviewed      Assessment/Plan     Principal Problem:    Acute hyperkalemia (3/16/2017)    Active Problems:    Sickle cell anemia (HCC) (11/4/2014)      Sickle [facility] crisis ([facility] (11/20/2016)      EDGAR (acute kidney injury) ([facility] (11/20/2016)      Narcotic overdose (3/17/2017)      Hyponatremia (3/17/2017)      Abscess of buttock, right (3/20/2017)      Pain of right hip joint (3/20/2017)      Bilateral pleural effusion (3/20/2017)      CKD (chronic kidney disease) stage 3, GFR 30-59 ml/min (3/21/2017)        Will get wound care involved

## 2017-03-28 NOTE — ROUTINE PROCESS
Bedside and Verbal shift change report given to Viet Tidwell RN (oncoming nurse) by EVERETTE Cheng RN (offgoing nurse). Report included the following information SBAR, Kardex, Intake/Output and MAR.

## 2017-03-29 VITALS
HEIGHT: 65 IN | WEIGHT: 161.82 LBS | HEART RATE: 85 BPM | BODY MASS INDEX: 26.96 KG/M2 | TEMPERATURE: 98.5 F | RESPIRATION RATE: 16 BRPM | OXYGEN SATURATION: 100 % | SYSTOLIC BLOOD PRESSURE: 150 MMHG | DIASTOLIC BLOOD PRESSURE: 76 MMHG

## 2017-03-29 LAB
LDH SERPL L TO P-CCNC: 162 U/L (ref 81–234)
MAGNESIUM SERPL-MCNC: 1.8 MG/DL (ref 1.8–2.4)
POTASSIUM SERPL-SCNC: 5.3 MMOL/L (ref 3.5–5.5)
POTASSIUM SERPL-SCNC: 5.6 MMOL/L (ref 3.5–5.5)

## 2017-03-29 PROCEDURE — 84132 ASSAY OF SERUM POTASSIUM: CPT | Performed by: HOSPITALIST

## 2017-03-29 PROCEDURE — 74011250636 HC RX REV CODE- 250/636: Performed by: FAMILY MEDICINE

## 2017-03-29 PROCEDURE — 74011250636 HC RX REV CODE- 250/636: Performed by: INTERNAL MEDICINE

## 2017-03-29 PROCEDURE — 74011250637 HC RX REV CODE- 250/637: Performed by: INTERNAL MEDICINE

## 2017-03-29 PROCEDURE — 83735 ASSAY OF MAGNESIUM: CPT | Performed by: SURGERY

## 2017-03-29 PROCEDURE — 74011250637 HC RX REV CODE- 250/637: Performed by: FAMILY MEDICINE

## 2017-03-29 PROCEDURE — 74011000258 HC RX REV CODE- 258: Performed by: INTERNAL MEDICINE

## 2017-03-29 PROCEDURE — 74011250637 HC RX REV CODE- 250/637: Performed by: HOSPITALIST

## 2017-03-29 PROCEDURE — 83615 LACTATE (LD) (LDH) ENZYME: CPT | Performed by: SURGERY

## 2017-03-29 PROCEDURE — 36415 COLL VENOUS BLD VENIPUNCTURE: CPT | Performed by: SURGERY

## 2017-03-29 PROCEDURE — 86160 COMPLEMENT ANTIGEN: CPT | Performed by: FAMILY MEDICINE

## 2017-03-29 RX ORDER — OXYCODONE AND ACETAMINOPHEN 7.5; 325 MG/1; MG/1
1 TABLET ORAL
Qty: 10 TAB | Refills: 0 | Status: ON HOLD | OUTPATIENT
Start: 2017-03-29 | End: 2017-11-28

## 2017-03-29 RX ORDER — OXYCODONE AND ACETAMINOPHEN 7.5; 325 MG/1; MG/1
1 TABLET ORAL
Status: DISCONTINUED | OUTPATIENT
Start: 2017-03-29 | End: 2017-03-29 | Stop reason: HOSPADM

## 2017-03-29 RX ORDER — SODIUM POLYSTYRENE SULFONATE 15 G/60ML
15 SUSPENSION ORAL; RECTAL
Status: COMPLETED | OUTPATIENT
Start: 2017-03-29 | End: 2017-03-29

## 2017-03-29 RX ORDER — AMOXICILLIN AND CLAVULANATE POTASSIUM 875; 125 MG/1; MG/1
1 TABLET, FILM COATED ORAL 2 TIMES DAILY
Qty: 20 TAB | Refills: 0 | Status: SHIPPED | OUTPATIENT
Start: 2017-03-29 | End: 2017-04-08

## 2017-03-29 RX ORDER — IBUPROFEN 200 MG
1 TABLET ORAL DAILY
Qty: 30 PATCH | Refills: 0 | Status: SHIPPED | OUTPATIENT
Start: 2017-03-29 | End: 2017-04-28

## 2017-03-29 RX ADMIN — HYDROMORPHONE HYDROCHLORIDE 1 MG: 1 INJECTION, SOLUTION INTRAMUSCULAR; INTRAVENOUS; SUBCUTANEOUS at 00:10

## 2017-03-29 RX ADMIN — HYDROMORPHONE HYDROCHLORIDE 1 MG: 1 INJECTION, SOLUTION INTRAMUSCULAR; INTRAVENOUS; SUBCUTANEOUS at 08:26

## 2017-03-29 RX ADMIN — HYDROMORPHONE HYDROCHLORIDE 1 MG: 1 INJECTION, SOLUTION INTRAMUSCULAR; INTRAVENOUS; SUBCUTANEOUS at 04:12

## 2017-03-29 RX ADMIN — MEGESTROL ACETATE 200 MG: 40 SUSPENSION ORAL at 08:25

## 2017-03-29 RX ADMIN — CARVEDILOL 6.25 MG: 3.12 TABLET, FILM COATED ORAL at 08:26

## 2017-03-29 RX ADMIN — PROCHLORPERAZINE MALEATE 10 MG: 10 TABLET, FILM COATED ORAL at 08:26

## 2017-03-29 RX ADMIN — ONDANSETRON HYDROCHLORIDE 8 MG: 4 TABLET, FILM COATED ORAL at 08:26

## 2017-03-29 RX ADMIN — OXYCODONE HYDROCHLORIDE AND ACETAMINOPHEN 1 TABLET: 7.5; 325 TABLET ORAL at 10:54

## 2017-03-29 RX ADMIN — AMLODIPINE BESYLATE 10 MG: 5 TABLET ORAL at 08:26

## 2017-03-29 RX ADMIN — AMPICILLIN SODIUM AND SULBACTAM SODIUM 3 G: 2; 1 INJECTION, POWDER, FOR SOLUTION INTRAMUSCULAR; INTRAVENOUS at 06:20

## 2017-03-29 RX ADMIN — SODIUM BICARBONATE 650 MG TABLET 650 MG: at 08:26

## 2017-03-29 RX ADMIN — HYDROMORPHONE HYDROCHLORIDE 1 MG: 1 INJECTION, SOLUTION INTRAMUSCULAR; INTRAVENOUS; SUBCUTANEOUS at 06:20

## 2017-03-29 RX ADMIN — FOLIC ACID 1 MG: 1 TABLET ORAL at 08:26

## 2017-03-29 RX ADMIN — VITAM B12 100 MCG: 100 TAB at 08:26

## 2017-03-29 RX ADMIN — HYDROMORPHONE HYDROCHLORIDE 1 MG: 1 INJECTION, SOLUTION INTRAMUSCULAR; INTRAVENOUS; SUBCUTANEOUS at 02:08

## 2017-03-29 RX ADMIN — SODIUM POLYSTYRENE SULFONATE 15 G: 15 SUSPENSION ORAL; RECTAL at 10:35

## 2017-03-29 RX ADMIN — Medication 100 MG: at 08:26

## 2017-03-29 NOTE — ROUTINE PROCESS
Bedside and Verbal shift change report given to EVERETTE Brooks (oncoming nurse) by Caleb Lara   (offgoing nurse). Report included the following information SBAR, Kardex, Intake/Output, MAR, Recent Results and Med Rec Status.

## 2017-03-29 NOTE — PROGRESS NOTES
Shift summary: Pt A&Ox4, up ad augusto. Ambulating in the hallways. Pain managed with IV Dilaudid. Voiding per urinal. Appears to be resting comfortably. No visible signs of distress. Patient Vitals for the past 12 hrs:   Temp Pulse Resp BP SpO2   03/29/17 0404 98.3 °F (36.8 °C) 86 18 149/80 100 %   03/28/17 2308 98.6 °F (37 °C) 86 18 149/86 96 %       Date 03/28/17 0700 - 03/29/17 0659 03/29/17 0700 - 03/30/17 0659   Shift 5444-8903 4901-1975 24 Hour Total 4974-7057 0425-2507 24 Hour Total   I  N  T  A  K  E   P.O. 480 1180 1660         P. O. 480 1180 1660       I.V.  (mL/kg/hr)  200 200         Volume (ampicillin-sulbactam (UNASYN) 3 g in 0.9% sodium chloride (MBP/ADV) 100 mL MBP)  200 200       Shift Total  (mL/kg) 480  (6.5) 1380  (18.8) 1860  (25.3)      O  U  T  P  U  T   Urine  (mL/kg/hr) 1575  (1.8) 1400 2975         Urine Voided 1575 1400 2975       Shift Total  (mL/kg) 1575  (21.3) 1400  (19.1) 2975  (40.5)      NET -1095 -20 -1115      Weight (kg) 73.9 73.4 73.4 73.4 73.4 73.4

## 2017-03-29 NOTE — DISCHARGE INSTRUCTIONS
DISCHARGE SUMMARY from Nurse    The following personal items are in your possession at time of discharge:    Dental Appliances: None  Visual Aid: None     Home Medications: None  Jewelry: None  Clothing: Other (comment) (Pt is homeless, came with large bag of clothing)  Other Valuables: Cell Phone             PATIENT INSTRUCTIONS:    After general anesthesia or intravenous sedation, for 24 hours or while taking prescription Narcotics:  · Limit your activities  · Do not drive and operate hazardous machinery  · Do not make important personal or business decisions  · Do  not drink alcoholic beverages  · If you have not urinated within 8 hours after discharge, please contact your surgeon on call. Report the following to your surgeon:  · Excessive pain, swelling, redness or odor of or around the surgical area  · Temperature over 100.5  · Nausea and vomiting lasting longer than 4 hours or if unable to take medications  · Any signs of decreased circulation or nerve impairment to extremity: change in color, persistent  numbness, tingling, coldness or increase pain  · Any questions        What to do at Home:  Recommended activity: Activity as tolerated and No lifting objects greater than 10-15 pounds, Driving while on pain medicine, or Strenuous exercise until follow up with PCP    If you experience any of the following symptoms increased pain, fever, shortness of breath, please follow up with PCP. *  Please give a list of your current medications to your Primary Care Provider. *  Please update this list whenever your medications are discontinued, doses are      changed, or new medications (including over-the-counter products) are added. *  Please carry medication information at all times in case of emergency situations.           These are general instructions for a healthy lifestyle:    No smoking/ No tobacco products/ Avoid exposure to second hand smoke    Surgeon General's Warning:  Quitting smoking now greatly reduces serious risk to your health. Obesity, smoking, and sedentary lifestyle greatly increases your risk for illness    A healthy diet, regular physical exercise & weight monitoring are important for maintaining a healthy lifestyle    You may be retaining fluid if you have a history of heart failure or if you experience any of the following symptoms:  Weight gain of 3 pounds or more overnight or 5 pounds in a week, increased swelling in our hands or feet or shortness of breath while lying flat in bed. Please call your doctor as soon as you notice any of these symptoms; do not wait until your next office visit. Recognize signs and symptoms of STROKE:    F-face looks uneven    A-arms unable to move or move unevenly    S-speech slurred or non-existent    T-time-call 911 as soon as signs and symptoms begin-DO NOT go       Back to bed or wait to see if you get better-TIME IS BRAIN. Warning Signs of HEART ATTACK     Call 911 if you have these symptoms:   Chest discomfort. Most heart attacks involve discomfort in the center of the chest that lasts more than a few minutes, or that goes away and comes back. It can feel like uncomfortable pressure, squeezing, fullness, or pain.  Discomfort in other areas of the upper body. Symptoms can include pain or discomfort in one or both arms, the back, neck, jaw, or stomach.  Shortness of breath with or without chest discomfort.  Other signs may include breaking out in a cold sweat, nausea, or lightheadedness. Don't wait more than five minutes to call 911 - MINUTES MATTER! Fast action can save your life. Calling 911 is almost always the fastest way to get lifesaving treatment. Emergency Medical Services staff can begin treatment when they arrive -- up to an hour sooner than if someone gets to the hospital by car. The discharge information has been reviewed with the patient. The patient verbalized understanding.     Discharge medications reviewed with the patient and appropriate educational materials and side effects teaching were provided.     Patient armband removed and shredded

## 2017-03-29 NOTE — PROGRESS NOTES
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 His GFR has been progressively declining. Not clear what is his Hep C status. Will need C3 and C4. He denies any dysuria. His free light chains are elevated 2nd to CKD. Needs to be on low k diet  Need to see me at office next week sometime        Subjective:   PT does not have any somatic complaints        Review of Systems  Negative for Edema, SOB, Chest pain, Tremors, Nausea, vomiting        Blood pressure 150/76, pulse 85, temperature 98.5 °F (36.9 °C), resp. rate 16, height 5' 5\" (1.651 m), weight 73.4 kg (161 lb 13.1 oz), SpO2 100 %. Well developed   Eyes: anicteric  Ears, nose  Neck: supple  Respiratory: good effort, no rales, good breath sounds, no wheezings  Cardiovascular: Normal rate, regular rythem normal S1 S2 no rubs or gallops  GI: soft, nontender, Nomral bowel sound  Musculoskeletal: No clubbing cynosis, no petechia  Skin: no rash, lesion or ulcers  Neruoligcal: no focal dificit grossly  Psychicatric: Fair judgment and insights, good memory. No dwayne affect.  Non anxiouse.          Intake/Output Summary (Last 24 hours) at 03/29/17 1034  Last data filed at 03/29/17 0858   Gross per 24 hour   Intake             2100 ml   Output             2925 ml   Net             -825 ml      Recent Labs      03/28/17   0848   WBC  16.0*     Lab Results   Component Value Date/Time    Sodium 137 03/28/2017 10:00 AM    Potassium 5.3 03/29/2017 09:05 AM    Chloride 106 03/28/2017 10:00 AM    CO2 21 03/28/2017 10:00 AM    Anion gap 10 03/28/2017 10:00 AM    Glucose 127 03/28/2017 10:00 AM    BUN 33 03/28/2017 10:00 AM    Creatinine 2.68 03/28/2017 10:00 AM    BUN/Creatinine ratio 12 03/28/2017 10:00 AM    GFR est AA 30 03/28/2017 10:00 AM    GFR est non-AA 25 03/28/2017 10:00 AM    Calcium 8.2 03/28/2017 10:00 AM        Current Facility-Administered Medications   Medication Dose Route Frequency Provider Last Rate Last Dose    sodium polystyrene (KAYEXALATE) 15 gram/60 mL oral suspension 15 g  15 g Oral NOW Savita Villarreal MD        sodium bicarbonate tablet 650 mg  650 mg Oral BID Aaliyah Jeanne Iqbal, DO   650 mg at 03/29/17 0826    HYDROmorphone (PF) (DILAUDID) injection 1 mg  1 mg IntraVENous Q2H PRN Sonal Guillen MD   1 mg at 03/29/17 0826    ampicillin-sulbactam (UNASYN) 3 g in 0.9% sodium chloride (MBP/ADV) 100 mL MBP  3 g IntraVENous Q8H [place]  mL/hr at 03/29/17 0620 3 g at 03/29/17 4211    0.9% sodium chloride infusion 250 mL  250 mL IntraVENous PRN Savita Villarreal MD        ondansetron hcl [facility]) tablet 8 mg  8 mg Oral BID Abhay Fitzgerald MD   8 mg at 03/29/17 6717    prochlorperazine (COMPAZINE) tablet 10 mg  10 mg Oral QID Abhay Fitzgerald MD   10 mg at 03/29/17 0826    megestrol (MEGACE) 400 mg/10 mL (10 mL) oral suspension 200 mg  200 mg Oral DAILY Abhay Fitzgerald MD   200 mg at 03/29/17 0825    acetaminophen (TYLENOL) tablet 650 mg  650 mg Oral Q6H PRN Sonal Guillen MD   650 mg at 03/25/17 0333    albuterol-ipratropium (DUO-NEB) 2.5 MG-0.5 MG/3 ML  3 mL Nebulization Q6H PRN Sonal Guillen MD   3 mL at 03/19/17 2026    amLODIPine (NORVASC) tablet 10 mg  10 mg Oral DAILY Sonal Guillen MD   10 mg at 03/29/17 0826    carvedilol (COREG) tablet 6.25 mg  6.25 mg Oral BID WITH MEALS Sonal Guillen MD   6.25 mg at 03/29/17 1917    cyanocobalamin (VITAMIN B12) tablet 100 mcg  100 mcg Oral DAILY Sonal Guillen MD   100 mcg at 03/29/17 0826    doxepin (SINEquan) capsule 50 mg  50 mg Oral QHS Brandi Munoz MD   50 mg at 59/20/89 0627    folic acid (FOLVITE) tablet 1 mg  1 mg Oral DAILY Brandi Munoz MD   1 mg at 03/29/17 9105    pyridoxine (vitamin B6) (VITAMIN B-6) tablet 100 mg  100 mg Oral DAILY Brandi Munoz MD   100 mg at 03/29/17 0826    traZODone (DESYREL) tablet 50 mg  50 mg Oral QHS PRN Brandi Munoz MD   50 mg at 03/26/17 2239    diphenhydrAMINE (BENADRYL) injection 12.5 mg  12.5 mg IntraVENous Q4H PRN Brandi Munoz MD        0.9% sodium chloride infusion 250 mL  250 mL IntraVENous PRN Brandi Munoz MD        sodium chloride (NS) flush 5-10 mL  5-10 mL IntraVENous Q8H Brandi Munoz MD   10 mL at 03/28/17 2119    sodium chloride (NS) flush 5-10 mL  5-10 mL IntraVENous PRN Brandi Munoz MD        LORazepam (ATIVAN) tablet 1 mg  1 mg Oral Q1H PRN Brandi Munoz MD        Or    LORazepam (ATIVAN) injection 1 mg  1 mg IntraVENous Q1H PRN Brandi Munoz MD        LORazepam (ATIVAN) tablet 2 mg  2 mg Oral Q1H PRN Brandi Munoz MD        Or    LORazepam (ATIVAN) injection 2 mg  2 mg IntraVENous Q1H PRN Brandi Munoz MD        LORazepam (ATIVAN) injection 3 mg  3 mg IntraVENous Q15MIN PRN Brandi Munoz MD        nicotine (NICODERM CQ) 21 mg/24 hr patch 1 Patch  1 Patch TransDERmal DAILY Brandi Munoz MD   1 Patch at 03/28/17 6617       )Xr Hip Rt W Or Wo Pelv 2-3 Vws    Result Date: 3/20/2017  Hip Two Views Indication: Right hip pain, sickle cell crisis. Technique: AP pelvis and frogleg lateral views of the right hip. Comparison studies: 01/20/2017, 11/06/2014. Findings: Hard to rotation degraded examination. The patient is status post total right hip arthroplasty with satisfactory position of the acetabular and femoral components. Stable positioning of the 2 acetabular iliac bone fixation screws. No findings of periprosthetic fracture or dislocation. Degenerative changes of the left hip joint with joint space narrowing and subchondral cyst formation.  The soft tissues appear within normal limits. IMPRESSION: 1. Status post total right hip arthroplasty without findings of fracture or dislocation. 2. Left hip DJD. Ct Pelv Wo Cont    Result Date: 3/22/2017  Examination: CT pelvis without contrast. HISTORY: 73-year-old patient with sickle cell disease status post recent incision and drainage of a right buttock abscess. Evaluation for potential fluid collection adjacent to the patient's indwelling right hip arthroplasty is requested. TECHNIQUE: CT imaging of the pelvis was performed in the axial plane utilizing both bone and soft tissue reconstruction algorithms. Additional techniques for minimizing artifact from the indwelling metallic implant were utilized. Images are obtained without contrast. Coronal and sagittal reformatted images were performed with the technologist and are included in interpretation. One or more dose reduction techniques were used on this CT: automated exposure control, adjustment of the mAs and/or kVp according to patient's size, and iterative reconstruction techniques. The specific techniques utilized on this CT exam have been documented in the patient's electronic medical record. . COMPARISON: Right hip radiographs 2/13/2017, ultrasound of the right buttock soft tissues of the same date. FINDINGS: There are postoperative changes from recent soft tissue abscess incision and drainage, with a soft tissue drain noted superficial to the right gluteus maximum muscle belly. There is a mild amount of adjacent subcutaneous fat infiltration, without evidence of discrete persistent rim-enhancing collection. There is no evidence of a deeper intramuscular or intrapelvic fluid collection that in any way involves the right hip arthroplasty. The right hip prosthesis itself is present in near-anatomic position. No evidence of periprosthetic fracture.  Heterotopic ossification is noted anterior and superior to the supra-acetabular ilium, as well as adjacent to the lesser trochanter and greater trochanter. No evidence of periprosthetic fracture. No evidence of a distended joint capsule to suggest joint effusion. Overall patchy osteosclerosis is noted in the lower lumbar spine and bones of the pelvis, with serpiginous areas of sclerotic density present in the intertrochanteric and subtrochanteric regions of the left femur, in keeping with underlying bone infarcts. There is mild to moderate left hip joint osteoarthritis with considerable subchondral cystic change noted in the weightbearing portions of the acetabulum. Included soft tissues of the lower abdomen and pelvis demonstrate no focal abnormality. Diverticulosis is present about evidence of diverticulitis. Considerable thickening of the urinary bladder is noted. Dependent body wall edema noted, greatest along the flanks. IMPRESSION: 1. Postoperative changes from recent right buttock soft tissue abscess incision and drainage, without evidence of residual fluid collection or deeper intramuscular or intrapelvic fluid collection. No evidence of involvement of the right hip arthroplasty. 2. Right total hip arthroplasty present in near-anatomic position without evidence of periprosthetic fracture. No distended joint capsule to suggest an effusion. 3. 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 4. Circumferential bladder wall thickening, a nonspecific finding. Correlation for urinary tract infection may be helpful. Xr Chest Port    Result Date: 3/19/2017  EXAM:  XR CHEST PORT INDICATION:  shortness of breath COMPARISON:  3/16/2017 FINDINGS: A portable AP radiograph of the chest was obtained at 2037 hours. The patient is on a cardiac monitor. The heart size appears normal. There are small bilateral pleural effusions. There is patchy perihilar opacity with slight increased prominence of the interstitial markings. There is no pneumothorax. There is no acute osseous abnormality. There is chronic probably posttraumatic deformity of the right shoulder. IMPRESSION: Bilateral pleural effusions with perihilar and interstitial opacities are likely on the basis of interstitial pulmonary edema. Xr Chest Port    Result Date: 3/17/2017  Chest, single view Indication: Cough Comparison: 11/19/2016 Findings:  Portable upright AP view of the chest was obtained. The cardiomediastinal silhouette is within normal limits. The pulmonary vasculature is unremarkable. Lung parenchyma is well aerated, without focal consolidation. No pleural effusion nor pneumothorax. Chronic appearing probable fracture deformity throughout the right humeral head, incompletely evaluated but relatively unchanged compared to multiple prior radiographs. No acute osseous abnormality. Impression: No radiographic evidence of an acute abnormality. Xr Hips Bi W Ap Pelv    Result Date: 1/21/2017  Right Hip, 2 Views Clinical History:  Sickle cell crisis with pain to bilateral hips. Comparison:  Several, most recent November 6, 2014. Findings: AP view of the pelvis as well as  frog leg lateral views of the hips are submitted for evaluation. There is an intact appearing right hip arthroplasty. There is osteopenia. There are degenerative changes of the left hip with joint space narrowing and subchondral cystic change. No acute fracture or dislocation. IMPRESSION: 1. No acute abnormality. Intact right hip arthroplasty. Degenerative changes of the left hip. Osteopenia. [place]    Result Date: 3/20/2017  Examination: Superficial soft tissue ultrasound. HISTORY: 55year-old patient with leukocytosis, buttock pain. Evaluation for possible abscess requested. TECHNIQUE: Real-time grayscale sonographic sonographic images of the right buttock over the area of maximal erythema was performed. Representative images were saved to PACS.  COMPARISON: Right hip radiographs 3/20/2017. FINDINGS: Subjacent to the area of erythema, there is considerable skin thickening and induration of subcutaneous fat demonstrated. Deep to these more superficial findings, there is a complex, mixed echogenicity fluid collection which measures approximately 5.1 x 3.3 x 6.3 cm in size. IMPRESSION: 1. Complex, mixed echogenicity fluid collection subjacent to the area of maximal erythema and warmth compatible with a soft tissue abscess. 2. Considerable skin thickening and subcutaneous fat reticulation likely pertaining to an associated cellulitis.

## 2017-03-29 NOTE — ROUTINE PROCESS
Bedside and Verbal shift change report given to BERTIN Horton RN (oncoming nurse) by Sawyer Iglesias RN (offgoing nurse). Report included the following information SBAR, Kardex, Intake/Output, MAR and Recent Results.
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
## 2017-03-29 NOTE — PROGRESS NOTES
Melva Oropeza made aware of Potassium 5.6    [facility] [place] [facility] member present in pt's room. Pt needs note that it will be difficult to walk stairs and is requesting note. Spoke with Dr. Martín Oropeza. Dr. Martín Oropeza stated that they can't sign. Spoke to  who verbalized that it has been sent to ortho and is awaiting signature from     91 21 06- AVS reviewed with BERTIN Hunter RN

## 2017-03-29 NOTE — PROGRESS NOTES
Pt agreed to accept home health referral and identified [facility] as his preferred provider. CM notified CMS who made referral to [facility]. CM later met with Marianne Cheung who came to hospital to coordinate referral. CMS pursued obtaining letter from Dr. Marianne Hussein which CM faxed to pt's landlady Ms. Tere Irizarry to try to expedite pt getting a downstairs apartment due to his mobility issues. Pt stated his [employer] worker would provide his transport home today.

## 2017-03-29 NOTE — PROGRESS NOTES
NUTRITION UPDATE    ** Do not recommend Ensure Enlive more than once daily due to high potassium content **    - Suggest liberalizing diet to Regular Low Potassium to manage hyperkalemia while allowing more dietary options. - Changed ONS to Suplena BID and Ensure Enlive only once per day      Will continue to follow per policy.   Estefania Vega, RD  PAGER:  [phone]

## 2017-03-29 NOTE — ROUTINE PROCESS
Bedside and Verbal shift change report given to ENID Buck (oncoming nurse) by EVERETTE Montenegro RN (offgoing nurse). Report included the following information SBAR, Kardex, Intake/Output and MAR.

## 2017-03-29 NOTE — DISCHARGE SUMMARY
Discharge Summary    Patient: Zina Mccloud MRN: [id number]  CSN: [id number]    YOB: 1960  Age: 55 y.o. Sex: male    DOA: 3/16/2017 LOS:  LOS: 12 days   Discharge Date:      Primary Care Provider:  Lindsay Willis MD    Admission Diagnoses: Acute hyperkalemia  PERIANAL ABSCESS    Discharge Diagnoses:    Patient Active Problem List   Diagnosis Code    Avascular necrosis of bone of right hip (HCC) M87.051    Sickle cell anemia (HCC) D57.1    ETOH abuse F10.10    Chronic hepatitis C (HCC) B18.2    Avascular necrosis of hip ([facility] M87.059    Dislocation of hip joint prosthesis ([facility] T84.029A, Z96.649    Suicidal ideation R45.851    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 ([facility] F19.94    MDD (major depressive disorder) F32.9    Sickle cell crisis ([facility] D57.00    EDGAR (acute kidney injury) ([facility] N17.9    Dehydration E86.0    Drug abuse F19.10    Acute hyperkalemia E87.5    Narcotic overdose T40.601A    Hyponatremia E87.1    Abscess of buttock, right L02.31    Pain of right hip joint M25.551    Bilateral pleural effusion J90    CKD (chronic kidney disease) stage 3, GFR 30-59 ml/min N18.3       Discharge Condition: Stable    Discharge Medications:     Current Discharge Medication List      START taking these medications    Details   nicotine (NICODERM CQ) 21 mg/24 hr 1 Patch by TransDERmal route daily for 30 days. Qty: 30 Patch, Refills: 0      oxyCODONE-acetaminophen (PERCOCET 7.5) 7.5-325 mg per tablet Take 1 Tab by mouth every six (6) hours as needed. Max Daily Amount: 4 Tabs. Qty: 10 Tab, Refills: 0      amoxicillin-clavulanate (AUGMENTIN) 875-125 mg per tablet Take 1 Tab by mouth two (2) times a day for 10 days. Qty: 20 Tab, Refills: 0         CONTINUE these medications which have NOT CHANGED    Details   sodium bicarbonate 650 mg tablet Take 650 mg by mouth two (2) times a day.       sertraline (ZOLOFT) 100 mg tablet Take 1 Tab by mouth daily. Indications: ANXIETY WITH DEPRESSION, substance induced depression  Qty: 30 Tab, Refills: 0      carvedilol (COREG) 6.25 mg tablet Take 1 Tab by mouth two (2) times daily (with meals). Qty: 60 Tab, Refills: 0      amLODIPine (NORVASC) 5 mg tablet Take 2 Tabs by mouth daily. Qty: 60 Tab, Refills: 0      doxepin (SINEQUAN) 50 mg capsule Take 1 Cap by mouth nightly. Indications: Depression  Qty: 30 Cap, Refills: 0      cyanocobalamin (VITAMIN B-12) 100 mcg tablet Take 1 Tab by mouth daily. Qty: 30 Tab, Refills: 0      folic acid (FOLVITE) 1 mg tablet Take 1 Tab by mouth daily. Qty: 30 Tab, Refills: 2      pyridoxine, vitamin B6, (VITAMIN B-6) 100 mg tablet Take 1 Tab by mouth daily. Qty: 30 Tab, Refills: 0      traZODone (DESYREL) 50 mg tablet Take 1 Tab by mouth nightly as needed for Sleep. Indications: sleep  Qty: 30 Tab, Refills: 3         STOP taking these medications       cephALEXin (KEFLEX) 500 mg capsule Comments:   Reason for Stopping:         oxyCODONE-acetaminophen (PERCOCET 10)  mg per tablet Comments:   Reason for Stopping:               Procedures : I & D     Consults: General Surgery, Infectious Disease and Nephrology      PHYSICAL EXAM   Visit Vitals    /76 (BP 1 Location: Right arm, BP Patient Position: At rest)    Pulse 85    Temp 98.5 °F (36.9 °C)    Resp 16    Ht 5' 5\" (1.651 m)    Wt 73.4 kg (161 lb 13.1 oz)    SpO2 100%    BMI 26.93 kg/m2     General: Awake, cooperative, no acute distress    HEENT: NC, Atraumatic. PERRLA, EOMI. Anicteric sclerae. Lungs:  CTA Bilaterally. No Wheezing/Rhonchi/Rales. Heart:  Regular  rhythm,  +murmur, No Rubs, No Gallops  Abdomen: Soft, Non distended, Non tender. +Bowel sounds,   Extremities: No c/c/e  Psych:   Not anxious or agitated. Neurologic:  No acute neurological deficits.                                      Admission HPI :   George Mtz is a 55 y.o. male with hx of sickle cell anemia who presents [facility] the emergency department from the homeless shelter via EMS for altered mental status including slurred speech and pinpoint pupils. Pt reports he smoked cigarettes at the shelter took 2 Percocets 10 mg before going to the shelter. At the shelter, he was screened at by their medical services and found that the patient was acting incoherent; he had slurred speech and dilated pupils. Pt states he has chronic pain in his shoulders, hips, back, and buttocks which is similar to his sickle cell pain. Pt endorses EtoH and coccaine use. Hx is limited by the condition of the patient. On arrival to the ED he continues to be lethargic. , H/H was 6.1/17.7, WBC of 27.7. K of 5.8 and creatinine of 2.80. Fluids administered and given narcan which was beneficial. Given insulin, dextrose and kayexalate for hyperkalemia.       Hospital Course :   Since he was admitted, iv hydration was given for sickle cells pain crisis. He was closely observed for narcotics overdose . He denies any SI and HI. On admission, he presented leukocytosis with pain in Rt hip. X-ray no acute fracture. Ultrasound of Rt buttock indicated abscess. He further presented fever and worsening leukocytosis-sepsis picture. Surgery was on board with I & D performed in OR. Ct hip no infection noted-ortho was consulted. On admission, he had hyperkalemia with hx of ckd3. Hyperkalemia resolved per breathing /insulin and kayexalate. It recurrent during his stay. Dr. Mark Padilla was consulted. He received gluconate calcium ,lasix and kayexalte and hyperkalemia resolved. He was recommended to have low K diet and follow up with Dr. Mark Padilla. He remained hemodynamic stable, he was discharged with oral abx for another 10 days per ID recommended. He received blood transfusion for procedure.      Activity: Activity as tolerated    Diet: Regular Diet low k diet     Follow-up: Dr. Kirsten Guzmán, Dr. Mark Padilla, [facility] and wound clinic     Disposition: home with home health    Minutes spent on discharge: 60 min Labs: Results:       Chemistry Recent Labs      03/29/17   0905  03/29/17   0350  03/28/17 2009 03/28/17   1000   03/27/17   0524   GLU   --    --    --    --   127*   --   100*   NA   --    --    --    --   137   --   138   K  5.3  5.6*  5.8*   < >  5.4   < >  5.7*   CL   --    --    --    --   106   --   108   CO2   --    --    --    --   21   --   20*   BUN   --    --    --    --   33*   --   30*   CREA   --    --    --    --   2.68*   --   2.63*   CA   --    --    --    --   8.2*   --   8.2*   AGAP   --    --    --    --   10   --   10   BUCR   --    --    --    --   12   --   11*   AP   --    --    --    --    --    --   56   TP   --    --    --    --    --    --   6.1*   ALB   --    --    --    --    --    --   2.0*   GLOB   --    --    --    --    --    --   4.1*   AGRAT   --    --    --    --    --    --   0.5*    < > = values in this interval not displayed. CBC w/Diff Recent Labs      03/28/17 0848  03/27/17 0524   WBC  16.0*  17.1*   RBC  2.66*  2.76*   HGB  7.7*  7.8*   HCT  22.9*  23.8*   PLT  411  372   GRANS  70  71   LYMPH  16*  17*   EOS  5  4      Cardiac Enzymes No results for input(s): CPK, CKND1, STEFAN in the last 72 hours. No lab exists for component: CKRMB, TROIP   Coagulation No results for input(s): PTP, INR, APTT in the last 72 hours. No lab exists for component: INREXT    Lipid Panel No results found for: CHOL, CHOLPOCT, CHOLX, CHLST, CHOLV, [id number], HDL, LDL, NLDLCT, DLDL, LDLC, DLDLP, [id number], VLDLC, VLDL, TGL, TGLX, TRIGL, [id number], TRIGP, TGLPOCT, [id number], CHHD, CHHDX   BNP No results for input(s): BNPP in the last 72 hours. Liver Enzymes Recent Labs      03/27/17   0524   TP  6.1*   ALB  2.0*   AP  56   SGOT  15      Thyroid Studies Lab Results   Component Value Date/Time    TSH 1.08 03/19/2017 06:05 AM            Significant Diagnostic Studies: Xr Hip Rt W Or Wo Pelv 2-3 Vws    Result Date: 3/20/2017  Hip Two Views Indication: Right hip pain, sickle cell crisis. Technique: AP pelvis and frogleg lateral views of the right hip. Comparison studies: 01/20/2017, 11/06/2014. Findings: Hard to rotation degraded examination. The patient is status post total right hip arthroplasty with satisfactory position of the acetabular and femoral components. Stable positioning of the 2 acetabular iliac bone fixation screws. No findings of periprosthetic fracture or dislocation. Degenerative changes of the left hip joint with joint space narrowing and subchondral cyst formation. The soft tissues appear within normal limits. IMPRESSION: 1. Status post total right hip arthroplasty without findings of fracture or dislocation. 2. Left hip DJD. Ct Pelv Wo Cont    Result Date: 3/22/2017  Examination: CT pelvis without contrast. HISTORY: 71-year-old patient with sickle cell disease status post recent incision and drainage of a right buttock abscess. Evaluation for potential fluid collection adjacent to the patient's indwelling right hip arthroplasty is requested. TECHNIQUE: CT imaging of the pelvis was performed in the axial plane utilizing both bone and soft tissue reconstruction algorithms. Additional techniques for minimizing artifact from the indwelling metallic implant were utilized. Images are obtained without contrast. Coronal and sagittal reformatted images were performed with the technologist and are included in interpretation. One or more dose reduction techniques were used on this CT: automated exposure control, adjustment of the mAs and/or kVp according to patient's size, and iterative reconstruction techniques. The specific techniques utilized on this CT exam have been documented in the patient's electronic medical record. . COMPARISON: Right hip radiographs 2/13/2017, ultrasound of the right buttock soft tissues of the same date.  FINDINGS: There are postoperative changes from recent soft tissue abscess incision and drainage, with a soft tissue drain noted superficial to the right gluteus maximum muscle belly. There is a mild amount of adjacent subcutaneous fat infiltration, without evidence of discrete persistent rim-enhancing collection. There is no evidence of a deeper intramuscular or intrapelvic fluid collection that in any way involves the right hip arthroplasty. The right hip prosthesis itself is present in near-anatomic position. No evidence of periprosthetic fracture. Heterotopic ossification is noted anterior and superior to the supra-acetabular ilium, as well as adjacent to the lesser trochanter and greater trochanter. No evidence of periprosthetic fracture. No evidence of a distended joint capsule to suggest joint effusion. Overall patchy osteosclerosis is noted in the lower lumbar spine and bones of the pelvis, with serpiginous areas of sclerotic density present in the intertrochanteric and subtrochanteric regions of the left femur, in keeping with underlying bone infarcts. There is mild to moderate left hip joint osteoarthritis with considerable subchondral cystic change noted in the weightbearing portions of the acetabulum. Included soft tissues of the lower abdomen and pelvis demonstrate no focal abnormality. Diverticulosis is present about evidence of diverticulitis. Considerable thickening of the urinary bladder is noted. Dependent body wall edema noted, greatest along the flanks. IMPRESSION: 1. Postoperative changes from recent right buttock soft tissue abscess incision and drainage, without evidence of residual fluid collection or deeper intramuscular or intrapelvic fluid collection. No evidence of involvement of the right hip arthroplasty. 2. Right total hip arthroplasty present in near-anatomic position without evidence of periprosthetic fracture. No distended joint capsule to suggest an effusion. 3. 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  4. Circumferential bladder wall thickening, a nonspecific finding. Correlation for urinary tract infection may be helpful. Xr Chest Port    Result Date: 3/19/2017  EXAM:  XR CHEST PORT INDICATION:  shortness of breath COMPARISON:  3/16/2017 FINDINGS: A portable AP radiograph of the chest was obtained at 2037 hours. The patient is on a cardiac monitor. The heart size appears normal. There are small bilateral pleural effusions. There is patchy perihilar opacity with slight increased prominence of the interstitial markings. There is no pneumothorax. There is no acute osseous abnormality. There is chronic probably posttraumatic deformity of the right shoulder. IMPRESSION: Bilateral pleural effusions with perihilar and interstitial opacities are likely on the basis of interstitial pulmonary edema. Xr Chest Port    Result Date: 3/17/2017  Chest, single view Indication: Cough Comparison: 11/19/2016 Findings:  Portable upright AP view of the chest was obtained. The cardiomediastinal silhouette is within normal limits. The pulmonary vasculature is unremarkable. Lung parenchyma is well aerated, without focal consolidation. No pleural effusion nor pneumothorax. Chronic appearing probable fracture deformity throughout the right humeral head, incompletely evaluated but relatively unchanged compared to multiple prior radiographs. No acute osseous abnormality. Impression: No radiographic evidence of an acute abnormality. [place]    Result Date: 3/20/2017  Examination: Superficial soft tissue ultrasound. HISTORY: 55year-old patient with leukocytosis, buttock pain. Evaluation for possible abscess requested. TECHNIQUE: Real-time grayscale sonographic sonographic images of the right buttock over the area of maximal erythema was performed. Representative images were saved to PACS. COMPARISON: Right hip radiographs 3/20/2017.  FINDINGS: Subjacent to the area of erythema, there is considerable skin thickening and induration of subcutaneous fat demonstrated. Deep to these more superficial findings, there is a complex, mixed echogenicity fluid collection which measures approximately 5.1 x 3.3 x 6.3 cm in size. IMPRESSION: 1. Complex, mixed echogenicity fluid collection subjacent to the area of maximal erythema and warmth compatible with a soft tissue abscess. 2. Considerable skin thickening and subcutaneous fat reticulation likely pertaining to an associated cellulitis.             [facility]     CC: Phys Other, MD

## 2017-03-30 LAB
C3 SERPL-MCNC: 116 MG/DL (ref 82–167)
C4 SERPL-MCNC: 23 MG/DL (ref 14–44)

## 2017-03-31 NOTE — PROGRESS NOTES
[facility] 2[facility]30 p.m. Requesting Wound Care Orders for Dr. Stephen Vigil patient, Theresa Hayes who discharged home on 3/30/17. Per Gregory Peters, Care Manager, Nellie at [facility] was instructed to please contact Dr. Stephen Vigil office at [phone] to obtain the Wound Care Orders from Dr. Alonso Bains. Maria Esther informed to please contact Dr. Stephen Vigil office for Wound Care Orders. Danyell CMS.

## 2017-04-02 LAB
ATRIAL RATE: 85 BPM
CALCULATED P AXIS, ECG09: 69 DEGREES
CALCULATED R AXIS, ECG10: 65 DEGREES
CALCULATED T AXIS, ECG11: 30 DEGREES
DIAGNOSIS, 93000: NORMAL
P-R INTERVAL, ECG05: 114 MS
Q-T INTERVAL, ECG07: 370 MS
QRS DURATION, ECG06: 80 MS
QTC CALCULATION (BEZET), ECG08: 440 MS
VENTRICULAR RATE, ECG03: 85 BPM

## 2017-04-10 ENCOUNTER — HOSPITAL ENCOUNTER (EMERGENCY)
Age: 57
Discharge: OTHER INSTITUTION | End: 2017-04-11
Attending: EMERGENCY MEDICINE
Payer: COMMERCIAL

## 2017-04-10 DIAGNOSIS — N18.9 CRF (CHRONIC RENAL FAILURE), UNSPECIFIED STAGE: ICD-10-CM

## 2017-04-10 DIAGNOSIS — R45.851 SUICIDAL IDEATION: ICD-10-CM

## 2017-04-10 DIAGNOSIS — D57.1 HB-SS DISEASE WITHOUT CRISIS (HCC): ICD-10-CM

## 2017-04-10 DIAGNOSIS — E86.0 DEHYDRATION: ICD-10-CM

## 2017-04-10 DIAGNOSIS — F11.20 HEROIN ADDICTION (HCC): Primary | ICD-10-CM

## 2017-04-10 LAB
ALBUMIN SERPL BCP-MCNC: 3 G/DL (ref 3.4–5)
ALBUMIN/GLOB SERPL: 0.6 {RATIO} (ref 0.8–1.7)
ALP SERPL-CCNC: 81 U/L (ref 45–117)
ALT SERPL-CCNC: 22 U/L (ref 16–61)
ANION GAP BLD CALC-SCNC: 11 MMOL/L (ref 3–18)
ANION GAP BLD CALC-SCNC: 11 MMOL/L (ref 3–18)
AST SERPL W P-5'-P-CCNC: 38 U/L (ref 15–37)
BASOPHILS # BLD AUTO: 0 K/UL (ref 0–0.06)
BASOPHILS # BLD: 0 % (ref 0–2)
BILIRUB SERPL-MCNC: 0.7 MG/DL (ref 0.2–1)
BUN SERPL-MCNC: 28 MG/DL (ref 7–18)
BUN SERPL-MCNC: 29 MG/DL (ref 7–18)
BUN/CREAT SERPL: 10 (ref 12–20)
BUN/CREAT SERPL: 10 (ref 12–20)
CALCIUM SERPL-MCNC: 8.4 MG/DL (ref 8.5–10.1)
CALCIUM SERPL-MCNC: 8.7 MG/DL (ref 8.5–10.1)
CHLORIDE SERPL-SCNC: 106 MMOL/L (ref 100–108)
CHLORIDE SERPL-SCNC: 109 MMOL/L (ref 100–108)
CO2 SERPL-SCNC: 18 MMOL/L (ref 21–32)
CO2 SERPL-SCNC: 19 MMOL/L (ref 21–32)
CREAT SERPL-MCNC: 2.78 MG/DL (ref 0.6–1.3)
CREAT SERPL-MCNC: 2.95 MG/DL (ref 0.6–1.3)
DIFFERENTIAL METHOD BLD: ABNORMAL
EOSINOPHIL # BLD: 0.1 K/UL (ref 0–0.4)
EOSINOPHIL NFR BLD: 1 % (ref 0–5)
ERYTHROCYTE [DISTWIDTH] IN BLOOD BY AUTOMATED COUNT: 18.8 % (ref 11.6–14.5)
GLOBULIN SER CALC-MCNC: 5.4 G/DL (ref 2–4)
GLUCOSE SERPL-MCNC: 104 MG/DL (ref 74–99)
GLUCOSE SERPL-MCNC: 98 MG/DL (ref 74–99)
HCT VFR BLD AUTO: 25.8 % (ref 36–48)
HGB BLD-MCNC: 8.6 G/DL (ref 13–16)
LYMPHOCYTES # BLD AUTO: 14 % (ref 21–52)
LYMPHOCYTES # BLD: 1.3 K/UL (ref 0.9–3.6)
MCH RBC QN AUTO: 27.7 PG (ref 24–34)
MCHC RBC AUTO-ENTMCNC: 33.3 G/DL (ref 31–37)
MCV RBC AUTO: 83 FL (ref 74–97)
MONOCYTES # BLD: 0.6 K/UL (ref 0.05–1.2)
MONOCYTES NFR BLD AUTO: 7 % (ref 3–10)
NEUTS SEG # BLD: 7.5 K/UL (ref 1.8–8)
NEUTS SEG NFR BLD AUTO: 78 % (ref 40–73)
PLATELET # BLD AUTO: 291 K/UL (ref 135–420)
PMV BLD AUTO: 10.7 FL (ref 9.2–11.8)
POTASSIUM SERPL-SCNC: 5.1 MMOL/L (ref 3.5–5.5)
POTASSIUM SERPL-SCNC: 6 MMOL/L (ref 3.5–5.5)
PROT SERPL-MCNC: 8.4 G/DL (ref 6.4–8.2)
RBC # BLD AUTO: 3.11 M/UL (ref 4.7–5.5)
RETICS/RBC NFR AUTO: 4.7 % (ref 0.5–2.3)
SODIUM SERPL-SCNC: 136 MMOL/L (ref 136–145)
SODIUM SERPL-SCNC: 138 MMOL/L (ref 136–145)
WBC # BLD AUTO: 9.6 K/UL (ref 4.6–13.2)

## 2017-04-10 PROCEDURE — 96361 HYDRATE IV INFUSION ADD-ON: CPT

## 2017-04-10 PROCEDURE — 74011250637 HC RX REV CODE- 250/637: Performed by: PHYSICIAN ASSISTANT

## 2017-04-10 PROCEDURE — 80053 COMPREHEN METABOLIC PANEL: CPT

## 2017-04-10 PROCEDURE — 85025 COMPLETE CBC W/AUTO DIFF WBC: CPT

## 2017-04-10 PROCEDURE — 96374 THER/PROPH/DIAG INJ IV PUSH: CPT

## 2017-04-10 PROCEDURE — 96375 TX/PRO/DX INJ NEW DRUG ADDON: CPT

## 2017-04-10 PROCEDURE — 80048 BASIC METABOLIC PNL TOTAL CA: CPT | Performed by: EMERGENCY MEDICINE

## 2017-04-10 PROCEDURE — 74011250636 HC RX REV CODE- 250/636: Performed by: EMERGENCY MEDICINE

## 2017-04-10 PROCEDURE — 93005 ELECTROCARDIOGRAM TRACING: CPT

## 2017-04-10 PROCEDURE — 85045 AUTOMATED RETICULOCYTE COUNT: CPT

## 2017-04-10 PROCEDURE — 99285 EMERGENCY DEPT VISIT HI MDM: CPT

## 2017-04-10 PROCEDURE — 74011250636 HC RX REV CODE- 250/636: Performed by: PHYSICIAN ASSISTANT

## 2017-04-10 RX ORDER — OXYCODONE AND ACETAMINOPHEN 5; 325 MG/1; MG/1
1 TABLET ORAL
Status: COMPLETED | OUTPATIENT
Start: 2017-04-10 | End: 2017-04-10

## 2017-04-10 RX ORDER — MORPHINE SULFATE 2 MG/ML
2 INJECTION, SOLUTION INTRAMUSCULAR; INTRAVENOUS
Status: COMPLETED | OUTPATIENT
Start: 2017-04-10 | End: 2017-04-10

## 2017-04-10 RX ORDER — DEXTROSE 50 % IN WATER (D50W) INTRAVENOUS SYRINGE
25
Status: DISCONTINUED | OUTPATIENT
Start: 2017-04-10 | End: 2017-04-10

## 2017-04-10 RX ORDER — SODIUM POLYSTYRENE SULFONATE 15 G/60ML
45 SUSPENSION ORAL; RECTAL
Status: DISCONTINUED | OUTPATIENT
Start: 2017-04-10 | End: 2017-04-10

## 2017-04-10 RX ORDER — ONDANSETRON 2 MG/ML
4 INJECTION INTRAMUSCULAR; INTRAVENOUS
Status: COMPLETED | OUTPATIENT
Start: 2017-04-10 | End: 2017-04-10

## 2017-04-10 RX ORDER — SODIUM CHLORIDE 9 MG/ML
150 INJECTION, SOLUTION INTRAVENOUS CONTINUOUS
Status: DISCONTINUED | OUTPATIENT
Start: 2017-04-10 | End: 2017-04-10

## 2017-04-10 RX ORDER — CALCIUM GLUCONATE 94 MG/ML
1 INJECTION, SOLUTION INTRAVENOUS
Status: DISCONTINUED | OUTPATIENT
Start: 2017-04-10 | End: 2017-04-10

## 2017-04-10 RX ORDER — SODIUM BICARBONATE 1 MEQ/ML
50 SYRINGE (ML) INTRAVENOUS ONCE
Status: DISCONTINUED | OUTPATIENT
Start: 2017-04-10 | End: 2017-04-10

## 2017-04-10 RX ADMIN — SODIUM CHLORIDE 1000 ML: 900 INJECTION, SOLUTION INTRAVENOUS at 17:07

## 2017-04-10 RX ADMIN — ONDANSETRON 4 MG: 2 INJECTION INTRAMUSCULAR; INTRAVENOUS at 23:16

## 2017-04-10 RX ADMIN — Medication 2 MG: at 21:13

## 2017-04-10 RX ADMIN — OXYCODONE HYDROCHLORIDE AND ACETAMINOPHEN 1 TABLET: 5; 325 TABLET ORAL at 18:36

## 2017-04-10 NOTE — ED NOTES
Pt laying in bed with eyes closed when rn entered room, pt appears to be sleeping, opens eyes to name being called.  Pt states \"tell that doc those percocet's don't do no good I need something more\"

## 2017-04-10 NOTE — ED TRIAGE NOTES
Pt reports he is withdrawing from heroin and having pain all over from his sickle cell that flares when he is wuithdrawing. Pt also reports suicidal ideation.

## 2017-04-10 NOTE — ED NOTES
Bedside and Verbal shift change report given to EVERETTE Joyce  (oncoming nurse) by Jodie Iqbal RN   (offgoing nurse). Report included the following information SBAR, Kardex, Intake/Output, MAR and Recent Results.
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
## 2017-04-11 VITALS
OXYGEN SATURATION: 100 % | RESPIRATION RATE: 18 BRPM | DIASTOLIC BLOOD PRESSURE: 92 MMHG | HEART RATE: 90 BPM | TEMPERATURE: 98.7 F | SYSTOLIC BLOOD PRESSURE: 176 MMHG

## 2017-04-11 LAB
ATRIAL RATE: 79 BPM
CALCULATED P AXIS, ECG09: 38 DEGREES
CALCULATED R AXIS, ECG10: 60 DEGREES
CALCULATED T AXIS, ECG11: 40 DEGREES
DIAGNOSIS, 93000: NORMAL
P-R INTERVAL, ECG05: 126 MS
Q-T INTERVAL, ECG07: 394 MS
QRS DURATION, ECG06: 84 MS
QTC CALCULATION (BEZET), ECG08: 451 MS
VENTRICULAR RATE, ECG03: 79 BPM

## 2017-04-11 PROCEDURE — 74011250637 HC RX REV CODE- 250/637: Performed by: EMERGENCY MEDICINE

## 2017-04-11 PROCEDURE — 74011250636 HC RX REV CODE- 250/636: Performed by: FAMILY MEDICINE

## 2017-04-11 PROCEDURE — 74011250636 HC RX REV CODE- 250/636: Performed by: EMERGENCY MEDICINE

## 2017-04-11 RX ORDER — ONDANSETRON 4 MG/1
4 TABLET, ORALLY DISINTEGRATING ORAL
Status: COMPLETED | OUTPATIENT
Start: 2017-04-11 | End: 2017-04-11

## 2017-04-11 RX ORDER — HYDRALAZINE HYDROCHLORIDE 20 MG/ML
20 INJECTION INTRAMUSCULAR; INTRAVENOUS ONCE
Status: COMPLETED | OUTPATIENT
Start: 2017-04-11 | End: 2017-04-11

## 2017-04-11 RX ORDER — CLONIDINE 0.1 MG/24H
1 PATCH, EXTENDED RELEASE TRANSDERMAL
Status: DISCONTINUED | OUTPATIENT
Start: 2017-04-11 | End: 2017-04-11 | Stop reason: HOSPADM

## 2017-04-11 RX ORDER — HYDRALAZINE HYDROCHLORIDE 20 MG/ML
20 INJECTION INTRAMUSCULAR; INTRAVENOUS
Status: COMPLETED | OUTPATIENT
Start: 2017-04-11 | End: 2017-04-11

## 2017-04-11 RX ORDER — OXYCODONE AND ACETAMINOPHEN 5; 325 MG/1; MG/1
1 TABLET ORAL
Status: COMPLETED | OUTPATIENT
Start: 2017-04-11 | End: 2017-04-11

## 2017-04-11 RX ADMIN — HYDRALAZINE HYDROCHLORIDE 20 MG: 20 INJECTION INTRAMUSCULAR; INTRAVENOUS at 14:07

## 2017-04-11 RX ADMIN — OXYCODONE HYDROCHLORIDE AND ACETAMINOPHEN 1 TABLET: 5; 325 TABLET ORAL at 00:40

## 2017-04-11 RX ADMIN — HYDRALAZINE HYDROCHLORIDE 20 MG: 20 INJECTION INTRAMUSCULAR; INTRAVENOUS at 04:53

## 2017-04-11 RX ADMIN — ONDANSETRON 4 MG: 4 TABLET, ORALLY DISINTEGRATING ORAL at 07:52

## 2017-04-11 NOTE — ED NOTES
Spoke to [employer] at [facility] she states pt is accepted at the [facility] but can not go until 1500, Number to call report [phone]

## 2017-04-11 NOTE — ED NOTES
Report received. Care of pt assumed at this time. Pt resting with eyes closed. Resting in position of comfort. No needs expressed at this time.

## 2017-04-11 NOTE — ED NOTES
Patient again requesting pain medication. Advised that MD will not be ordering pain medication. Pt then states that he needs something for withdrawal. Advised that he has clonidine patch to help with that. Pt states that patch has been in place for 5 hours. Advised that patch is for 24 hour dose. Pt then requests nausea medication and breakfast as well and grape juice to drink. MD aware and orders received.

## 2017-04-11 NOTE — ED NOTES
Provided pt with grape juice. AA called to cafeteria to confirm that pt breakfast tray will be brought soon. Pt aware.

## 2017-04-11 NOTE — ED NOTES
Pt lying on stretcher, reports generalized pain at this time, no distress noted, Pt remains on monitor and call bell within reach, denies needing bathroom,  room safety check completed, informed of status, awaiting results, will continue to monitor.

## 2017-04-11 NOTE — ED NOTES
Patito Stout from crisis center updated regarding blood pressure and IV hydralazine administration, plan of care to continue transfer to crisis center.

## 2017-04-11 NOTE — ED NOTES
Patient continues 1:1 with ed staff. Juice provided, patient watching television, no other needs at this time.

## 2017-04-11 NOTE — ED NOTES
Natty Ibarra from [place] [address] states patient has been accepted at [address] [place] [address] and can be received after 3pm on 4/11.

## 2017-04-11 NOTE — ED NOTES
2:40 PM   Poison control read me, Hafsa Srivastava MD, a statement that withdraw from heroin effects BP in a transient and mild manor. Intervention is not usually indicated. If pt has persistent elevated BP, this is usually due to underlying hx of HTN. SCRIBE ATTESTATION STATEMENT  Documented by:Abhinav Carreon for and in the presence of reji. PROVIDER ATTESTATION STATEMENT  I personally performed the services described in the documentation, reviewed the documentation, as recorded by the scribe in my presence, and it accurately and completely records my words and actions.   Hafsa Srivastava MD.

## 2017-04-11 NOTE — ED NOTES
BP improved. Call bell in reach. Pt awakens and requests pain medication, MD aware and states that he is not ordering pain medication at this time.

## 2017-04-11 NOTE — ED NOTES
TRANSFER - OUT REPORT:    Verbal report given to Amanda Loree (name) on Chris Fields  being transferred to [facility] ([phone])([facility]) for routine progression of care       Report consisted of patients Situation, Background, Assessment and   Recommendations(SBAR). Information from the following report(s) SBAR, Kardex, ED Summary, Intake/Output, MAR, Recent Results and Med Rec Status was reviewed with the receiving nurse. Opportunity for questions and clarification was provided. Patient transported with:  Cynthia Zhou at 1430.

## 2017-04-11 NOTE — ED NOTES
Dr. Luis Miguel Richardson notified of pain and high blood pressure. Patient has not been taking his prescribed medications. Orders received. Patient continues 1:1 with sitter, normal sinus on monitor.

## 2017-04-11 NOTE — ED NOTES
Report received from DYLAN Hammond, pt laying in bed with sitter at bedside, voices no complaints or concerns, call light in reach. Will continue to monitor.

## 2017-04-11 NOTE — ED NOTES
Bedside and Verbal shift change report given to Alyse Mata RN (oncoming nurse) by Marianne Gunter RN   (offgoing nurse). Report included the following information SBAR, ED Summary, MAR and Recent Results.

## 2017-04-11 NOTE — ED NOTES
In room to medicate pt for htn. Pt asleep. Awakened pt to explain rationale for ordered medications. Pt requesting pain medications. Advised pt that MD would not be ordering pain medication at this time. Pt back to sleep. Sitter remains at bedside.

## 2017-11-26 ENCOUNTER — HOSPITAL ENCOUNTER (OUTPATIENT)
Age: 57
Setting detail: OBSERVATION
Discharge: PSYCHIATRIC OR SUBSTANCE USE FACILITY | DRG: 662 | End: 2017-11-28
Attending: EMERGENCY MEDICINE | Admitting: INTERNAL MEDICINE
Payer: MEDICAID

## 2017-11-26 ENCOUNTER — APPOINTMENT (OUTPATIENT)
Dept: GENERAL RADIOLOGY | Age: 57
DRG: 662 | End: 2017-11-26
Attending: EMERGENCY MEDICINE
Payer: MEDICAID

## 2017-11-26 DIAGNOSIS — I15.8 OTHER SECONDARY HYPERTENSION: ICD-10-CM

## 2017-11-26 DIAGNOSIS — N17.9 AKI (ACUTE KIDNEY INJURY) (HCC): ICD-10-CM

## 2017-11-26 DIAGNOSIS — D57.00 SICKLE CELL ANEMIA WITH PAIN (HCC): ICD-10-CM

## 2017-11-26 DIAGNOSIS — G89.4 CHRONIC PAIN SYNDROME: ICD-10-CM

## 2017-11-26 DIAGNOSIS — F11.10 HEROIN ABUSE (HCC): Primary | ICD-10-CM

## 2017-11-26 DIAGNOSIS — F14.10 COCAINE ABUSE (HCC): ICD-10-CM

## 2017-11-26 PROBLEM — R07.9 CHEST PAIN: Status: ACTIVE | Noted: 2017-11-26

## 2017-11-26 LAB
ALBUMIN SERPL-MCNC: 3.4 G/DL (ref 3.4–5)
ALBUMIN/GLOB SERPL: 0.8 {RATIO} (ref 0.8–1.7)
ALP SERPL-CCNC: 78 U/L (ref 45–117)
ALT SERPL-CCNC: 20 U/L (ref 16–61)
ANION GAP SERPL CALC-SCNC: 12 MMOL/L (ref 3–18)
APPEARANCE UR: CLEAR
AST SERPL-CCNC: 27 U/L (ref 15–37)
BACTERIA URNS QL MICRO: ABNORMAL /HPF
BASOPHILS # BLD: 0 K/UL (ref 0–0.06)
BASOPHILS NFR BLD: 0 % (ref 0–2)
BILIRUB SERPL-MCNC: 1.2 MG/DL (ref 0.2–1)
BILIRUB UR QL: NEGATIVE
BUN SERPL-MCNC: 52 MG/DL (ref 7–18)
BUN/CREAT SERPL: 13 (ref 12–20)
CALCIUM SERPL-MCNC: 8.6 MG/DL (ref 8.5–10.1)
CHLORIDE SERPL-SCNC: 110 MMOL/L (ref 100–108)
CK MB CFR SERPL CALC: 2.2 % (ref 0–4)
CK MB CFR SERPL CALC: 2.4 % (ref 0–4)
CK MB CFR SERPL CALC: NORMAL % (ref 0–4)
CK MB SERPL-MCNC: 1.3 NG/ML (ref 5–25)
CK MB SERPL-MCNC: 1.5 NG/ML (ref 5–25)
CK MB SERPL-MCNC: <1 NG/ML (ref 5–25)
CK SERPL-CCNC: 45 U/L (ref 39–308)
CK SERPL-CCNC: 55 U/L (ref 39–308)
CK SERPL-CCNC: 69 U/L (ref 39–308)
CO2 SERPL-SCNC: 16 MMOL/L (ref 21–32)
COLOR UR: YELLOW
CREAT SERPL-MCNC: 4.11 MG/DL (ref 0.6–1.3)
DIFFERENTIAL METHOD BLD: ABNORMAL
EOSINOPHIL # BLD: 0.2 K/UL (ref 0–0.4)
EOSINOPHIL NFR BLD: 2 % (ref 0–5)
EPITH CASTS URNS QL MICRO: NEGATIVE /LPF (ref 0–5)
ERYTHROCYTE [DISTWIDTH] IN BLOOD BY AUTOMATED COUNT: 17.6 % (ref 11.6–14.5)
GLOBULIN SER CALC-MCNC: 4.4 G/DL (ref 2–4)
GLUCOSE SERPL-MCNC: 94 MG/DL (ref 74–99)
GLUCOSE UR STRIP.AUTO-MCNC: NEGATIVE MG/DL
HCT VFR BLD AUTO: 19.8 % (ref 36–48)
HGB BLD-MCNC: 6.9 G/DL (ref 13–16)
HGB UR QL STRIP: ABNORMAL
KETONES UR QL STRIP.AUTO: NEGATIVE MG/DL
LDH SERPL L TO P-CCNC: 272 U/L (ref 81–234)
LEUKOCYTE ESTERASE UR QL STRIP.AUTO: NEGATIVE
LIPASE SERPL-CCNC: 115 U/L (ref 73–393)
LYMPHOCYTES # BLD: 1.9 K/UL (ref 0.9–3.6)
LYMPHOCYTES NFR BLD: 14 % (ref 21–52)
MAGNESIUM SERPL-MCNC: 2.2 MG/DL (ref 1.6–2.6)
MCH RBC QN AUTO: 29 PG (ref 24–34)
MCHC RBC AUTO-ENTMCNC: 34.8 G/DL (ref 31–37)
MCV RBC AUTO: 83.2 FL (ref 74–97)
MONOCYTES # BLD: 0.9 K/UL (ref 0.05–1.2)
MONOCYTES NFR BLD: 7 % (ref 3–10)
MUCOUS THREADS URNS QL MICRO: NEGATIVE /LPF
NEUTS SEG # BLD: 10.2 K/UL (ref 1.8–8)
NEUTS SEG NFR BLD: 77 % (ref 40–73)
NITRITE UR QL STRIP.AUTO: NEGATIVE
PH UR STRIP: 6 [PH] (ref 5–8)
PLATELET # BLD AUTO: 240 K/UL (ref 135–420)
PMV BLD AUTO: 10.2 FL (ref 9.2–11.8)
POTASSIUM SERPL-SCNC: 5.5 MMOL/L (ref 3.5–5.5)
PROT SERPL-MCNC: 7.8 G/DL (ref 6.4–8.2)
PROT UR STRIP-MCNC: 300 MG/DL
RBC # BLD AUTO: 2.38 M/UL (ref 4.7–5.5)
RBC #/AREA URNS HPF: ABNORMAL /HPF (ref 0–5)
RETICS/RBC NFR AUTO: 9 % (ref 0.5–2.3)
SODIUM SERPL-SCNC: 138 MMOL/L (ref 136–145)
SP GR UR REFRACTOMETRY: 1.01 (ref 1–1.03)
TROPONIN I SERPL-MCNC: <0.02 NG/ML (ref 0–0.06)
UROBILINOGEN UR QL STRIP.AUTO: 0.2 EU/DL (ref 0.2–1)
WBC # BLD AUTO: 13.2 K/UL (ref 4.6–13.2)
WBC URNS QL MICRO: ABNORMAL /HPF (ref 0–5)

## 2017-11-26 PROCEDURE — 99285 EMERGENCY DEPT VISIT HI MDM: CPT

## 2017-11-26 PROCEDURE — 80053 COMPREHEN METABOLIC PANEL: CPT | Performed by: EMERGENCY MEDICINE

## 2017-11-26 PROCEDURE — 36415 COLL VENOUS BLD VENIPUNCTURE: CPT | Performed by: INTERNAL MEDICINE

## 2017-11-26 PROCEDURE — 86920 COMPATIBILITY TEST SPIN: CPT | Performed by: INTERNAL MEDICINE

## 2017-11-26 PROCEDURE — 65270000029 HC RM PRIVATE

## 2017-11-26 PROCEDURE — 86900 BLOOD TYPING SEROLOGIC ABO: CPT | Performed by: INTERNAL MEDICINE

## 2017-11-26 PROCEDURE — 30233N1 TRANSFUSION OF NONAUTOLOGOUS RED BLOOD CELLS INTO PERIPHERAL VEIN, PERCUTANEOUS APPROACH: ICD-10-PCS | Performed by: INTERNAL MEDICINE

## 2017-11-26 PROCEDURE — 74011250637 HC RX REV CODE- 250/637: Performed by: INTERNAL MEDICINE

## 2017-11-26 PROCEDURE — 74011250636 HC RX REV CODE- 250/636: Performed by: EMERGENCY MEDICINE

## 2017-11-26 PROCEDURE — 96376 TX/PRO/DX INJ SAME DRUG ADON: CPT

## 2017-11-26 PROCEDURE — 36430 TRANSFUSION BLD/BLD COMPNT: CPT

## 2017-11-26 PROCEDURE — 74011000250 HC RX REV CODE- 250: Performed by: EMERGENCY MEDICINE

## 2017-11-26 PROCEDURE — 99218 HC RM OBSERVATION: CPT

## 2017-11-26 PROCEDURE — 96375 TX/PRO/DX INJ NEW DRUG ADDON: CPT

## 2017-11-26 PROCEDURE — 83735 ASSAY OF MAGNESIUM: CPT | Performed by: EMERGENCY MEDICINE

## 2017-11-26 PROCEDURE — 71020 XR CHEST PA LAT: CPT

## 2017-11-26 PROCEDURE — 96374 THER/PROPH/DIAG INJ IV PUSH: CPT

## 2017-11-26 PROCEDURE — 84484 ASSAY OF TROPONIN QUANT: CPT | Performed by: EMERGENCY MEDICINE

## 2017-11-26 PROCEDURE — P9016 RBC LEUKOCYTES REDUCED: HCPCS | Performed by: INTERNAL MEDICINE

## 2017-11-26 PROCEDURE — 83690 ASSAY OF LIPASE: CPT | Performed by: EMERGENCY MEDICINE

## 2017-11-26 PROCEDURE — 85025 COMPLETE CBC W/AUTO DIFF WBC: CPT | Performed by: EMERGENCY MEDICINE

## 2017-11-26 PROCEDURE — 83615 LACTATE (LD) (LDH) ENZYME: CPT | Performed by: HOSPITALIST

## 2017-11-26 PROCEDURE — 77030029684 HC NEB SM VOL KT MONA -A

## 2017-11-26 PROCEDURE — 96361 HYDRATE IV INFUSION ADD-ON: CPT

## 2017-11-26 PROCEDURE — 81001 URINALYSIS AUTO W/SCOPE: CPT | Performed by: EMERGENCY MEDICINE

## 2017-11-26 PROCEDURE — 93005 ELECTROCARDIOGRAM TRACING: CPT

## 2017-11-26 PROCEDURE — 74011250636 HC RX REV CODE- 250/636: Performed by: INTERNAL MEDICINE

## 2017-11-26 PROCEDURE — 85045 AUTOMATED RETICULOCYTE COUNT: CPT | Performed by: EMERGENCY MEDICINE

## 2017-11-26 PROCEDURE — 94640 AIRWAY INHALATION TREATMENT: CPT

## 2017-11-26 RX ORDER — CARVEDILOL 3.12 MG/1
6.25 TABLET ORAL 2 TIMES DAILY WITH MEALS
Status: DISCONTINUED | OUTPATIENT
Start: 2017-11-26 | End: 2017-11-28 | Stop reason: HOSPADM

## 2017-11-26 RX ORDER — DOXEPIN HYDROCHLORIDE 50 MG/1
50 CAPSULE ORAL
Status: DISCONTINUED | OUTPATIENT
Start: 2017-11-26 | End: 2017-11-28 | Stop reason: HOSPADM

## 2017-11-26 RX ORDER — SODIUM BICARBONATE 650 MG/1
650 TABLET ORAL 2 TIMES DAILY
Status: DISCONTINUED | OUTPATIENT
Start: 2017-11-26 | End: 2017-11-28 | Stop reason: HOSPADM

## 2017-11-26 RX ORDER — FOLIC ACID 1 MG/1
1 TABLET ORAL DAILY
Status: DISCONTINUED | OUTPATIENT
Start: 2017-11-26 | End: 2017-11-28 | Stop reason: HOSPADM

## 2017-11-26 RX ORDER — KETOROLAC TROMETHAMINE 30 MG/ML
30 INJECTION, SOLUTION INTRAMUSCULAR; INTRAVENOUS
Status: COMPLETED | OUTPATIENT
Start: 2017-11-26 | End: 2017-11-26

## 2017-11-26 RX ORDER — IPRATROPIUM BROMIDE AND ALBUTEROL SULFATE 2.5; .5 MG/3ML; MG/3ML
3 SOLUTION RESPIRATORY (INHALATION)
Status: COMPLETED | OUTPATIENT
Start: 2017-11-26 | End: 2017-11-26

## 2017-11-26 RX ORDER — OXYCODONE AND ACETAMINOPHEN 7.5; 325 MG/1; MG/1
1 TABLET ORAL
Status: DISCONTINUED | OUTPATIENT
Start: 2017-11-26 | End: 2017-11-28 | Stop reason: HOSPADM

## 2017-11-26 RX ORDER — BUTORPHANOL TARTRATE 2 MG/ML
1 INJECTION INTRAMUSCULAR; INTRAVENOUS ONCE
Status: COMPLETED | OUTPATIENT
Start: 2017-11-26 | End: 2017-11-26

## 2017-11-26 RX ORDER — SODIUM CHLORIDE 9 MG/ML
150 INJECTION, SOLUTION INTRAVENOUS CONTINUOUS
Status: DISCONTINUED | OUTPATIENT
Start: 2017-11-26 | End: 2017-11-28 | Stop reason: HOSPADM

## 2017-11-26 RX ORDER — MORPHINE SULFATE 2 MG/ML
2 INJECTION, SOLUTION INTRAMUSCULAR; INTRAVENOUS
Status: DISCONTINUED | OUTPATIENT
Start: 2017-11-26 | End: 2017-11-28 | Stop reason: HOSPADM

## 2017-11-26 RX ORDER — LABETALOL HYDROCHLORIDE 5 MG/ML
20 INJECTION, SOLUTION INTRAVENOUS
Status: COMPLETED | OUTPATIENT
Start: 2017-11-26 | End: 2017-11-26

## 2017-11-26 RX ORDER — AMLODIPINE BESYLATE 5 MG/1
10 TABLET ORAL DAILY
Status: DISCONTINUED | OUTPATIENT
Start: 2017-11-26 | End: 2017-11-28 | Stop reason: HOSPADM

## 2017-11-26 RX ORDER — SERTRALINE HYDROCHLORIDE 50 MG/1
100 TABLET, FILM COATED ORAL DAILY
Status: DISCONTINUED | OUTPATIENT
Start: 2017-11-26 | End: 2017-11-28

## 2017-11-26 RX ORDER — ONDANSETRON 2 MG/ML
4 INJECTION INTRAMUSCULAR; INTRAVENOUS
Status: DISCONTINUED | OUTPATIENT
Start: 2017-11-26 | End: 2017-11-28 | Stop reason: HOSPADM

## 2017-11-26 RX ORDER — TRAZODONE HYDROCHLORIDE 50 MG/1
50 TABLET ORAL
Status: DISCONTINUED | OUTPATIENT
Start: 2017-11-26 | End: 2017-11-28 | Stop reason: HOSPADM

## 2017-11-26 RX ORDER — SODIUM CHLORIDE 9 MG/ML
250 INJECTION, SOLUTION INTRAVENOUS AS NEEDED
Status: DISCONTINUED | OUTPATIENT
Start: 2017-11-26 | End: 2017-11-28 | Stop reason: HOSPADM

## 2017-11-26 RX ORDER — PYRIDOXINE HCL (VITAMIN B6) 100 MG
100 TABLET ORAL DAILY
Status: DISCONTINUED | OUTPATIENT
Start: 2017-11-26 | End: 2017-11-28 | Stop reason: HOSPADM

## 2017-11-26 RX ADMIN — MORPHINE SULFATE 2 MG: 2 INJECTION, SOLUTION INTRAMUSCULAR; INTRAVENOUS at 17:53

## 2017-11-26 RX ADMIN — SODIUM CHLORIDE 150 ML/HR: 900 INJECTION, SOLUTION INTRAVENOUS at 13:04

## 2017-11-26 RX ADMIN — LABETALOL HYDROCHLORIDE 20 MG: 5 INJECTION INTRAVENOUS at 02:16

## 2017-11-26 RX ADMIN — SODIUM BICARBONATE 650 MG TABLET 650 MG: at 08:19

## 2017-11-26 RX ADMIN — IPRATROPIUM BROMIDE AND ALBUTEROL SULFATE 3 ML: .5; 3 SOLUTION RESPIRATORY (INHALATION) at 01:08

## 2017-11-26 RX ADMIN — DOXEPIN HYDROCHLORIDE 50 MG: 50 CAPSULE ORAL at 22:24

## 2017-11-26 RX ADMIN — SERTRALINE HYDROCHLORIDE 100 MG: 50 TABLET ORAL at 08:19

## 2017-11-26 RX ADMIN — MORPHINE SULFATE 2 MG: 2 INJECTION, SOLUTION INTRAMUSCULAR; INTRAVENOUS at 12:00

## 2017-11-26 RX ADMIN — MORPHINE SULFATE 2 MG: 2 INJECTION, SOLUTION INTRAMUSCULAR; INTRAVENOUS at 22:23

## 2017-11-26 RX ADMIN — KETOROLAC TROMETHAMINE 30 MG: 30 INJECTION, SOLUTION INTRAMUSCULAR at 01:27

## 2017-11-26 RX ADMIN — SODIUM CHLORIDE 1000 ML: 900 INJECTION, SOLUTION INTRAVENOUS at 01:32

## 2017-11-26 RX ADMIN — Medication 100 MG: at 14:17

## 2017-11-26 RX ADMIN — MORPHINE SULFATE 2 MG: 2 INJECTION, SOLUTION INTRAMUSCULAR; INTRAVENOUS at 08:19

## 2017-11-26 RX ADMIN — AMLODIPINE BESYLATE 10 MG: 5 TABLET ORAL at 08:18

## 2017-11-26 RX ADMIN — CARVEDILOL 6.25 MG: 3.12 TABLET, FILM COATED ORAL at 17:53

## 2017-11-26 RX ADMIN — SODIUM BICARBONATE 650 MG TABLET 650 MG: at 22:24

## 2017-11-26 RX ADMIN — BUTORPHANOL TARTRATE 1 MG: 2 INJECTION, SOLUTION INTRAMUSCULAR; INTRAVENOUS at 02:15

## 2017-11-26 RX ADMIN — LABETALOL HYDROCHLORIDE 20 MG: 5 INJECTION INTRAVENOUS at 04:02

## 2017-11-26 RX ADMIN — MORPHINE SULFATE 2 MG: 2 INJECTION, SOLUTION INTRAMUSCULAR; INTRAVENOUS at 03:29

## 2017-11-26 RX ADMIN — FOLIC ACID 1 MG: 1 TABLET ORAL at 08:19

## 2017-11-26 RX ADMIN — CARVEDILOL 6.25 MG: 3.12 TABLET, FILM COATED ORAL at 08:19
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
## 2017-11-26 NOTE — PROGRESS NOTES
Problem: Falls - Risk of  Goal: *Absence of Falls  Document Rima Fall Risk and appropriate interventions in the flowsheet.    Outcome: Progressing Towards Goal  Fall Risk Interventions:            Medication Interventions: Evaluate medications/consider consulting pharmacy, Patient to call before getting OOB, Teach patient to arise slowly

## 2017-11-26 NOTE — PROGRESS NOTES
Chart Reviewed. Noted patient admitted to the hospital for further medical management. Care Management to follow up with patient and/or family for transition of care needs prn. Care Management Interventions  PCP Verified by CM:  Yes  Transition of Care Consult (CM Consult): Discharge Planning

## 2017-11-26 NOTE — PROGRESS NOTES
Pt was admitted per Dr. Earline Jules AM with sickle cell crisis. No chest pain/no hypoxia/cxr wnl-little change of acute chest. Reported shoulder pain, no chest pain now. Will continue hydration and ld monitoring.  Not following hematology
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
## 2017-11-26 NOTE — ED NOTES
Spoke with Dr Disha Ag re pt blood pressure, vo for 20mg labetelol one time prior to transport to the floor.   Pt updated on plan of care

## 2017-11-26 NOTE — ED TRIAGE NOTES
Brought by EMS for complaint of chest pain all day today \"it is mt sickle cell acting up, it always feels like this when it does\". Also states has been dizzy intermittently x 2 weeks and has had intermittent blood in urine x 1-2 weeks. \"I recently got out of custodial so I need to get set up in pain management again\". Sepsis Screening completed    (  )Patient meets SIRS criteria. ( xx )Patient does not meet SIRS criteria.       SIRS Criteria is achieved when two or more of the following are present   Temperature < 96.8°F (36°C) or > 100.9°F (38.3°C)   Heart Rate > 90 beats per minute   Respiratory Rate > 20 breaths per minute   WBC count > 12,000 or <4,000 or > 10% bands

## 2017-11-26 NOTE — PROGRESS NOTES
Nursing Admin: Chart Review. S BP throughout course of ED stay has been elevated with a range -200's . Trandate IVP given, with  minimal response. Advised primary receiving nurse to reach out to ED RN and obtain, full set VS to generate MEWS score, obtain  report and prepare for transport due to patient BP showing  trending improvement appropriately and plan for remote Tele with discussion of parameters for acceptable -180's post Beta blockade treatment.  Plan: Transfer ED to Med/Surg/Onc unit; tele remote accomodation

## 2017-11-27 PROBLEM — F32.A DEPRESSION: Status: ACTIVE | Noted: 2017-11-27

## 2017-11-27 LAB
ABO + RH BLD: NORMAL
ANTIGENS PRESENT RBC DONR: NORMAL
ANTIGENS PRESENT RBC DONR: NORMAL
BLD PROD TYP BPU: NORMAL
BLD PROD TYP BPU: NORMAL
BLOOD GROUP ANTIBODIES SERPL: NORMAL
BPU ID: NORMAL
BPU ID: NORMAL
CALLED TO:,BCALL1: NORMAL
CROSSMATCH RESULT,%XM: NORMAL
CROSSMATCH RESULT,%XM: NORMAL
LDH SERPL L TO P-CCNC: 266 U/L (ref 81–234)
PHYSICIAN INSTRUCTIO,%PI: NORMAL
SPECIMEN EXP DATE BLD: NORMAL
STATUS OF UNIT,%ST: NORMAL
STATUS OF UNIT,%ST: NORMAL
UNIT DIVISION, %UDIV: 0
UNIT DIVISION, %UDIV: 0

## 2017-11-27 PROCEDURE — 65270000029 HC RM PRIVATE

## 2017-11-27 PROCEDURE — 96361 HYDRATE IV INFUSION ADD-ON: CPT

## 2017-11-27 PROCEDURE — 36415 COLL VENOUS BLD VENIPUNCTURE: CPT | Performed by: HOSPITALIST

## 2017-11-27 PROCEDURE — 74011250637 HC RX REV CODE- 250/637: Performed by: INTERNAL MEDICINE

## 2017-11-27 PROCEDURE — 96376 TX/PRO/DX INJ SAME DRUG ADON: CPT

## 2017-11-27 PROCEDURE — 74011250636 HC RX REV CODE- 250/636: Performed by: INTERNAL MEDICINE

## 2017-11-27 PROCEDURE — 99218 HC RM OBSERVATION: CPT

## 2017-11-27 PROCEDURE — 93306 TTE W/DOPPLER COMPLETE: CPT

## 2017-11-27 PROCEDURE — 83615 LACTATE (LD) (LDH) ENZYME: CPT | Performed by: HOSPITALIST

## 2017-11-27 RX ORDER — ASPIRIN 325 MG/1
50 TABLET, FILM COATED ORAL DAILY
Status: DISCONTINUED | OUTPATIENT
Start: 2017-11-28 | End: 2017-11-28 | Stop reason: HOSPADM

## 2017-11-27 RX ADMIN — MORPHINE SULFATE 2 MG: 2 INJECTION, SOLUTION INTRAMUSCULAR; INTRAVENOUS at 06:25

## 2017-11-27 RX ADMIN — MORPHINE SULFATE 2 MG: 2 INJECTION, SOLUTION INTRAMUSCULAR; INTRAVENOUS at 18:00

## 2017-11-27 RX ADMIN — FOLIC ACID 1 MG: 1 TABLET ORAL at 10:23

## 2017-11-27 RX ADMIN — MORPHINE SULFATE 2 MG: 2 INJECTION, SOLUTION INTRAMUSCULAR; INTRAVENOUS at 21:44

## 2017-11-27 RX ADMIN — SODIUM BICARBONATE 650 MG TABLET 650 MG: at 10:23

## 2017-11-27 RX ADMIN — CARVEDILOL 6.25 MG: 3.12 TABLET, FILM COATED ORAL at 10:23

## 2017-11-27 RX ADMIN — AMLODIPINE BESYLATE 10 MG: 5 TABLET ORAL at 10:23

## 2017-11-27 RX ADMIN — SODIUM BICARBONATE 650 MG TABLET 650 MG: at 21:44

## 2017-11-27 RX ADMIN — MORPHINE SULFATE 2 MG: 2 INJECTION, SOLUTION INTRAMUSCULAR; INTRAVENOUS at 14:58

## 2017-11-27 RX ADMIN — MORPHINE SULFATE 2 MG: 2 INJECTION, SOLUTION INTRAMUSCULAR; INTRAVENOUS at 12:08

## 2017-11-27 RX ADMIN — CARVEDILOL 6.25 MG: 3.12 TABLET, FILM COATED ORAL at 18:00

## 2017-11-27 RX ADMIN — DOXEPIN HYDROCHLORIDE 50 MG: 50 CAPSULE ORAL at 21:44

## 2017-11-27 RX ADMIN — SERTRALINE HYDROCHLORIDE 100 MG: 50 TABLET ORAL at 10:23

## 2017-11-27 RX ADMIN — SODIUM CHLORIDE 150 ML/HR: 900 INJECTION, SOLUTION INTRAVENOUS at 14:58

## 2017-11-27 RX ADMIN — Medication 100 MG: at 10:23

## 2017-11-27 NOTE — CDMP QUERY
Please clarify if this patient is being treated/managed for:    =>Severe Malnutrition PER [place] Criteria   =>Other Explanation of clinical findings  =>Unable to Determine (no explanation of clinical findings)    Adult Malnutrition Guidelines:  SEVERE PROTEIN CALORIE MALNUTRITION IN THE CONTEXT OF CHRONIC ILLNESS  Weight Loss:  >5% x 1 month or >7.5% x 3 months or >10% x 6 months or >20% x 12 months  Energy Intake: <75% energy intake compared to estimated energy needs >1 month      The medical record reflects the following:    Risk:SSC/chronic pain    Clinical Indicators: PER RD \"ANTHROPOMETRICS  Height: 5' 4\" (162.6 cm)       Weight: 56.7 kg (125 lb)    BMI: 21.4 kg/m^2  -  normal weight (18.5%-24.9% BMI)        Weight change: pt was 161# on 3/16/17 currently pt is 125# currently which is a -22.36% wt loss in 8 months which is significant  \"Predicted suboptimal energy intake related to decrease appetite as evidence by -22.36% wt loss in 8 months\". Treatment: Ensure Enlive TID    Please clarify and document your clinical opinion in the progress notes and discharge summary including the definitive and/or presumptive diagnosis, (suspected or probable), related to the above clinical findings. Please include clinical findings supporting your diagnosis. If you DECLINE this query or would like to communicate with [facility], please utilize the \"[facility] message box\" at the TOP of the Progress Note on the right.       Thank you,    Suman Naqvi RN [facility] [phone]

## 2017-11-27 NOTE — PROGRESS NOTES
Readmission Risk Assessment:     Moderate Risk and MSSP/Good Help ACO patients    RRAT Score:  13-20    Initial Assessment: Chart reviewed. Spoke with patient at bedside. Pt admitted to medical unit for sickle cell crisis. During assessment patient states he recently (7-8mo ago) got out of [facility]. States he was incarcerated for around 40 days. Pt denies having PCP or hematology follow-up since leaving [facility]. States when he has sickle cell exacerbations he goes to ER for management. During interview patient reports +SI. States he has plan to \"run in front of a bus\" when he leaves hospital.  States he has no thoughts of self harm while in hospital.  Reports hx depression, SI and suicide attempts by cutting. Pt states he was admitted to [place] for mental health treatment around 10mo ago. Pt also reports hx drug abuse, uses heroin. Pt states he has family in the area; reports he lives with his daughter, Erlin Huber. Pt did not know her contact number at this time. Pt reports he is on disability. Primary bedside nurse, Radha Cuevas and Everett Galaviz RN, made aware of patient's SI and hx depression. Dr. Izaiah Culp has placed order for psych consult. CM available for discharge disposition once psych eval has been completed. Emergency Contact:  Erlin Huber, daughter    Pertinent Medical Hx:     Sickle cell, drug abuse, depression    PCP/Specialists: none listed      Community Services:       DME:          Moderate Risk Care Transition Plan:  1. Evaluate for [facility] or [facility], SNF, acute rehab, community care coordination of resources. 2. Involve patient/caregiver in assessment, planning, education and implement of intervention. 3. CM daily patient care huddles/interdisciplinary rounds. 4. PCP/Specialist appointment within 5  7 days made prior to discharge. 5. Facilitate transportation and logistics for follow-up appointments. 6. Medication reconciliation [id number]  7.  Formal handoff between hospital provider and post-acute provider to transition patient  Handoff to [phone] Nurse Navigator or PCP practice.

## 2017-11-27 NOTE — PROGRESS NOTES
Hospitalist Progress Note-critical care note     Patient: Jocelyne Gómez MRN: [id number]  CSN: [id number]    YOB: 1960  Age: 65 y.o. Sex: male    DOA: 11/26/2017 LOS:  LOS: 1 day            Chief complaint: chest pain, sickle cell pain crisis , anemia , depression     Assessment/Plan         Hospital Problems  Date Reviewed: 3/21/2017          Codes Class Noted POA    Chest pain ICD-10-CM: R07.9  ICD-9-CM: 786.50  11/26/2017 Unknown        * (Principal)Sickle cell pain crisis [facility]) ICD-10-CM: D57.00  ICD-9-CM: 282.62  11/26/2017 Unknown        EDGAR (acute kidney injury) ([facility] ICD-10-CM: N17.9  ICD-9-CM: 584.9  11/20/2016 Unknown        Sickle cell anemia (HCC) (Chronic) ICD-10-CM: D57.1  ICD-9-CM: 282.60  11/4/2014 Unknown            1. Sickle cell pain crisis  Still having pain in shoulder and back , no chest pain   Continue hydration and pain control    improving a little     2.-Acute on chronic renal failure  Will continue monitor renal function and avoid iv contrast   3.-Accelerated hypertension  4.-Depression  On zoloft and reported sucidial ideation, no attempt , consult Dr. Owen Yao,     5.-Hepatitis C  Need to be treat as out -pt     6.-Osteoarthritis  7.-Polysubstance abuse  8 sickle cell anemia     will continue monitoring   10 chest pain on admission   Will have echo, last echo in march, possible small pericardia effusion     Subjective: pain in the shoulder and back , no chest pain, no sob, thinking about kill himself before, but no plan     Nurse: no acute issue   Review of systems:    General: No fevers or chills. Cardiovascular: No chest pain or pressure. No palpitations. Pulmonary: No shortness of breath. Gastrointestinal: No nausea, vomiting.    Msk:pain in the shoulder and back   Vital signs/Intake and Output:  Visit Vitals    /85    Pulse 77    Temp 98.3 °F (36.8 °C)    Resp 18    Ht 5' 4\" (1.626 m)    Wt 56.7 kg (125 lb)    SpO2 100%    BMI 21.46 kg/m2 Current Shift:     Last three shifts:  11/25 1901 - 11/27 0700  In: 540.9   Out: -     Physical Exam:  General: WD, WN. Alert, cooperative, no acute distress    HEENT: NC, Atraumatic. PERRLA, anicteric sclerae. Lungs: CTA Bilaterally. No Wheezing/Rhonchi/Rales. Heart:  Regular  Rhythm, + murmur, No Rubs, No Gallops  Abdomen: Soft, Non distended, Non tender.  +Bowel sounds,   Extremities: No c/c/e  Psych:   Not anxious or agitated. Neurologic:  No acute neurological deficit. Labs: Results:       Chemistry Recent Labs      11/26/17   0117   GLU  94   NA  138   K  5.5   CL  110*   CO2  16*   BUN  52*   CREA  4.11*   CA  8.6   AGAP  12   BUCR  13   AP  78   TP  7.8   ALB  3.4   GLOB  4.4*   AGRAT  0.8      CBC w/Diff Recent Labs      11/26/17   0117   WBC  13.2   RBC  2.38*   HGB  6.9*   HCT  19.8*   PLT  240   GRANS  77*   LYMPH  14*   EOS  2      Cardiac Enzymes Recent Labs      11/26/17   1111  11/26/17   0515   CPK  45  55   CKND1  CALCULATION NOT PERFORMED WHEN RESULT IS BELOW LINEAR LIMIT  2.4      Coagulation No results for input(s): PTP, INR, APTT in the last 72 hours. No lab exists for component: INREXT    Lipid Panel No results found for: CHOL, CHOLPOCT, CHOLX, CHLST, CHOLV, 949234, HDL, LDL, LDLC, DLDLP, 185579, VLDLC, VLDL, TGLX, TRIGL, TRIGP, TGLPOCT, CHHD, CHHDX   BNP No results for input(s): BNPP in the last 72 hours.    Liver Enzymes Recent Labs      11/26/17   0117   TP  7.8   ALB  3.4   AP  78   SGOT  27      Thyroid Studies Lab Results   Component Value Date/Time    TSH 1.08 03/19/2017 06:05 AM        Procedures/imaging: see electronic medical records for all procedures/Xrays and details which were not copied into this note but were reviewed prior to creation of Aysha Ohara MD

## 2017-11-27 NOTE — PROGRESS NOTES
INITIAL NUTRITION ASSESSMENT     RECOMMENDATIONS/PLAN:   Add Ensure Enlive TID  Add Thiamine and MVI  Monitor labs/lytes, PO intakes, skin integrity, wt, fluid status, BM  Adult Malnutrition Criteria:     Nutrition assessment was completed by RDN and the patient was found to meet the following malnutrition criteria established by ASPEN/AND:    Adult Malnutrition Guidelines:  SEVERE PROTEIN CALORIE MALNUTRITION IN THE CONTEXT OF CHRONIC ILLNESS  Weight Loss:  >5% x 1 month or >7.5% x 3 months or >10% x 6 months or >20% x 12 months  Energy Intake: <75% energy intake compared to estimated energy needs >1 month    . Lety Zarco  11/27/17      REASON FOR ASSESSMENT:     []  RN Referral:    [x] MST score >/=2  Malnutrition Screening Tool (MST):  Recently Lost Weight Without Trying: Yes  If Yes, How Much Weight Loss: 24 - 33 lbs  Eating Poorly Due to Decreased Appetite: No  MST Score: 3      NUTRITION ASSESSMENT:   Client History: 65 yrs old Male admitted with chest pain and sickle cell anemia. ETOH and substance abuse noted. PMHx: arthritis, chronic right hip pain, sickle cell anemia, hepatitis C, HTN, depression, sickle cell crisis   Cultural/Catholic Food Preferences: None Identified    FOOD/NUTRITION HISTORY  Diet History: unable to obtain at this time    Food Allergies:  [x] NKFA       Pertinent PTA Medications: vit Y08, folic acid, vit b6    NUTRITION INTAKE   Diet Order:  Regular       Average PO Intake:       No data found.      Pertinent Medications:  [x] Reviewed; folic acid, vit b6,    Insulin:  [] SSI  [] Pre-meal   []  Basal   [] Drip  [x] None  Pt expected to meet estimated nutrient needs through next review:          [x]  Yes     [] No;  ANTHROPOMETRICS  Height: 5' 4\" (162.6 cm)       Weight: 56.7 kg (125 lb)    BMI: 21.4 kg/m^2  -  normal weight (18.5%-24.9% BMI)        Weight change: pt was 161# on 3/16/17 currently pt is 125# currently which is a -22.36% wt loss in 8 months which is significant                                   Comparison to Reference Standards:  IBW: 130 lbs      %IBW: 96%      AdjBW: n/a    NUTRITION-FOCUSED PHYSICAL ASSESSMENT  Skin: No Pressure Injury noted . GI: N0 BM noted     BIOCHEMICAL DATA & MEDICAL TESTS  Pertinent Labs:  [x] Reviewed;      NUTRITION PRESCRIPTION  Calories: 4441-2438 kcal/day based on 30-35kcal/kg  Protein:  g/day based on 1.5-2.0 g/kg  CHO: 213- 248g/day based on 50% of total energy  Fluid: 0110-3327 ml/day based on 1 kcal/ml      NUTRITION DIAGNOSES:   1. Predicted suboptimal energy intake related to decrease appetite as evidence by -22.36% wt loss in 8 months    NUTRITION INTERVENTIONS:   INTERVENTIONS:        GOALS:  1. Commercial Beverage-Ensure Enlive TID 1. Meet estimated needs by consuming >75% PO intake at most meals by next review 3 days   2. MVI & Thiamine           LEARNING NEEDS (Diet, Supplementation, Food/Nutrient-Drug Interaction):   [x] None Identified  [] Inpatient education provided/documented    [] Identified and patient:  [] Declined     [] Was not appropriate/indicated  NUTRITION MONITORING /EVALUATION:   Follow PO intake  Monitor wt  Monitor renal labs, electrolytes, fluid status  Monitor for additional supplement needs    [] Participated in Interdisciplinary Rounds  [x] Interdisciplinary Care Plan Reviewed/Documented  DISCHARGE NUTRITION RECOMMENDATIONS ADDRESSED:     [x] Yes- recommended Regular diet    NUTRITION RISK:     [x]  At risk                     []  Not currently at risk     Will follow-up per policy.   Dany Campoverde RD  PAGER:  [phone]
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
## 2017-11-28 ENCOUNTER — HOSPITAL ENCOUNTER (INPATIENT)
Age: 57
LOS: 3 days | Discharge: HOME | DRG: 754 | End: 2017-12-01
Attending: PSYCHIATRY & NEUROLOGY | Admitting: PSYCHIATRY & NEUROLOGY
Payer: MEDICAID

## 2017-11-28 VITALS
WEIGHT: 125 LBS | RESPIRATION RATE: 18 BRPM | DIASTOLIC BLOOD PRESSURE: 80 MMHG | TEMPERATURE: 97.7 F | OXYGEN SATURATION: 100 % | HEART RATE: 72 BPM | SYSTOLIC BLOOD PRESSURE: 162 MMHG | BODY MASS INDEX: 21.34 KG/M2 | HEIGHT: 64 IN

## 2017-11-28 LAB
ANION GAP SERPL CALC-SCNC: 5 MMOL/L (ref 3–18)
BASOPHILS # BLD: 0.1 K/UL (ref 0–0.06)
BASOPHILS NFR BLD: 1 % (ref 0–2)
BUN SERPL-MCNC: 34 MG/DL (ref 7–18)
BUN/CREAT SERPL: 13 (ref 12–20)
CALCIUM SERPL-MCNC: 7.8 MG/DL (ref 8.5–10.1)
CHLORIDE SERPL-SCNC: 114 MMOL/L (ref 100–108)
CO2 SERPL-SCNC: 20 MMOL/L (ref 21–32)
CREAT SERPL-MCNC: 2.66 MG/DL (ref 0.6–1.3)
DIFFERENTIAL METHOD BLD: ABNORMAL
EOSINOPHIL # BLD: 0.6 K/UL (ref 0–0.4)
EOSINOPHIL NFR BLD: 5 % (ref 0–5)
ERYTHROCYTE [DISTWIDTH] IN BLOOD BY AUTOMATED COUNT: 17.4 % (ref 11.6–14.5)
GLUCOSE SERPL-MCNC: 89 MG/DL (ref 74–99)
HCT VFR BLD AUTO: 22.6 % (ref 36–48)
HGB BLD-MCNC: 7.8 G/DL (ref 13–16)
LDH SERPL L TO P-CCNC: 225 U/L (ref 81–234)
LYMPHOCYTES # BLD: 1.9 K/UL (ref 0.9–3.6)
LYMPHOCYTES NFR BLD: 18 % (ref 21–52)
MAGNESIUM SERPL-MCNC: 1.6 MG/DL (ref 1.6–2.6)
MCH RBC QN AUTO: 29.3 PG (ref 24–34)
MCHC RBC AUTO-ENTMCNC: 34.5 G/DL (ref 31–37)
MCV RBC AUTO: 85 FL (ref 74–97)
MONOCYTES # BLD: 0.7 K/UL (ref 0.05–1.2)
MONOCYTES NFR BLD: 7 % (ref 3–10)
NEUTS SEG # BLD: 7.3 K/UL (ref 1.8–8)
NEUTS SEG NFR BLD: 69 % (ref 40–73)
PLATELET # BLD AUTO: 164 K/UL (ref 135–420)
PMV BLD AUTO: 10.6 FL (ref 9.2–11.8)
POTASSIUM SERPL-SCNC: 5.5 MMOL/L (ref 3.5–5.5)
RBC # BLD AUTO: 2.66 M/UL (ref 4.7–5.5)
SODIUM SERPL-SCNC: 139 MMOL/L (ref 136–145)
WBC # BLD AUTO: 10.5 K/UL (ref 4.6–13.2)

## 2017-11-28 PROCEDURE — 99218 HC RM OBSERVATION: CPT

## 2017-11-28 PROCEDURE — 83735 ASSAY OF MAGNESIUM: CPT | Performed by: HOSPITALIST

## 2017-11-28 PROCEDURE — 85025 COMPLETE CBC W/AUTO DIFF WBC: CPT | Performed by: HOSPITALIST

## 2017-11-28 PROCEDURE — 93005 ELECTROCARDIOGRAM TRACING: CPT

## 2017-11-28 PROCEDURE — 74011250636 HC RX REV CODE- 250/636: Performed by: INTERNAL MEDICINE

## 2017-11-28 PROCEDURE — 74011250637 HC RX REV CODE- 250/637: Performed by: INTERNAL MEDICINE

## 2017-11-28 PROCEDURE — 80048 BASIC METABOLIC PNL TOTAL CA: CPT | Performed by: HOSPITALIST

## 2017-11-28 PROCEDURE — 96361 HYDRATE IV INFUSION ADD-ON: CPT

## 2017-11-28 PROCEDURE — 36415 COLL VENOUS BLD VENIPUNCTURE: CPT | Performed by: HOSPITALIST

## 2017-11-28 PROCEDURE — 96376 TX/PRO/DX INJ SAME DRUG ADON: CPT

## 2017-11-28 PROCEDURE — 65220000003 HC RM SEMIPRIVATE PSYCH

## 2017-11-28 PROCEDURE — 83615 LACTATE (LD) (LDH) ENZYME: CPT | Performed by: HOSPITALIST

## 2017-11-28 RX ORDER — IBUPROFEN 200 MG
1 TABLET ORAL
Status: DISCONTINUED | OUTPATIENT
Start: 2017-11-28 | End: 2017-12-01 | Stop reason: HOSPADM

## 2017-11-28 RX ORDER — IBUPROFEN 400 MG/1
400 TABLET ORAL
Status: DISCONTINUED | OUTPATIENT
Start: 2017-11-28 | End: 2017-12-01 | Stop reason: HOSPADM

## 2017-11-28 RX ORDER — OXYCODONE AND ACETAMINOPHEN 7.5; 325 MG/1; MG/1
1 TABLET ORAL
Qty: 10 TAB | Refills: 0 | Status: SHIPPED | OUTPATIENT
Start: 2017-11-28 | End: 2017-12-01

## 2017-11-28 RX ORDER — SERTRALINE HYDROCHLORIDE 100 MG/1
150 TABLET, FILM COATED ORAL DAILY
Qty: 45 TAB | Refills: 0 | Status: SHIPPED | OUTPATIENT
Start: 2017-11-28 | End: 2017-12-01

## 2017-11-28 RX ORDER — ZOLPIDEM TARTRATE 10 MG/1
10 TABLET ORAL
Status: DISCONTINUED | OUTPATIENT
Start: 2017-11-28 | End: 2017-12-01 | Stop reason: HOSPADM

## 2017-11-28 RX ORDER — ACETAMINOPHEN 325 MG/1
650 TABLET ORAL
Status: DISCONTINUED | OUTPATIENT
Start: 2017-11-28 | End: 2017-12-01 | Stop reason: HOSPADM

## 2017-11-28 RX ORDER — LORAZEPAM 2 MG/ML
2 INJECTION INTRAMUSCULAR
Status: DISCONTINUED | OUTPATIENT
Start: 2017-11-28 | End: 2017-12-01 | Stop reason: HOSPADM

## 2017-11-28 RX ORDER — SERTRALINE HYDROCHLORIDE 50 MG/1
150 TABLET, FILM COATED ORAL DAILY
Status: DISCONTINUED | OUTPATIENT
Start: 2017-11-29 | End: 2017-11-28 | Stop reason: HOSPADM

## 2017-11-28 RX ORDER — ADHESIVE BANDAGE
30 BANDAGE TOPICAL DAILY PRN
Status: DISCONTINUED | OUTPATIENT
Start: 2017-11-28 | End: 2017-12-01 | Stop reason: HOSPADM

## 2017-11-28 RX ORDER — BENZTROPINE MESYLATE 2 MG/1
2 TABLET ORAL
Status: DISCONTINUED | OUTPATIENT
Start: 2017-11-28 | End: 2017-12-01 | Stop reason: HOSPADM

## 2017-11-28 RX ORDER — LORAZEPAM 1 MG/1
1 TABLET ORAL
Status: DISCONTINUED | OUTPATIENT
Start: 2017-11-28 | End: 2017-11-30

## 2017-11-28 RX ORDER — BENZTROPINE MESYLATE 1 MG/ML
2 INJECTION INTRAMUSCULAR; INTRAVENOUS
Status: DISCONTINUED | OUTPATIENT
Start: 2017-11-28 | End: 2017-12-01 | Stop reason: HOSPADM

## 2017-11-28 RX ORDER — OLANZAPINE 5 MG/1
5 TABLET ORAL
Status: DISCONTINUED | OUTPATIENT
Start: 2017-11-28 | End: 2017-12-01 | Stop reason: HOSPADM

## 2017-11-28 RX ADMIN — SODIUM BICARBONATE 650 MG TABLET 650 MG: at 10:05

## 2017-11-28 RX ADMIN — MORPHINE SULFATE 2 MG: 2 INJECTION, SOLUTION INTRAMUSCULAR; INTRAVENOUS at 00:10

## 2017-11-28 RX ADMIN — MORPHINE SULFATE 2 MG: 2 INJECTION, SOLUTION INTRAMUSCULAR; INTRAVENOUS at 10:05

## 2017-11-28 RX ADMIN — FOLIC ACID 1 MG: 1 TABLET ORAL at 10:05

## 2017-11-28 RX ADMIN — Medication 100 MG: at 10:05

## 2017-11-28 RX ADMIN — MORPHINE SULFATE 2 MG: 2 INJECTION, SOLUTION INTRAMUSCULAR; INTRAVENOUS at 14:13

## 2017-11-28 RX ADMIN — CARVEDILOL 6.25 MG: 3.12 TABLET, FILM COATED ORAL at 10:05

## 2017-11-28 RX ADMIN — SODIUM CHLORIDE 150 ML/HR: 900 INJECTION, SOLUTION INTRAVENOUS at 00:04

## 2017-11-28 RX ADMIN — AMLODIPINE BESYLATE 10 MG: 5 TABLET ORAL at 10:05

## 2017-11-28 RX ADMIN — SODIUM CHLORIDE 150 ML/HR: 900 INJECTION, SOLUTION INTRAVENOUS at 10:08

## 2017-11-28 NOTE — DISCHARGE SUMMARY
Discharge Summary    Patient: Estefani Kenney MRN: [id number]  [facility]: [id number]    YOB: 1960  Age: 65 y.o. Sex: male    DOA: 11/26/2017 LOS:  LOS: 2 days   Discharge Date:      Primary Care Provider:  Lindsay Willis MD    Admission Diagnoses: Chest pain  Sickle cell anemia ([facility]  EDGAR (acute kidney injury) [facility])    Discharge Diagnoses:    Hospital Problems  Date Reviewed: 3/21/2017          Codes Class Noted POA    Depression ICD-10-CM: F32.9  ICD-9-CM: 884  11/27/2017 Unknown        Chest pain ICD-10-CM: R07.9  ICD-9-CM: 786.50  11/26/2017 Unknown        * (Principal)Sickle cell pain crisis ([facility] ICD-10-CM: D57.00  ICD-9-CM: 282.62  11/26/2017 Unknown        EDGAR (acute kidney injury) ([facility] ICD-10-CM: N17.9  ICD-9-CM: 584.9  11/20/2016 Unknown        Suicidal ideation ICD-10-CM: R45.851  ICD-9-CM: V62.84  10/22/2015 Yes        Sickle cell anemia (HCC) (Chronic) ICD-10-CM: D57.1  ICD-9-CM: 282.60  11/4/2014 Unknown        Chronic hepatitis C ([facility] (Chronic) ICD-10-CM: B18.2  ICD-9-CM: 070.54  11/4/2014 Yes              Discharge Condition: Stable    Discharge Medications:     Current Discharge Medication List      CONTINUE these medications which have CHANGED    Details   sertraline (ZOLOFT) 100 mg tablet Take 1.5 Tabs by mouth daily. Indications: ANXIETY WITH DEPRESSION, substance induced depression  Qty: 45 Tab, Refills: 0         CONTINUE these medications which have NOT CHANGED    Details   oxyCODONE-acetaminophen (PERCOCET 7.5) 7.5-325 mg per tablet Take 1 Tab by mouth every six (6) hours as needed. Max Daily Amount: 4 Tabs. Qty: 10 Tab, Refills: 0      sodium bicarbonate 650 mg tablet Take 650 mg by mouth two (2) times a day. carvedilol (COREG) 6.25 mg tablet Take 1 Tab by mouth two (2) times daily (with meals). Qty: 60 Tab, Refills: 0      amLODIPine (NORVASC) 5 mg tablet Take 2 Tabs by mouth daily. Qty: 60 Tab, Refills: 0      doxepin (SINEQUAN) 50 mg capsule Take 1 Cap by mouth nightly. Indications: Depression  Qty: 30 Cap, Refills: 0      cyanocobalamin (VITAMIN B-12) 100 mcg tablet Take 1 Tab by mouth daily. Qty: 30 Tab, Refills: 0      folic acid (FOLVITE) 1 mg tablet Take 1 Tab by mouth daily. Qty: 30 Tab, Refills: 2      pyridoxine, vitamin B6, (VITAMIN B-6) 100 mg tablet Take 1 Tab by mouth daily. Qty: 30 Tab, Refills: 0      traZODone (DESYREL) 50 mg tablet Take 1 Tab by mouth nightly as needed for Sleep. Indications: sleep  Qty: 30 Tab, Refills: 3             Procedures : none    Consults: Psychiatry      PHYSICAL EXAM   Visit Vitals    BP (!) 164/91 (BP 1 Location: Left arm, BP Patient Position: At rest)    Pulse 74    Temp 97.4 °F (36.3 °C)    Resp 18    Ht 5' 4\" (1.626 m)    Wt 56.7 kg (125 lb)    SpO2 100%    BMI 21.46 kg/m2     General: Awake, cooperative, no acute distress    HEENT: NC, Atraumatic. PERRLA, EOMI. Anicteric sclerae. Lungs:  CTA Bilaterally. No Wheezing/Rhonchi/Rales. Heart:  Regular  rhythm,  +murmur, No Rubs, No Gallops  Abdomen: Soft, Non distended, Non tender. +Bowel sounds,   Extremities: No c/c/e  Psych:   Not anxious or agitated. Neurologic:  No acute neurological deficits. Admission HPI :  Brenden Zavala is a 65 y.o. male who has a past medical history significant for sickle cell disease, depression, hepatitis C, hypertension, osteoarthritis and polysubstance abuse presenting with a several week history of weakness, dizziness and shortness of breath and more recently with a one-day history of chest pain. He reports that the pain feels similar to previous episodes of sickle cell crisis. Upon interview the patient appears comfortable but endorsed uncontrolled pain. On arrival he was afebrile, blood pressure 190/91, respiration 16 with oxygen saturations of 100% on room air.  Laboratory work up demonstrated anemia hemoglobin 6.9 which is down from his prior measurement in April of this year at 8.6, Creatinine 4.11 up from his prior measure of 2.78. Pertinent negatives include a -1st set of cardiac enzymes and an EKG that showed normal sinus rhythm with sinus arrhythmia at a rate of 79. In the ER he received normal saline, Toradol, breathing treatment and two doses of Stadol. Subsequently hospital medicine was contacted for admission to the hospital and further management. Hospital Course : For Sickle cell pain crisis, he received iv hydration and po percocet for pain control. His pain is back to his base line. LDH was wnl and urine was clear. He also presented acute on chronic renal failure, Cr ws back to his baseline per hydration. norvasc was given for HTN control. He has depression with suicidal ideation, psychiatry is consulted and recommended increase zoloft to 150 mg and in-pt psych. He has hcv , need f/u with gi for treatment after d/c from psych facility. His sickle cell anemia  is stable. H/h 7.8/22.6-round his baseline. He presented chest pain on admission-no acute chest-echo ef was wnl .     He is medically stable to be d/c.              Activity: Activity as tolerated    Diet: Regular Diet    Follow-up: pcm, hematology /psych /gi     Disposition: psych in-pt     Minutes spent on discharge: 50 min      Labs: Results:       Chemistry Recent Labs      11/28/17 0410  11/26/17   0117   GLU  89  94   NA  139  138   K  5.5  5.5   CL  114*  110*   CO2  20*  16*   BUN  34*  52*   CREA  2.66*  4.11*   CA  7.8*  8.6   AGAP  5  12   BUCR  13  13   AP   --   78   TP   --   7.8   ALB   --   3.4   GLOB   --   4.4*   AGRAT   --   0.8      CBC w/Diff Recent Labs      11/28/17   0410  11/26/17   0117   WBC  10.5  13.2   RBC  2.66*  2.38*   HGB  7.8*  6.9*   HCT  22.6*  19.8*   PLT  164  240   GRANS  69  77*   LYMPH  18*  14*   EOS  5  2      Cardiac Enzymes Recent Labs      11/26/17   1111  11/26/17   0515   CPK  45  55   CKND1  CALCULATION NOT PERFORMED WHEN RESULT IS BELOW LINEAR LIMIT  2.4      Coagulation No results for input(s): PTP, INR, APTT in the last 72 hours. No lab exists for component: INREXT    Lipid Panel No results found for: CHOL, CHOLPOCT, CHOLX, CHLST, CHOLV, 236283, HDL, LDL, LDLC, DLDLP, 632756, VLDLC, VLDL, TGLX, TRIGL, TRIGP, TGLPOCT, CHHD, CHHDX   BNP No results for input(s): BNPP in the last 72 hours. Liver Enzymes Recent Labs      11/26/17   0117   TP  7.8   ALB  3.4   AP  78   SGOT  27      Thyroid Studies Lab Results   Component Value Date/Time    TSH 1.08 03/19/2017 06:05 AM            Significant Diagnostic Studies: Xr Chest Pa Lat    Result Date: 11/26/2017  CLINICAL HISTORY:  Chest pain. COMPARISON EXAMINATIONS:  March 19, 2017. ---  CHEST PA AND LATERAL  --- The cardiomediastinal silhouette is normal.  The lungs are clear. There are no significant pleural effusions. The bony structures and soft tissues are unremarkable. --------------    IMPRESSION: -------------- No active cardiopulmonary disease.             [facility]     CC: Lindsay Willis MD

## 2017-11-28 NOTE — PROGRESS NOTES
Hospitalist Progress Note-critical care note     Patient: Kamryn Morrison MRN: [id number]  CSN: [id number]    YOB: 1960  Age: 65 y.o. Sex: male    DOA: 11/26/2017 LOS:  LOS: 2 days            Chief complaint: chest pain, sickle cell pain crisis , anemia , depression     Assessment/Plan         Hospital Problems  Date Reviewed: 3/21/2017          Codes Class Noted POA    Depression ICD-10-CM: F32.9  ICD-9-CM: 001  11/27/2017 Unknown        Chest pain ICD-10-CM: R07.9  ICD-9-CM: 786.50  11/26/2017 Unknown        * (Principal)Sickle cell pain crisis ([facility] ICD-10-CM: D57.00  ICD-9-CM: 282.62  11/26/2017 Unknown        EDGAR (acute kidney injury) ([facility] ICD-10-CM: N17.9  ICD-9-CM: 584.9  11/20/2016 Unknown        Suicidal ideation ICD-10-CM: R45.851  ICD-9-CM: V62.84  10/22/2015 Yes        Sickle cell anemia (HCC) (Chronic) ICD-10-CM: D57.1  ICD-9-CM: 282.60  11/4/2014 Unknown        Chronic hepatitis C ([facility] (Chronic) ICD-10-CM: B18.2  ICD-9-CM: 070.54  11/4/2014 Yes            1. Sickle cell pain crisis  Pain better, back to his base line   Continue hydration and pain control   LDH wnl     2.-Acute on chronic renal failure  Cr back to his baseline   3.-Accelerated hypertension  Continue norvasc   4.-Depression  Will increase zoloft to 150 mg   Need in-pt treatment    5.-Hepatitis C  Need to be treat as out -pt     6.-Osteoarthritis  7.-Polysubstance abuse  8 sickle cell anemia       10 chest pain on admission    echo ef wnl. No pericardial effusion     Subjective: pain is better       Nurse: no acute issue   Medical clear to go to psych facility   Review of systems:    General: No fevers or chills. Cardiovascular: No chest pain or pressure. No palpitations. Pulmonary: No shortness of breath. Gastrointestinal: No nausea, vomiting.    Msk:pain in the shoulder and back   Vital signs/Intake and Output:  Visit Vitals    BP (!) 164/91 (BP 1 Location: Left arm, BP Patient Position: At rest)    Pulse 74    Temp 97.4 °F (36.3 °C)    Resp 18    Ht 5' 4\" (1.626 m)    Wt 56.7 kg (125 lb)    SpO2 100%    BMI 21.46 kg/m2     Current Shift:     Last three shifts:  11/26 1901 - 11/28 0700  In: 540.9   Out: 651 [Urine:475]    Physical Exam:  General: WD, WN. Alert, cooperative, no acute distress    HEENT: NC, Atraumatic. PERRLA, anicteric sclerae. Lungs: CTA Bilaterally. No Wheezing/Rhonchi/Rales. Heart:  Regular  Rhythm, + murmur, No Rubs, No Gallops  Abdomen: Soft, Non distended, Non tender.  +Bowel sounds,   Extremities: No c/c/e  Psych:   Not anxious or agitated. Neurologic:  No acute neurological deficit. Labs: Results:       Chemistry Recent Labs      11/28/17 0410 11/26/17   0117   GLU  89  94   NA  139  138   K  5.5  5.5   CL  114*  110*   CO2  20*  16*   BUN  34*  52*   CREA  2.66*  4.11*   CA  7.8*  8.6   AGAP  5  12   BUCR  13  13   AP   --   78   TP   --   7.8   ALB   --   3.4   GLOB   --   4.4*   AGRAT   --   0.8      CBC w/Diff Recent Labs      11/28/17   0410  11/26/17   0117   WBC  10.5  13.2   RBC  2.66*  2.38*   HGB  7.8*  6.9*   HCT  22.6*  19.8*   PLT  164  240   GRANS  69  77*   LYMPH  18*  14*   EOS  5  2      Cardiac Enzymes Recent Labs      11/26/17   1111  11/26/17   0515   CPK  45  55   CKND1  CALCULATION NOT PERFORMED WHEN RESULT IS BELOW LINEAR LIMIT  2.4      Coagulation No results for input(s): PTP, INR, APTT in the last 72 hours. No lab exists for component: INREXT, INREXT    Lipid Panel No results found for: CHOL, CHOLPOCT, CHOLX, CHLST, CHOLV, 193019, HDL, LDL, LDLC, DLDLP, 471530, VLDLC, VLDL, TGLX, TRIGL, TRIGP, TGLPOCT, CHHD, CHHDX   BNP No results for input(s): BNPP in the last 72 hours.    Liver Enzymes Recent Labs      11/26/17   0117   TP  7.8   ALB  3.4   AP  78   SGOT  27      Thyroid Studies Lab Results   Component Value Date/Time    TSH 1.08 03/19/2017 06:05 AM        Procedures/imaging: see electronic medical records for all procedures/Xrays and details which were not copied into this note but were reviewed prior to creation of Tiffany Dixon MD

## 2017-11-28 NOTE — PROGRESS NOTES
D/C Plan: [facility] (inpt psych) 11/28/17    Pt has been accepted to [facility] for admission today. Pt is in agreement with transfer to this facility in [place]. Please arrange medical transport at about 1:30/2:00 to [facility] ([address], [address]). Pt will be going to room 730B and with Dr. Gerardo Washington being the accepting psychiatrist.  Please call report to [phone] to ensure a safe transition. No other plan of care needs have been identified.

## 2017-11-28 NOTE — PROGRESS NOTES
Shift Summary :  Pain medication x 1 since 2000 and it continues to be in the left shoulder and back. Changes noted on telemetry with an elevated ST since 1800. Doctor notified and EKG done as ordered. No new changes apparent.

## 2017-11-28 NOTE — PROGRESS NOTES
TRANSFER - IN REPORT:    Verbal report received from  Rufina Smith on [facility]  being received from [facility]) for routine progression of care      Report consisted of patients Situation, Background, Assessment and   Recommendations(SBAR). Information from the following report(s) SBAR was reviewed with the receiving nurse. Opportunity for questions and clarification was provided. Assessment completed upon patients arrival to unit and care assumed.

## 2017-11-28 NOTE — IP AVS SNAPSHOT
Summary of Care Report The Summary of Care report has been created to help improve care coordination. Users with access to [employer] or [address] (Web-based application) may access additional patient information including the Discharge Summary. If you are not currently a [address] user and need more information, please call the number listed below in the [place] [address] section and ask to be connected with Medical Records. Facility Information Name Address Phone [phone] [facility] 7 [phone] Patient Information Patient Name Sex PIPE Buck ([id number]) Male 1960 Discharge Information Admitting Provider Service Area Unit Jez Portillo MD / [address] [facility] / [phone] Discharge Provider Discharge Date/Time Discharge Disposition Destination Howard Fall MD / [phone] [facility] (none) Patient Language Language ENGLISH [13] Hospital Problems as of 2017  Reviewed: 2017  7:21 AM by Agustín Silva MD  
  
  
  
 Class Noted - Resolved Last Modified POA Active Problems * (Principal)Suicidal ideation  10/22/2015 - Present 2017 by Agustín Silva MD Yes Entered by Vitaly Michel DO  
  Depression  2017 - Present 2017 by Jez Portillo MD Unknown Entered by Farzana Yang MD  
  Substance induced mood disorder ([facility]  2017 - Present 2017 by Howard Fall MD Unknown Entered by Howard Fall MD  
  
Non-Hospital Problems as of 2017  Reviewed: 2017  7:21 AM by Agustín Silva MD  
  
  
  
 Class Noted - Resolved Last Modified Active Problems Avascular necrosis of bone of right hip ([facility]  2014 - Present 2014   Entered by Migue Collier DO  
 Sickle cell anemia (HCC) (Chronic)  11/4/2014 - Present 11/26/2017 by Aden Mccall MD  
  Entered by Louis Hope DO  
  ETOH abuse (Chronic)  11/4/2014 - Present 11/8/2014 Entered by Louis Hope DO Chronic hepatitis C ([facility] (Chronic)  11/4/2014 - Present 11/27/2017 by Dino Yeager MD  
  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  
  MDD (major depressive disorder)  10/26/2015 - Present 10/26/2015 by Yusuf Houston Entered by Yusuf Houston Sickle cell crisis ([facility]  11/20/2016 - Present 3/17/2017 by Charly Wheat Entered by Charly Wheat EDGAR (acute kidney injury) ([facility]  11/20/2016 - Present 11/26/2017 by Aden Mccall MD  
  Entered by Charly Wheat Dehydration  11/20/2016 - Present 11/20/2016 by Charly Wheat Entered by Charly Wheat Drug abuse  11/20/2016 - Present 11/20/2016 by Charly Wheat Entered by Charly Wheat Acute hyperkalemia  3/16/2017 - Present 3/17/2017 by Charly Wheat Entered by Kait Castillo MD  
  Narcotic overdose  3/17/2017 - Present 3/17/2017 by Charly Wheat Entered by Charly Wheat Hyponatremia  3/17/2017 - Present 3/17/2017 by Charly Wheat Entered by Charly Wheat   Abscess of buttock, right  3/20/2017 - Present 3/20/2017 by Dino Yeager MD  
  Entered by Dino Yeager MD  
  Pain of right hip joint  3/20/2017 - Present 3/20/2017 by Dion Yeager MD  
  Entered by Dino Yeager MD  
  Bilateral pleural effusion  3/20/2017 - Present 3/20/2017 by Dino Yeager MD  
  Entered by Dino Yeager MD  
  CKD (chronic kidney disease) stage 3, GFR 30-59 ml/min  3/21/2017 - Present 3/21/2017 by Willy Miller MD  
  Entered by Willy Miller MD  
  Chest pain  11/26/2017 - Present 11/26/2017 by Ashly Bennett MD  
  Entered by Ashly Bennett MD  
  Sickle cell pain crisis [facility])  11/26/2017 - Present 11/26/2017 by Willy Miller MD  
  Entered by Willy Miller MD  
  
You are allergic to the following No active allergies Current Discharge Medication List  
  
CONTINUE these medications which have CHANGED Dose & Instructions Dispensing Information Comments  
 amLODIPine 10 mg tablet Commonly known as:  Noah Haven What changed:  medication strength Dose:  10 mg Take 1 Tab by mouth daily. Indications: Chronic Stable Angina Pectoris Quantity:  7 Tab Refills:  0  
   
 * traZODone 50 mg tablet Commonly known as:  Lolis Us What changed:   
- when to take this 
- reasons to take this Dose:  50 mg Take 1 Tab by mouth three (3) times daily as needed (anxiety). Indications: insomnia associated with depression Quantity:  21 Tab Refills:  0  
   
 * traZODone 100 mg tablet Commonly known as:  Lolis Us What changed: You were already taking a medication with the same name, and this prescription was added. Make sure you understand how and when to take each. Dose:  100 mg Take 1 Tab by mouth nightly. Indications: insomnia associated with depression Quantity:  7 Tab Refills:  0  
   
 * Notice: This list has 2 medication(s) that are the same as other medications prescribed for you. Read the directions carefully, and ask your doctor or other care provider to review them with you. CONTINUE these medications which have NOT CHANGED Dose & Instructions Dispensing Information Comments  
 carvedilol 6.25 mg tablet Commonly known as:  Germán Peat Dose:  6.25 mg Take 1 Tab by mouth two (2) times daily (with meals). Indications: hypertension Quantity:  14 Tab Refills:  0  
   
 cyanocobalamin 100 mcg tablet Commonly known as:  VITAMIN B-12 Dose:  100 mcg Take 1 Tab by mouth daily. Indications: PREVENTION OF VITAMIN B12 DEFICIENCY Quantity:  7 Tab Refills:  0  
   
 folic acid 1 mg tablet Commonly known as:  Google Dose:  1 mg Take 1 Tab by mouth daily. Indications: Folate Deficiency Quantity:  7 Tab Refills:  0  
   
 pyridoxine (vitamin B6) 100 mg tablet Commonly known as:  VITAMIN B-6 Dose:  100 mg Take 1 Tab by mouth daily. Indications: DRUG-INDUCED PYRIDOXINE DEFICIENCY Quantity:  7 Tab Refills:  0  
   
 sodium bicarbonate 650 mg tablet Dose:  650 mg Take 1 Tab by mouth two (2) times a day. Indications: Dyspepsia Quantity:  14 Tab Refills:  0 STOP taking these medications Comments  
 doxepin 50 mg capsule Commonly known as:  SINEquan  
   
   
 oxyCODONE-acetaminophen 7.5-325 mg per tablet Commonly known as:  PERCOCET 7.5  
   
   
 sertraline 100 mg tablet Commonly known as:  ZOLOFT Current Immunizations Name Date Influenza Vaccine 10/17/2014 Influenza Vaccine (Quad) PF 11/21/2016, 10/23/2015 Pneumococcal Polysaccharide (PPSV-23) 11/8/2014 Follow-up Information Follow up With Details Comments Contact Info Phys Lazaro, MD   Patient can only remember the practice name and not the physician [facility] On 12/6/2017 you he a 1:30 appointment with Dr. Kit Sutherland   Address: [address] [place] [address] Hours: Open today · 8:30AM5PM 
Phone: [phone] Discharge Instructions DISCHARGE SUMMARY from Nurse Pt. Discharged   With prescriptions What to do at Home: 
Recommended activity: Activity as tolerated, *  Please give a list of your current medications to your Primary Care Provider. *  Please update this list whenever your medications are discontinued, doses are 
    changed, or new medications (including over-the-counter products) are added. *  Please carry medication information at all times in case of emergency situations. These are general instructions for a healthy lifestyle: No smoking/ No tobacco products/ Avoid exposure to second hand smoke Surgeon General's Warning:  Quitting smoking now greatly reduces serious risk to your health. Obesity, smoking, and sedentary lifestyle greatly increases your risk for illness A healthy diet, regular physical exercise & weight monitoring are important for maintaining a healthy lifestyle You may be retaining fluid if you have a history of heart failure or if you experience any of the following symptoms:  Weight gain of 3 pounds or more overnight or 5 pounds in a week, increased swelling in our hands or feet or shortness of breath while lying flat in bed. Please call your doctor as soon as you notice any of these symptoms; do not wait until your next office visit. Recognize signs and symptoms of STROKE: 
 
F-face looks uneven A-arms unable to move or move unevenly S-speech slurred or non-existent T-time-call 911 as soon as signs and symptoms begin-DO NOT go Back to bed or wait to see if you get better-TIME IS BRAIN. Warning Signs of HEART ATTACK Call 911 if you have these symptoms: 
? Chest discomfort. Most heart attacks involve discomfort in the center of the chest that lasts more than a few minutes, or that goes away and comes back. It can feel like uncomfortable pressure, squeezing, fullness, or pain. ? Discomfort in other areas of the upper body. Symptoms can include pain or discomfort in one or both arms, the back, neck, jaw, or stomach. ? Shortness of breath with or without chest discomfort. ? Other signs may include breaking out in a cold sweat, nausea, or lightheadedness. Don't wait more than five minutes to call [address]! Fast action can save your life.  Calling 911 is almost always the fastest way to get lifesaving treatment. Emergency Medical Services staff can begin treatment when they arrive  up to an hour sooner than if someone gets to the hospital by car. The discharge information has been reviewed with the patient. The patient verbalized understanding. Discharge medications reviewed with the patient and appropriate educational materials and side effects teaching were provided. MyChart Activation Thank you for requesting access to [employer]. Please follow the instructions below to securely access and download your online medical record. [employer] allows you to send messages to your doctor, view your test results, renew your prescriptions, schedule appointments, and more. How Do I Sign Up? 1. In your internet browser, go to [email or web address] 
2. Click on the First Time User? Click Here link in the Sign In box. You will be redirect to the New Member Sign Up page. 3. Enter your [employer] Access Code exactly as it appears below. You will not need to use this code after youve completed the sign-up process. If you do not sign up before the expiration date, you must request a new code. [employer] Access Code: [email or web address] Expires: 3/1/2018 12:49 PM (This is the date your [employer] access code will ) 4. Enter the last four digits of your Social Security Number (xxxx) and Date of Birth (mm/dd/yyyy) as indicated and click Submit. You will be taken to the next sign-up page. 5. Create a [employer] ID. This will be your [employer] login ID and cannot be changed, so think of one that is secure and easy to remember. 6. Create a [employer] password. You can change your password at any time. 7. Enter your Password Reset Question and Answer. This can be used at a later time if you forget your password. 8. Enter your e-mail address. You will receive e-mail notification when new information is available in [address] 9. Click Sign Up. You can now view and download portions of your medical record. 10. Click the Download Summary menu link to download a portable copy of your medical information. Additional Information If you have questions, please visit the Frequently Asked Questions section of the [employer] website at [email or web address] Remember, MyChart is NOT to be used for urgent needs. For medical emergencies, dial 911. 
 
 
 
___________________________________________________________________________________________________________________________________DISCHARGE SUMMARY 
 
NAME:Jesús Mcnamara : 1960 MRN: [id number] The patient Campos Gabi exhibits the ability to control behavior in a less restrictive environment. Patient's level of functioning is improving. No assaultive/destructive behavior has been observed for the past 24 hours. No suicidal/homicidal threat or behavior has been observed for the past 24 hours. There is no evidence of serious medication side effects. Patient has not been in physical or protective restraints for at least the past 24 hours. If weapons involved, how are they secured? No weapons involved Is patient aware of and in agreement with discharge plan? She is aware of discharge and is in agreement Arrangements for medication:  Prescriptions given to patient. ( one week scripts given ) Referral for substance abuse treatment ? Yes, referred to [facility] - 2017 at 1:30 with Dr. Tamra Sarkar Referral for smoking cessation needed ? Yes, refused Copy of discharge instructions to  provider?:  Yes, faxed to [phone] Arrangements for transportation home:  Medicaid taxi Keep all follow up appointments as scheduled, continue to take prescribed medications per physician instructions. Mental health crisis number:  127 or your local mental health crisis line number at 656-9642 Chart Review Routing History Recipient Method Report Sent By Rj Hook DO  
 Phone: [phone] In [employer] IP Auto Routed [place], [place] [[id number]] 11/10/2014  5:09 PM 11/10/2014 Laverne Nicholsa MD  
Fax: [phone] Phone: [phone] Fax IP Auto Routed [place] Laverne Nicholas [[id number]] 10/27/2015  1:31 PM 10/27/2015 Violette Steele MD  
Fax: [phone] Phone: [phone] Fax IP Auto Routed Jorge Alberto Meier MD [[id number]] 3/26/2017  2:12 PM 03/26/2017 Zhao Motley MD  
Phone: [phone] In [employer] Routed The MD Kayden [[id number]] 11/27/2017  7:45 PM 11/27/2017

## 2017-11-28 NOTE — IP AVS SNAPSHOT
[address] 
[phone] Patient: Pete Somers MRN: [id number] GMB:[birth year]8 My Medications STOP taking these medications   
 doxepin 50 mg capsule Commonly known as:  SINEquan  
   
  
 oxyCODONE-acetaminophen 7.5-325 mg per tablet Commonly known as:  PERCOCET 7.5  
   
  
 sertraline 100 mg tablet Commonly known as:  ZOLOFT  
   
  
  
TAKE these medications as instructed Instructions Each Dose to Equal  
 Morning Noon Evening Bedtime  
 amLODIPine 10 mg tablet Commonly known as:  Dylan Aisf Take 1 Tab by mouth daily. Indications: Chronic Stable Angina Pectoris 10 mg Tomorrow  12/2/2017  
   
   
   
  
 carvedilol 6.25 mg tablet Commonly known as:  Patrick Recio Take 1 Tab by mouth two (2) times daily (with meals). Indications: hypertension 6.25 mg Today  12/1/2017  
   
  
 cyanocobalamin 100 mcg tablet Commonly known as:  VITAMIN B-12 Take 1 Tab by mouth daily. Indications: PREVENTION OF VITAMIN B12 DEFICIENCY  
 100 mcg Tomorrow  12/2/2017  
   
   
   
  
 folic acid 1 mg tablet Commonly known as:  Google Take 1 Tab by mouth daily. Indications: Folate Deficiency 1 mg Tomorrow 12/2/2017  
   
   
   
  
 pyridoxine (vitamin B6) 100 mg tablet Commonly known as:  VITAMIN B-6 Take 1 Tab by mouth daily. Indications: DRUG-INDUCED PYRIDOXINE DEFICIENCY  
 100 mg Tomorrow 12/2/2017  
   
   
   
  
 sodium bicarbonate 650 mg tablet Take 1 Tab by mouth two (2) times a day. Indications: Dyspepsia  
 650 mg Tomorrow 12/2/2017 * traZODone 50 mg tablet Commonly known as:  Brian Wagner Take 1 Tab by mouth three (3) times daily as needed (anxiety). Indications: insomnia associated with depression 50 mg Bedtime today 12/1/2017 * traZODone 100 mg tablet Commonly known as:  Lolis Us Take 1 Tab by mouth nightly. Indications: insomnia associated with depression 100 mg Today at bedtime 12/1/2017 * Notice: This list has 2 medication(s) that are the same as other medications prescribed for you. Read the directions carefully, and ask your doctor or other care provider to review them with you. Where to Get Your Medications Information on where to get these meds will be given to you by the nurse or doctor. ! Ask your nurse or doctor about these medications  
  amLODIPine 10 mg tablet  
 carvedilol 6.25 mg tablet  
 cyanocobalamin 100 mcg tablet  
 folic acid 1 mg tablet  
 pyridoxine (vitamin B6) 100 mg tablet  
 sodium bicarbonate 650 mg tablet  
 traZODone 100 mg tablet  
 traZODone 50 mg tablet

## 2017-11-28 NOTE — PROGRESS NOTES
Noted psych recommendation for inpt psych once medically stable. Notified pt is medically stable for discharge today. Clinical has been sent out for review to assist with facilitating an inpt psych unit. Continuing to follow.

## 2017-11-29 ENCOUNTER — APPOINTMENT (OUTPATIENT)
Dept: GENERAL RADIOLOGY | Age: 57
DRG: 754 | End: 2017-11-29
Attending: FAMILY MEDICINE
Payer: MEDICAID

## 2017-11-29 PROBLEM — F19.94 SUBSTANCE INDUCED MOOD DISORDER (HCC): Status: ACTIVE | Noted: 2017-11-29

## 2017-11-29 LAB
ANION GAP SERPL CALC-SCNC: 7 MMOL/L (ref 5–15)
BASOPHILS # BLD: 0 K/UL (ref 0–0.1)
BASOPHILS NFR BLD: 0 % (ref 0–1)
BUN SERPL-MCNC: 34 MG/DL (ref 6–20)
BUN/CREAT SERPL: 13 (ref 12–20)
CALCIUM SERPL-MCNC: 8 MG/DL (ref 8.5–10.1)
CHLORIDE SERPL-SCNC: 111 MMOL/L (ref 97–108)
CO2 SERPL-SCNC: 20 MMOL/L (ref 21–32)
CREAT SERPL-MCNC: 2.63 MG/DL (ref 0.7–1.3)
EOSINOPHIL # BLD: 0.5 K/UL (ref 0–0.4)
EOSINOPHIL NFR BLD: 5 % (ref 0–7)
ERYTHROCYTE [DISTWIDTH] IN BLOOD BY AUTOMATED COUNT: 17.5 % (ref 11.5–14.5)
GLUCOSE SERPL-MCNC: 90 MG/DL (ref 65–100)
HCT VFR BLD AUTO: 25.2 % (ref 36.6–50.3)
HGB BLD-MCNC: 8.5 G/DL (ref 12.1–17)
LYMPHOCYTES # BLD: 1.1 K/UL (ref 0.8–3.5)
LYMPHOCYTES NFR BLD: 11 % (ref 12–49)
MAGNESIUM SERPL-MCNC: 1.8 MG/DL (ref 1.6–2.4)
MCH RBC QN AUTO: 29.1 PG (ref 26–34)
MCHC RBC AUTO-ENTMCNC: 33.7 G/DL (ref 30–36.5)
MCV RBC AUTO: 86.3 FL (ref 80–99)
MONOCYTES # BLD: 0.6 K/UL (ref 0–1)
MONOCYTES NFR BLD: 6 % (ref 5–13)
NEUTS SEG # BLD: 7.8 K/UL (ref 1.8–8)
NEUTS SEG NFR BLD: 78 % (ref 32–75)
PLATELET # BLD AUTO: 177 K/UL (ref 150–400)
POTASSIUM SERPL-SCNC: 5.5 MMOL/L (ref 3.5–5.1)
RBC # BLD AUTO: 2.92 M/UL (ref 4.1–5.7)
SODIUM SERPL-SCNC: 138 MMOL/L (ref 136–145)
TSH SERPL DL<=0.05 MIU/L-ACNC: 1.32 UIU/ML (ref 0.36–3.74)
WBC # BLD AUTO: 10 K/UL (ref 4.1–11.1)

## 2017-11-29 PROCEDURE — 83735 ASSAY OF MAGNESIUM: CPT | Performed by: FAMILY MEDICINE

## 2017-11-29 PROCEDURE — 74011250637 HC RX REV CODE- 250/637

## 2017-11-29 PROCEDURE — 65220000003 HC RM SEMIPRIVATE PSYCH

## 2017-11-29 PROCEDURE — 36415 COLL VENOUS BLD VENIPUNCTURE: CPT

## 2017-11-29 PROCEDURE — 80048 BASIC METABOLIC PNL TOTAL CA: CPT | Performed by: FAMILY MEDICINE

## 2017-11-29 PROCEDURE — 84443 ASSAY THYROID STIM HORMONE: CPT

## 2017-11-29 PROCEDURE — 73030 X-RAY EXAM OF SHOULDER: CPT

## 2017-11-29 PROCEDURE — 74011250637 HC RX REV CODE- 250/637: Performed by: FAMILY MEDICINE

## 2017-11-29 PROCEDURE — 85025 COMPLETE CBC W/AUTO DIFF WBC: CPT | Performed by: FAMILY MEDICINE

## 2017-11-29 RX ORDER — FOLIC ACID 1 MG/1
1 TABLET ORAL DAILY
Status: DISCONTINUED | OUTPATIENT
Start: 2017-11-29 | End: 2017-12-01 | Stop reason: HOSPADM

## 2017-11-29 RX ORDER — UREA 10 %
100 LOTION (ML) TOPICAL DAILY
Status: DISCONTINUED | OUTPATIENT
Start: 2017-11-29 | End: 2017-12-01 | Stop reason: HOSPADM

## 2017-11-29 RX ORDER — AMLODIPINE BESYLATE 5 MG/1
10 TABLET ORAL DAILY
Status: DISCONTINUED | OUTPATIENT
Start: 2017-11-29 | End: 2017-12-01 | Stop reason: HOSPADM

## 2017-11-29 RX ORDER — LANOLIN ALCOHOL/MO/W.PET/CERES
100 CREAM (GRAM) TOPICAL DAILY
Status: DISCONTINUED | OUTPATIENT
Start: 2017-11-29 | End: 2017-12-01 | Stop reason: HOSPADM

## 2017-11-29 RX ORDER — CARVEDILOL 6.25 MG/1
6.25 TABLET ORAL 2 TIMES DAILY WITH MEALS
Status: DISCONTINUED | OUTPATIENT
Start: 2017-11-29 | End: 2017-12-01 | Stop reason: HOSPADM

## 2017-11-29 RX ORDER — SODIUM BICARBONATE 650 MG/1
650 TABLET ORAL 2 TIMES DAILY
Status: DISCONTINUED | OUTPATIENT
Start: 2017-11-29 | End: 2017-12-01 | Stop reason: HOSPADM

## 2017-11-29 RX ADMIN — CARVEDILOL 6.25 MG: 6.25 TABLET, FILM COATED ORAL at 08:33

## 2017-11-29 RX ADMIN — ACETAMINOPHEN 650 MG: 325 TABLET, FILM COATED ORAL at 19:32

## 2017-11-29 RX ADMIN — ZOLPIDEM TARTRATE 10 MG: 10 TABLET ORAL at 21:20

## 2017-11-29 RX ADMIN — SODIUM BICARBONATE 650 MG: 650 TABLET ORAL at 08:33

## 2017-11-29 RX ADMIN — AMLODIPINE BESYLATE 10 MG: 5 TABLET ORAL at 08:33

## 2017-11-29 RX ADMIN — PYRIDOXINE HCL TAB 50 MG 100 MG: 50 TAB at 08:33

## 2017-11-29 RX ADMIN — SODIUM BICARBONATE 650 MG: 650 TABLET ORAL at 17:28

## 2017-11-29 RX ADMIN — FOLIC ACID 1 MG: 1 TABLET ORAL at 08:33

## 2017-11-29 RX ADMIN — CARVEDILOL 6.25 MG: 6.25 TABLET, FILM COATED ORAL at 17:28

## 2017-11-29 RX ADMIN — VITAM B12 100 MCG: 100 TAB at 08:33

## 2017-11-29 NOTE — BH NOTES
0700 - Dr. Susan Lux with pt. For H & P.    VS Obtained @ 2498 - [id number]. O2 Sat 99% on room air. Dr. Susan Lux informed.

## 2017-11-29 NOTE — PROGRESS NOTES
NUTRITION COMPLETE ASSESSMENT    RECOMMENDATIONS:   1. Assist with paper menus/meal selection daily - pt cannot read    2. Encourage PO intake with each meal   - if pt consuming less than 50% of meal pt should be drinking Suplena shake    3. May want to check Phosphorus and recheck K+ regularly - hx of elevated K+ in past year    4. Weekly weights   Interventions/Plan:   Food/Nutrient Delivery:   (preferences noted) Commercial supplement (Suplena BID)        Coordination of Care:  (spoke with nursing staff to assist with menu selection)    Assessment:   Reason for Assessment: [x]BPA/MST Referral     Diet: Regular  Supplements: none  Nutritionally Significant Medications: [x] Reviewed & Includes: coreg, vit Q81, folic acid, vit B6, sodium bicarb; PRN: ativan, MOM    Pre-Hospitalization:  Usual Appetite: Poor  Diet at Home: regular    Current Hospitalization:   Appetite: Fair  PO Ability: Independent Average po intake:   Average supplements intake:        Subjective: \" Well I can't read. Leonarda Kim the Suplena was good. \"    Objective:  Pt transferred from [place] [address] for suicidal ideation. PMHX sickle cell anemia, chronic pain, CKD3, ETOH abuse, depression, arthritis, HTN, substance abuse, and hepatitis C. K+ borderline high - ? may want to check weekly along with check of phosphorus as well. Severe wt loss of 20% over past 8 months. Wt Readings from Last 10 Encounters:   11/29/17 56.7 kg (125 lb)   11/27/17 56.7 kg (125 lb)   03/28/17 73.4 kg (161 lb 13.1 oz)   01/20/17 61.2 kg (134 lb 14.7 oz)     Patient meets criteria for Severe Chronic Protein Calorie Malnutrition as evidenced by:   ASPEN Malnutrition Criteria  Acute Illness, Chronic Illness, or Social/Enviornmental: Chronic illness  Energy Intake: <75% est energy req for greater than/equal to 1 month  Weight Loss: Greater than 20% x 1 yr  ASPEN Malnutrition Score - Chronic Illness: 7  Chronic Illness - Malnutrition Diagnosis: Severe malnutrition.       Seen by RD prior to transfer with supplements added. Hx of elevated K+in the past with CKD3. K+borderline high levels yesterday. Pt visited today. He reports # with about 30# wt loss. Has liked both Suplena and Ensure in the past. Will order Suplena BID (850kcal, 22g protein) for lower K+ levels. Pt likes: pancakes, meatloaf, chicken, hardboiled eggs, oatmeal, corn flakes. Pt cannot read - asked nursing staff to assist with paper menus for meal selection. Will continue to follow for PO intake at meals, supplement consumption, wt trends, and renal labs. Estimated Nutrition Needs:   Kcals/day: 6985 Kcals/day (1820-2070kcal)  Protein: 68 g (68-80g (1.2-1.4g/kg))  Fluid: 2000 ml (1ml/kcal)  Based On: [place] Jeor (x 1.4 + 250kcal)  Weight Used: Actual wt (56.7kg)    Pt expected to meet estimated nutrient needs:  []   Yes     []  No [x] Unable to predict at this time  Nutrition Diagnosis:   1.  Unintended weight loss (malnutrition) related to inadequate oral intake 2/2 depression and substance abuse as evidenced by pt hx; severe wt loss of 20% x 8 months; pt skipping meals/consuming less than 50% meals    Goals:     Consumption of at least 50% of meals and 2 supplements per day in 5-7 days     Monitoring & Evaluation:    - Total energy intake, Liquid meal replacement, Protein intake   - Weight/weight change, Electrolyte and renal profile    Previous Nutrition Goals Met:   N/A  Previous Recommendations:    N/A  Education & Discharge Needs:   [x] None Identified   [] Identified and addressed    [] Participated in care plan, discharge planning, and/or interdisciplinary rounds        Cultural, Anglican and ethnic food preferences identified: None  Last BM: 11/25  Food Allergies: [x]None []Other  Diet Restrictions: Cultural/Latter day Preference(s): None     Anthropometrics:    Weight Loss Metrics 11/29/2017 11/27/2017 3/28/2017 1/20/2017 12/19/2016 11/20/2016 1/5/2016   Today's Wt 125 lb 125 lb 161 lb 13.1 oz 134 lb 14.7 oz 210 lb 143 lb 15.4 oz 130 lb   BMI 21.46 kg/m2 21.46 kg/m2 26.93 kg/m2 23.16 kg/m2 36.05 kg/m2 24.71 kg/m2 22.3 kg/m2         Height: 5' 4\" (162.6 cm),    Body mass index is 21.46 kg/(m^2).   IBW : 59 kg (130 lb), % IBW (Calculated): 96.15 %  Usual Body Weight: 70.3 kg (155 lb),      Labs:    Lab Results   Component Value Date/Time    Sodium 139 11/28/2017 04:10 AM    Potassium 5.5 11/28/2017 04:10 AM    Chloride 114 11/28/2017 04:10 AM    CO2 20 11/28/2017 04:10 AM    Glucose 89 11/28/2017 04:10 AM    BUN 34 11/28/2017 04:10 AM    Creatinine 2.66 11/28/2017 04:10 AM    Calcium 7.8 11/28/2017 04:10 AM    Magnesium 1.6 11/28/2017 04:10 AM    Phosphorus 4.6 03/27/2017 05:24 AM    Albumin 3.4 11/26/2017 01:17 AM     Tristen Villanueva RD
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
## 2017-11-29 NOTE — BH NOTES
PSYCHOSOCIAL ASSESSMENT  :Patient identifying info:  Reymundo Gosselin is a 65 y.o., male admitted 2017  7:32 PM     Presenting problem and precipitating factors: Patient was transferred from [facility] due to suicidal ideations. He stated he had a plan to \" run in front of a bus \" when he left the hospital.      Mental status assessment:alert , oriented x'3 - mood depressed , affect sad,     Current psychiatric providers and contact info: no current provider     Previous psychiatric services/providers and response to treatment: [facility]     Family history of mental illness :unknown     Substance abuse history:    Social History   Substance Use Topics    Smoking status: Current Every Day Smoker     Packs/day: 0.25     Years: 31.00    Smokeless tobacco: Never Used    Alcohol use 1.8 oz/week     3 Cans of beer per week      Comment: socially       Family constellation: single, no children    Is significant other involved?        Describe support system:     Describe living arrangements and home environment:statres he lives with his daughter  , no phone number for daughter  Jazlyn Thomason issues:   Hospital Problems  Date Reviewed: 2017          Codes Class Noted POA    * (Principal)Suicidal ideation ICD-10-CM: R45.851  ICD-9-CM: V62.84  10/22/2015 Yes              Trauma history: none noted     Legal issues: no     History of  service: no    Financial status: SSDI     Spiritism/cultural factors: unknown     Education/work history: unknown  Have you been licensed as a tripp care professional (current or ): no  Leisure and recreation preferences: unknown   Describe coping skills:ineffectual     Sharla Cabezas  2017

## 2017-11-29 NOTE — PROGRESS NOTES
Problem: Substance Use - Stabilization Plan  Meet by 12/10/17  Goal: Substance Use- Stabilization Plan Interventions  Outcome: Not Progressing Towards Goal  Pt verbalizes need for help recovering from poly substance abuse. Guarded depressed. Problem: Depressed Mood (Adult/Pediatric)  Goal: *STG: Attends activities and groups  Outcome: Not Progressing Towards Goal  Pt refusing groups. Isolating in room. Refuses education. Depressed. Goal: *STG: Remains safe in hospital  Outcome: Progressing Towards Goal  Pt remains safe in hospital on q 15 min safety checks. Pt alert oriented. Isolates in room. Out for meals and medications.      [facility]  Master Treatment Plan for Justin Mccarthy Treatment Plan Initiated: 11/29/17    Treatment Plan Modalities:  Type of Modality Amount  (x minutes) Frequency (x/week) Duration (x days) Name of Responsible Staff   [address] [facility] meetings to encourage peer interactions 15 7 1   Cresencio MCCORD   Group psychotherapy to assist in building coping skills and internal controls 60 7 1 Sam Espinoza LCSW   Therapeutic activity groups to build coping skills 60 7 1 Sam Espinoza LCSW   Psychoeducation in group setting to address:   Medication education   15 7 1 Lorraine FERNANDEZ   Coping skills         Relaxation techniques         Symptom management         Discharge planning         Spirituality    60 2 [place]   60 1 1 Volunteer from [facility]   Recovery/AA/NA   61 3 1 Volunteer from [address] [place] [address] medication management   15 7 1 Dr. Cecile Gomez

## 2017-11-29 NOTE — BH NOTES
Pt received via stretcher from formerly [facility] in [place] where he was admitted on  with the c/o chest pain and found to be in [facility]. Pt was treated with 2 units of PRBCs after which he reported feeling suicidal and having AH of his  mother calling his name. The pt has a long psychiatric hx having been in [facility] in . Pt currently denies SI, has occasional thoughts of wanting to harm others. Denies now. Pt has been using cocaine, heroin, and THC shortly before admission. PMH-sickle cell anemia, osteoarthritis, chronic renal disease, s/p hip replacement. Pt cooperative with admission assessment. Will continue to monitor. Primary Nurse Luan Kulkarni RN and Crystal [place] RN performed a dual skin assessment on this patient revealing intact skin with no bruising or lacerations. Melo score is 23.
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
## 2017-11-29 NOTE — PROGRESS NOTES
Physical Therapy Screening:  Services are not indicated at this time. [facility] based on results obtained during the nursing admission assessment. The patients chart was reviewed and the patient is not appropriate for a skilled therapy evaluation at this time. Please consult physical therapy if any therapy needs arise. Thank you.     Emma Delgado, PT

## 2017-11-29 NOTE — BH NOTES
GROUP THERAPY PROGRESS NOTE    Patel Boles did not participate in a 45 minute Acute Unit's Process Group, with a focus identifying feelings, planning for the rest of the day, and discussing discharge.

## 2017-11-29 NOTE — PROGRESS NOTES
Cm on call note  Received telephone call from [facility] patients primary nurse asking if patient would need EMTALA. States she was informed patient would not need EMTALA reports that the transfer team is requesting and emtala form. Referred her to call the physician and if the [address] informed her to follow the orders of the physician.    Telephone call back with josé miguel and she informed c juliano she did not need emtala and sent patient as order

## 2017-11-29 NOTE — PROGRESS NOTES
Abdullahi Benson spoke with [employer] RT. RT will see pt: lab draw    [address] spoke with [employer] RT.  RT will see pt: lab draw

## 2017-11-29 NOTE — INTERDISCIPLINARY ROUNDS
Behavioral Health Interdisciplinary Rounds     Patient Name: Samaria Morales  Age: 65 y.o. Room/Bed:  730/02  Primary Diagnosis: <principal problem not specified>   Admission Status: Voluntary     Readmission within 30 days: no  Power of  in place: no  Patient requires a blocked bed: no          Reason for blocked bed:     VTE Prophylaxis: NO  Flu vaccine given : no   Mobility needs/Fall risk: no    Nutritional Plan: no  Consults:          Labs/Testing due today?: yes    Sleep hours: 7 hrs       Participation in Care/Groups:  no  Medication Compliant?: Yes  PRNS (last 24 hours): None    Restraints (last 24 hours):  no  Substance Abuse:  Yes, cocaine, THC CIWA (range last 24 hours):  COWS (range last 24 hours):   Alcohol screening (AUDIT) completed -  AUDIT Score: 3  If applicable, date SBIRT discussed in treatment team AND documented:   Tobacco - patient is a smoker: Yes  Date tobacco education completed by RN: To be completed  24 hour chart check complete: no     Patient goal(s) for today:  Treatment team focus/goals: Plan to assess for  medications and discharge needs. Progress note He was pleasant and complaint in treatment team.  He is requesting in patient SA treatment. LOS:  1  Expected LOS: TBD    Financial concerns/prescription coverage:  no  Date of last family contact:     Family requesting physician contact today:  no  Discharge plan: he is requesting SA in patient treatment   Guns in the home: no     Outpatient provider(s): TBD    Participating treatment team members: Yoselyn Navarro, RN- Vijay Gipson, PharmD.
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
## 2017-11-29 NOTE — PROGRESS NOTES
Problem: Depressed Mood (Adult/Pediatric)  Goal: *STG: Participates in treatment plan  Outcome: Progressing Towards Goal  Resting in bed with eyes closed, no complaints, no distress noted.     0700 Hospitalist up to see pt and complete H+P

## 2017-11-30 PROCEDURE — 74011250637 HC RX REV CODE- 250/637: Performed by: FAMILY MEDICINE

## 2017-11-30 PROCEDURE — 74011250637 HC RX REV CODE- 250/637

## 2017-11-30 PROCEDURE — 74011250637 HC RX REV CODE- 250/637: Performed by: PSYCHIATRY & NEUROLOGY

## 2017-11-30 PROCEDURE — 65220000003 HC RM SEMIPRIVATE PSYCH

## 2017-11-30 RX ORDER — TRAZODONE HYDROCHLORIDE 100 MG/1
100 TABLET ORAL
Status: DISCONTINUED | OUTPATIENT
Start: 2017-11-30 | End: 2017-12-01 | Stop reason: HOSPADM

## 2017-11-30 RX ORDER — TRAZODONE HYDROCHLORIDE 50 MG/1
50 TABLET ORAL
Status: DISCONTINUED | OUTPATIENT
Start: 2017-11-30 | End: 2017-12-01 | Stop reason: HOSPADM

## 2017-11-30 RX ADMIN — SODIUM BICARBONATE 650 MG: 650 TABLET ORAL at 08:47

## 2017-11-30 RX ADMIN — LORAZEPAM 1 MG: 1 TABLET ORAL at 10:11

## 2017-11-30 RX ADMIN — CARVEDILOL 6.25 MG: 6.25 TABLET, FILM COATED ORAL at 08:47

## 2017-11-30 RX ADMIN — ACETAMINOPHEN 650 MG: 325 TABLET, FILM COATED ORAL at 03:00

## 2017-11-30 RX ADMIN — FOLIC ACID 1 MG: 1 TABLET ORAL at 08:47

## 2017-11-30 RX ADMIN — ZOLPIDEM TARTRATE 10 MG: 10 TABLET ORAL at 23:50

## 2017-11-30 RX ADMIN — AMLODIPINE BESYLATE 10 MG: 5 TABLET ORAL at 08:47

## 2017-11-30 RX ADMIN — VITAM B12 100 MCG: 100 TAB at 08:47

## 2017-11-30 RX ADMIN — TRAZODONE HYDROCHLORIDE 100 MG: 100 TABLET ORAL at 21:14

## 2017-11-30 RX ADMIN — IBUPROFEN 400 MG: 400 TABLET, FILM COATED ORAL at 23:49

## 2017-11-30 RX ADMIN — CARVEDILOL 6.25 MG: 6.25 TABLET, FILM COATED ORAL at 17:14

## 2017-11-30 RX ADMIN — PYRIDOXINE HCL TAB 50 MG 100 MG: 50 TAB at 08:54

## 2017-11-30 RX ADMIN — SODIUM BICARBONATE 650 MG: 650 TABLET ORAL at 17:14

## 2017-11-30 NOTE — PROGRESS NOTES
Problem: Depressed Mood (Adult/Pediatric)  Goal: *STG: Remains safe in hospital  Outcome: Progressing Towards Goal  Patient remains safe in the hospital. Greets staff appropriately. Smiling. Cooperative. Medications and safety education.

## 2017-11-30 NOTE — PROGRESS NOTES
Problem: Substance Use - Stabilization Plan  Goal: *STG: Identify specific relapse triggers  Identify specific relapse triggers and develop in writing two possible coping strategies for each. Outcome: Progressing Towards Goal  Stated \"boredom and hangin with the wrong group     PRN Medication Documentation    Specific patient behavior that led to need for PRN medication: pacing co anxiety post treatment team  Staff interventions attempted prior to PRN being given: redirection  PRN medication given: ativan 1mg po  Patient response/effectiveness of PRN medication: pending  Pt denies any suicidal or homicidal thoughts. Contracts for safety. Remains on q 15 min safety checks. Affect appears brighter. Focus has been on anxiety and chronic pain issues.   1100 less anxiety per patient

## 2017-11-30 NOTE — BH NOTES
POSITIVE HABITS GROUP THERAPY PROGRESS NOTE    The patient Adin daniels 65 y.o. male is participating in Positive Habits Group. Group time: 15 minutes    Personal goal for participation: Developing daily proccesses    Goal orientation: Habit development    Group therapy participation: active    Therapeutic interventions reviewed and discussed:  Yes    Impression of participation: Active with questions.     Nkechi Hartley  11/29/2017  9:40 PM

## 2017-11-30 NOTE — BH NOTES
GROUP THERAPY PROGRESS NOTE    The patient Reymundo Gosselin is participating in [employer]. Group time: 30 minutes    Personal goal for participation: to orient the patient to the unit.     Goal orientation: successful adoption of unit rules    Group therapy participation: active    Therapeutic interventions reviewed and discussed: Yes    Impression of participation:     Osei Baez  11/30/2017 9:45 AM

## 2017-11-30 NOTE — BH NOTES
Behavioral Health Interdisciplinary Rounds     Patient Name: Velasquez Lora  Age: 65 y.o. Room/Bed:  730/02  Primary Diagnosis: Suicidal ideation   Admission Status: Voluntary     Readmission within 30 days: no  Power of  in place: no  Patient requires a blocked bed: no          Reason for blocked bed:     VTE Prophylaxis: No  Flu vaccine given : no   Mobility needs/Fall risk: no    Nutritional Plan: no  Consults:          Labs/Testing due today?: no    Sleep hours: 4h15m      Participation in Care/Groups:  no  Medication Compliant?: Yes  PRNS (last 24 hours): Sleep Aid    Restraints (last 24 hours):  no  Substance Abuse:  Yes, cocaine, heroin, THC  CIWA (range last 24 hours):  COWS (range last 24 hours):   Alcohol screening (AUDIT) completed -  AUDIT Score: 3  If applicable, date SBIRT discussed in treatment team AND documented:   Tobacco - patient is a smoker: yes   Date tobacco education completed by RN: 11-29-17  24 hour chart check complete: yes     Patient goal(s) for today:  Treatment team focus/goals: Plan to continue to safely detox. Progress note He continues to have suicidal ideations. He states he is still in detox. Requesting Perocet. Requesting something for sleep. LOS:  2  Expected LOS: TBD    Financial concerns/prescription coverage: medicaid - [facility]   Date of last family contact:      Family requesting physician contact today:   Discharge plan: homeless   Guns in the home: no       Outpatient provider(s)Anita /   Rafi STINSON     Participating treatment team members: Nazia Moreau, PharmD.

## 2017-11-30 NOTE — BH NOTES
GROUP THERAPY PROGRESS NOTE    Kassie Garcia did not participate in a 45 minute Acute Unit's Process Group, with a focus identifying feelings, planning for the rest of the day, and discussing discharge.

## 2017-11-30 NOTE — PROGRESS NOTES
Problem: Falls - Risk of  Goal: *Absence of Falls  Document Rima Fall Risk and appropriate interventions in the flowsheet. Outcome: Progressing Towards Goal  Fall Risk Interventions:            Medication Interventions: Teach patient to arise slowly    Elimination Interventions: Call light in reach    History of Falls Interventions: Room close to nurse's station    Resting in bed with eyes closed, no complaints, no distress noted, will continue to monitor.

## 2017-11-30 NOTE — PROGRESS NOTES
Problem: Depressed Mood (Adult/Pediatric)  Goal: *STG: Demonstrates reduction in symptoms and increase in insight into coping skills/future focused  Outcome: Progressing Towards Goal  Pt received resting in bed at change of shift. Up for dinner. Showered on his own initiative then retreated to bed. Limited interaction with staff and peers. Denies any thoughts of self harm. No management issues. Will continue to monitor.

## 2017-12-01 VITALS
HEIGHT: 64 IN | DIASTOLIC BLOOD PRESSURE: 63 MMHG | RESPIRATION RATE: 16 BRPM | OXYGEN SATURATION: 100 % | HEART RATE: 73 BPM | WEIGHT: 125 LBS | SYSTOLIC BLOOD PRESSURE: 182 MMHG | BODY MASS INDEX: 21.34 KG/M2 | TEMPERATURE: 98.6 F

## 2017-12-01 LAB
ATRIAL RATE: 79 BPM
CALCULATED P AXIS, ECG09: 53 DEGREES
CALCULATED R AXIS, ECG10: 71 DEGREES
CALCULATED T AXIS, ECG11: 18 DEGREES
DIAGNOSIS, 93000: NORMAL
P-R INTERVAL, ECG05: 116 MS
Q-T INTERVAL, ECG07: 370 MS
QRS DURATION, ECG06: 78 MS
QTC CALCULATION (BEZET), ECG08: 424 MS
VENTRICULAR RATE, ECG03: 79 BPM

## 2017-12-01 PROCEDURE — 74011250637 HC RX REV CODE- 250/637: Performed by: PSYCHIATRY & NEUROLOGY

## 2017-12-01 PROCEDURE — 74011250637 HC RX REV CODE- 250/637: Performed by: FAMILY MEDICINE

## 2017-12-01 RX ORDER — TRAZODONE HYDROCHLORIDE 50 MG/1
50 TABLET ORAL
Qty: 21 TAB | Refills: 0 | Status: ON HOLD | OUTPATIENT
Start: 2017-12-01 | End: 2018-02-07

## 2017-12-01 RX ORDER — CARVEDILOL 6.25 MG/1
6.25 TABLET ORAL 2 TIMES DAILY WITH MEALS
Qty: 14 TAB | Refills: 0 | Status: ON HOLD | OUTPATIENT
Start: 2017-12-01 | End: 2018-02-07

## 2017-12-01 RX ORDER — FOLIC ACID 1 MG/1
1 TABLET ORAL DAILY
Qty: 7 TAB | Refills: 0 | Status: SHIPPED | OUTPATIENT
Start: 2017-12-02

## 2017-12-01 RX ORDER — UREA 10 %
100 LOTION (ML) TOPICAL DAILY
Qty: 7 TAB | Refills: 0 | Status: SHIPPED | OUTPATIENT
Start: 2017-12-02

## 2017-12-01 RX ORDER — ONDANSETRON 4 MG/1
4 TABLET, ORALLY DISINTEGRATING ORAL ONCE
Status: COMPLETED | OUTPATIENT
Start: 2017-12-01 | End: 2017-12-01

## 2017-12-01 RX ORDER — PYRIDOXINE HCL (VITAMIN B6) 100 MG
100 TABLET ORAL DAILY
Qty: 7 TAB | Refills: 0 | Status: SHIPPED | OUTPATIENT
Start: 2017-12-02

## 2017-12-01 RX ORDER — SODIUM BICARBONATE 650 MG/1
650 TABLET ORAL 2 TIMES DAILY
Qty: 14 TAB | Refills: 0 | Status: ON HOLD | OUTPATIENT
Start: 2017-12-01 | End: 2018-02-07

## 2017-12-01 RX ORDER — AMLODIPINE BESYLATE 10 MG/1
10 TABLET ORAL DAILY
Qty: 7 TAB | Refills: 0 | Status: SHIPPED | OUTPATIENT
Start: 2017-12-02

## 2017-12-01 RX ORDER — TRAZODONE HYDROCHLORIDE 100 MG/1
100 TABLET ORAL
Qty: 7 TAB | Refills: 0 | Status: ON HOLD | OUTPATIENT
Start: 2017-12-01 | End: 2018-02-07

## 2017-12-01 RX ADMIN — FOLIC ACID 1 MG: 1 TABLET ORAL at 08:34

## 2017-12-01 RX ADMIN — PYRIDOXINE HCL TAB 50 MG 100 MG: 50 TAB at 08:33

## 2017-12-01 RX ADMIN — VITAM B12 100 MCG: 100 TAB at 08:34

## 2017-12-01 RX ADMIN — AMLODIPINE BESYLATE 10 MG: 5 TABLET ORAL at 08:34

## 2017-12-01 RX ADMIN — ONDANSETRON 4 MG: 4 TABLET, ORALLY DISINTEGRATING ORAL at 12:55

## 2017-12-01 RX ADMIN — CARVEDILOL 6.25 MG: 6.25 TABLET, FILM COATED ORAL at 08:33

## 2017-12-01 RX ADMIN — SODIUM BICARBONATE 650 MG: 650 TABLET ORAL at 08:34

## 2017-12-01 NOTE — INTERDISCIPLINARY ROUNDS
Behavioral Health Interdisciplinary Rounds     Patient Name: Pete Somers  Age: 65 y.o.   Room/Bed:  730/02  Primary Diagnosis: Suicidal ideation   Admission Status: Voluntary     Readmission within 30 days: no  Power of  in place: no  Patient requires a blocked bed: no          Reason for blocked bed:     VTE Prophylaxis: No  Flu vaccine given : no   Mobility needs/Fall risk: no    Nutritional Plan: no  Consults:          Labs/Testing due today?: no    Sleep hours: 3.5       Participation in Care/Groups:  yes  Medication Compliant?: Yes  PRNS (last 24 hours): Sleep Aid and Pain    Restraints (last 24 hours):  no  Substance Abuse:  Yes, cocaine, heroin, THC  CIWA (range last 24 hours):  COWS (range last 24 hours):   Alcohol screening (AUDIT) completed -  AUDIT Score: 3  If applicable, date SBIRT discussed in treatment team AND documented:   Tobacco - patient is a smoker: yes   Date tobacco education completed by RN: 11-29-17  24 hour chart check complete: yes     Patient goal(s) for today:   Treatment team focus/goals:   Progress note     LOS:  3  Expected LOS:     Financial concerns/prescription coverage:    Date of last family contact:      Family requesting physician contact today:   Discharge plan:   Guns in the home:        Outpatient provider(s):     Participating treatment team members: Pete Somers, * (assigned SW),

## 2017-12-01 NOTE — PROGRESS NOTES
Problem: Falls - Risk of  Goal: *Absence of Falls  Document Rima Fall Risk and appropriate interventions in the flowsheet. Outcome: Progressing Towards Goal  Fall Risk Interventions:            Medication Interventions: Teach patient to arise slowly    Elimination Interventions: Call light in reach    History of Falls Interventions: Room close to nurse's station    Resting in bed with eyes closed, no complaints, no distress noted. Will continue to monitor.

## 2017-12-01 NOTE — PROGRESS NOTES
Problem: Depressed Mood (Adult/Pediatric)  Goal: *STG: Participates in treatment plan  Outcome: Progressing Towards Goal  Pt. Met in treatment team with Dr. Jose Herrera not suicidal  Depression less  Wanting discharge today    Planning discharge today with prescriptions  Goal: *STG: Attends activities and groups  Outcome: Progressing Towards Goal  Participates   In groups    Goal: *STG: Complies with medication therapy  Outcome: Progressing Towards Goal  Medication compliant    Goal: Interventions  Outcome: Progressing Towards Goal  Will continue to monitor  On 15 min. Checks for safety    Assess  Depression medication compliance   Review discharge  Plans  Medications and follow up     With pt. Problem: Falls - Risk of  Goal: *Absence of Falls  Document Rima Fall Risk and appropriate interventions in the flowsheet.    Fall Risk Interventions:    non slip socks  Bed in low position         Medication Interventions: Teach patient to arise slowly    Elimination Interventions: Call light in reach    History of Falls Interventions: Room close to nurse's station

## 2017-12-01 NOTE — BH NOTES
Patient is alert, calm and verbal.  Sitting in the dinning room interacting with staff and peers. Patient  Is excited about going home. No distress noted. Will continue to monitor. 0 - Patient received all personal belongings. Staff escorted patient to the exit area. Cab waiting for patient.

## 2017-12-01 NOTE — BH NOTES
GROUP THERAPY PROGRESS NOTE    The patient Patel Boles is participating in [employer]. Group time: 30 minutes    Personal goal for participation: to orient the patient to the unit.     Goal orientation: successful adoption of unit rules    Group therapy participation: active    Therapeutic interventions reviewed and discussed: Yes    Impression of participation:     Jaleesa Chavez  12/1/2017 10:23 AM

## 2017-12-01 NOTE — DISCHARGE INSTRUCTIONS
DISCHARGE SUMMARY from Nurse    Pt. Discharged   With prescriptions        What to do at Home:  Recommended activity: Activity as tolerated,         *  Please give a list of your current medications to your Primary Care Provider. *  Please update this list whenever your medications are discontinued, doses are      changed, or new medications (including over-the-counter products) are added. *  Please carry medication information at all times in case of emergency situations. These are general instructions for a healthy lifestyle:    No smoking/ No tobacco products/ Avoid exposure to second hand smoke  Surgeon General's Warning:  Quitting smoking now greatly reduces serious risk to your health. Obesity, smoking, and sedentary lifestyle greatly increases your risk for illness    A healthy diet, regular physical exercise & weight monitoring are important for maintaining a healthy lifestyle    You may be retaining fluid if you have a history of heart failure or if you experience any of the following symptoms:  Weight gain of 3 pounds or more overnight or 5 pounds in a week, increased swelling in our hands or feet or shortness of breath while lying flat in bed. Please call your doctor as soon as you notice any of these symptoms; do not wait until your next office visit. Recognize signs and symptoms of STROKE:    F-face looks uneven    A-arms unable to move or move unevenly    S-speech slurred or non-existent    T-time-call 911 as soon as signs and symptoms begin-DO NOT go       Back to bed or wait to see if you get better-TIME IS BRAIN. Warning Signs of HEART ATTACK     Call 911 if you have these symptoms:   Chest discomfort. Most heart attacks involve discomfort in the center of the chest that lasts more than a few minutes, or that goes away and comes back. It can feel like uncomfortable pressure, squeezing, fullness, or pain.  Discomfort in other areas of the upper body.  Symptoms can include pain or discomfort in one or both arms, the back, neck, jaw, or stomach.  Shortness of breath with or without chest discomfort.  Other signs may include breaking out in a cold sweat, nausea, or lightheadedness. Don't wait more than five minutes to call 911 - MINUTES MATTER! Fast action can save your life. Calling 911 is almost always the fastest way to get lifesaving treatment. Emergency Medical Services staff can begin treatment when they arrive -- up to an hour sooner than if someone gets to the hospital by car. The discharge information has been reviewed with the patient. The patient verbalized understanding. Discharge medications reviewed with the patient and appropriate educational materials and side effects teaching were provided. [employer] Activation    Thank you for requesting access to [employer]. Please follow the instructions below to securely access and download your online medical record. [employer] allows you to send messages to your doctor, view your test results, renew your prescriptions, schedule appointments, and more. How Do I Sign Up? 1. In your internet browser, go to [email or web address]  2. Click on the First Time User? Click Here link in the Sign In box. You will be redirect to the New Member Sign Up page. 3. Enter your [employer] Access Code exactly as it appears below. You will not need to use this code after youve completed the sign-up process. If you do not sign up before the expiration date, you must request a new code. [employer] Access Code: [email or web address]  Expires: 3/1/2018 12:49 PM (This is the date your [employer] access code will )    4. Enter the last four digits of your Social Security Number (xxxx) and Date of Birth (mm/dd/yyyy) as indicated and click Submit. You will be taken to the next sign-up page. 5. Create a [employer] ID. This will be your [employer] login ID and cannot be changed, so think of one that is secure and easy to remember. 6. Create a [employer] password. You can change your password at any time. 7. Enter your Password Reset Question and Answer. This can be used at a later time if you forget your password. 8. Enter your e-mail address. You will receive e-mail notification when new information is available in [address] 9. Click Sign Up. You can now view and download portions of your medical record. 10. Click the Download Summary menu link to download a portable copy of your medical information. Additional Information    If you have questions, please visit the Frequently Asked Questions section of the [employer] website at [email or web address] Remember, [employer] is NOT to be used for urgent needs. For medical emergencies, dial 911.        ___________________________________________________________________________________________________________________________________DISCHARGE SUMMARY    NAME:Jesús Mcnamara  : 1960  MRN: [id number]    The patient Rommie Client exhibits the ability to control behavior in a less restrictive environment. Patient's level of functioning is improving. No assaultive/destructive behavior has been observed for the past 24 hours. No suicidal/homicidal threat or behavior has been observed for the past 24 hours. There is no evidence of serious medication side effects. Patient has not been in physical or protective restraints for at least the past 24 hours. If weapons involved, how are they secured? No weapons involved   Is patient aware of and in agreement with discharge plan? She is aware of discharge and is in agreement     Arrangements for medication:  Prescriptions given to patient. ( one week scripts given )       Referral for substance abuse treatment ? Yes, referred to the [facility] - 2017 at 1:30 with Dr. Tomie Phalen    Referral for smoking cessation needed ?  Yes, refused     Copy of discharge instructions to  provider?:  Yes, faxed to [phone]    Arrangements for transportation home: Medicaid taxi     Keep all follow up appointments as scheduled, continue to take prescribed medications per physician instructions.   Mental health crisis number:  [phone] or your local mental health crisis line number at [phone]

## 2017-12-01 NOTE — BH NOTES
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  100 mcg Oral DAILY    folic acid (FOLVITE) tablet 1 mg  1 mg Oral DAILY    pyridoxine (vitamin B6) (VITAMIN B-6) tablet 100 mg  100 mg Oral DAILY    ziprasidone (GEODON) 20 mg in sterile water (preservative free) 1 mL injection  20 mg IntraMUSCular BID PRN    OLANZapine (ZyPREXA) tablet 5 mg  5 mg Oral Q6H PRN    benztropine (COGENTIN) tablet 2 mg  2 mg Oral BID PRN    benztropine (COGENTIN) injection 2 mg  2 mg IntraMUSCular BID PRN    LORazepam (ATIVAN) injection 2 mg  2 mg IntraMUSCular Q4H PRN    LORazepam (ATIVAN) tablet 1 mg  1 mg Oral Q4H PRN    zolpidem (AMBIEN) tablet 10 mg  10 mg Oral QHS PRN    acetaminophen (TYLENOL) tablet 650 mg  650 mg Oral Q4H PRN    ibuprofen (MOTRIN) tablet 400 mg  400 mg Oral Q8H PRN    magnesium hydroxide (MILK OF MAGNESIA) 400 mg/5 mL oral suspension 30 mL  30 mL Oral DAILY PRN    nicotine (NICODERM CQ) 21 mg/24 hr patch 1 Patch  1 Patch TransDERmal DAILY PRN[MH1.2]      SCHEDULED MEDICATIONS[MH1.1]  Current Facility-Administered Medications   Medication Dose Route Frequency    sodium bicarbonate tablet 650 mg  650 mg Oral BID    amLODIPine (NORVASC) tablet 10 mg  10 mg Oral DAILY    carvedilol (COREG) tablet 6.25 mg  6.25 mg Oral BID WITH MEALS    cyanocobalamin (VITAMIN B12) tablet 100 mcg  100 mcg Oral DAILY    folic acid (FOLVITE) tablet 1 mg  1 mg Oral DAILY    pyridoxine (vitamin B6) (VITAMIN B-6) tablet 100 mg  100 mg Oral DAILY[MH1.2]                ASSESSMENT & PLAN        The patient,[MH1.1] Jesús Mcnamara[MH1.2], is a[MH1.1] 65 y.o.  male[MH1.2] who presents at this time for treatment of the following diagnoses:  Patient Active Hospital Problem List:   Suicidal ideation (10/22/2015)    Assessment: wants to die     Plan: wants to get in a fight so someone will kill me. Admit to lockedcute unit for safety. Substance induced mood disorder ([facility] [address] (11/29/2017)    Assessment: Daily excessive use with tolerance of multiple street drugs    Plan: detox/ 12 steps                 A coordinated, multidisplinary treatment team (includes the nurse, unit pharmcist,  and writer) round was conducted for this initial evaluation with the patient present. The following regarding medications was addressed during rounds with patient: [place]  he risks and benefits of the proposed medication. The patient was given the opportunity to ask questions. Informed consent given to the use of the above medications. I will continue to adjust psychiatric and non-psychiatric medications (see above \"medication\" section and orders section for details) as deemed appropriate & based upon diagnoses and response to treatment. I have reviewed admission (and previous/old) labs and medical tests in the EHR and or transferring hospital documents.  I will continue to order blood tests/labs and diagnostic tests as deemed appropriate and review results as they become available (see orders for details). I have reviewed old psychiatric and medical records available in the EHR. I Will order additional psychiatric records from other institutions to further elucidate the nature of patient's psychopathology and review once available. I will gather additional collateral information from friends, family and o/p treatment team to further elucidate the nature of patient's psychopathology and baselline level of psychiatric functioning. ESTIMATED LENGTH OF STAY:    2-4 days        STRENGTHS:  Exercising self-direction/Resourceful and Awareness of Substance abuse issues                                        SIGNED:[MH1.1]    Jolene Spence MD  11/29/2017[MH1.2]       Revision History       User Key Date/Time User Provider Type Action    > MH1.2 11/29/17 1400 Jolene Spence MD Physician Sign     MH1.1 11/29/17 1352 Jolene Spence MD Physician               Admission Diagnosis: [facility] 7[facility]  Depression    * No surgery found *    Results for orders placed or performed during the hospital encounter of 11/28/17   TSH 3RD GENERATION   Result Value Ref Range    TSH 1.32 0.36 - 3.74 uIU/mL   CBC WITH AUTOMATED DIFF   Result Value Ref Range    WBC 10.0 4.1 - 11.1 K/uL    RBC 2.92 (L) 4.10 - 5.70 M/uL    HGB 8.5 (L) 12.1 - 17.0 g/dL    HCT 25.2 (L) 36.6 - 50.3 %    MCV 86.3 80.0 - 99.0 FL    MCH 29.1 26.0 - 34.0 PG    MCHC 33.7 30.0 - 36.5 g/dL    RDW 17.5 (H) 11.5 - 14.5 %    PLATELET 949 488 - 037 K/uL    NEUTROPHILS 78 (H) 32 - 75 %    LYMPHOCYTES 11 (L) 12 - 49 %    MONOCYTES 6 5 - 13 %    EOSINOPHILS 5 0 - 7 %    BASOPHILS 0 0 - 1 %    ABS. NEUTROPHILS 7.8 1.8 - 8.0 K/UL    ABS. LYMPHOCYTES 1.1 0.8 - 3.5 K/UL    ABS. MONOCYTES 0.6 0.0 - 1.0 K/UL    ABS. EOSINOPHILS 0.5 (H) 0.0 - 0.4 K/UL    ABS.  BASOPHILS 0.0 0.0 - 0.1 K/UL   METABOLIC PANEL, BASIC   Result Value Ref Range    Sodium 138 136 - 145 mmol/L    Potassium 5.5 (H) 3.5 - 5.1 mmol/L    Chloride 111 (H) 97 - 108 mmol/L    CO2 20 (L) 21 - 32 mmol/L    Anion gap 7 5 - 15 mmol/L    Glucose 90 65 - 100 mg/dL    BUN 34 (H) 6 - 20 MG/DL    Creatinine 2.63 (H) 0.70 - 1.30 MG/DL    BUN/Creatinine ratio 13 12 - 20      GFR est AA 31 (L) >60 ml/min/1.73m2    GFR est non-AA 25 (L) >60 ml/min/1.73m2    Calcium 8.0 (L) 8.5 - 10.1 MG/DL   MAGNESIUM   Result Value Ref Range    Magnesium 1.8 1.6 - 2.4 mg/dL       Immunizations administered during this encounter:   Immunization History   Administered Date(s) Administered    Influenza Vaccine 10/17/2014    Influenza Vaccine (Quad) PF 10/23/2015, 2016    Pneumococcal Polysaccharide (PPSV-23) 2014       Screening for Metabolic Disorders for Patients on Antipsychotic Medications  (Data obtained from the EMR)    Estimated Body Mass Index  Estimated body mass index is 21.46 kg/(m^2) as calculated from the following:    Height as of this encounter: 5' 4\" (1.626 m). Weight as of this encounter: 56.7 kg (125 lb). Vital Signs/Blood Pressure  Visit Vitals    /63    Pulse 73    Temp 98.6 °F (37 °C)    Resp 16    Ht 5' 4\" (1.626 m)    Wt 56.7 kg (125 lb)  Comment: prior to tx    SpO2 100%    BMI 21.46 kg/m2       Blood Glucose/Hemoglobin A1c  Lab Results   Component Value Date/Time    Glucose 90 2017 04:12 PM    Glucose (POC) 103 2017 06:44 AM        No results found for: HBA1C, HGBE8, UQD2VIXL     Lipid Panel  No results found for: CHOL, CHOLX, CHLST, CHOLV, 829427, HDL, LDL, LDLC, DLDLP, TGLX, TRIGL, TRIGP, CHHD, CHHDX    Discharge Diagnosis: Suicidal Ideations     Discharge Plan: He will return home. NAME:Jesús Mcnamara  : 1960  MRN: [id number]    The patient Maggie Richards exhibits the ability to control behavior in a less restrictive environment. Patient's level of functioning is improving. No assaultive/destructive behavior has been observed for the past 24 hours. No suicidal/homicidal threat or behavior has been observed for the past 24 hours.   There is no evidence of serious medication side effects. Patient has not been in physical or protective restraints for at least the past 24 hours. If weapons involved, how are they secured? No weapons involved   Is patient aware of and in agreement with discharge plan? She is aware of discharge and is in agreement     Arrangements for medication:  Prescriptions given to patient. ( one week scripts given )       Referral for substance abuse treatment ? Yes, referred to the B - Wednesday 12/06/2017 at 1:30 with Dr. Justus Sloan    Referral for smoking cessation needed ? Yes, refused     Copy of discharge instructions to  provider?:  Yes, faxed to [phone]    Arrangements for transportation home:  Medicaid taxi     Keep all follow up appointments as scheduled, continue to take prescribed medications per physician instructions. Mental health crisis number:  450 or your local mental health crisis line number at [phone]              Discharge Medication List and Instructions:   Current Discharge Medication List      CONTINUE these medications which have CHANGED    Details   !! traZODone (DESYREL) 50 mg tablet Take 1 Tab by mouth three (3) times daily as needed (anxiety). Indications: insomnia associated with depression  Qty: 21 Tab, Refills: 0      !! traZODone (DESYREL) 100 mg tablet Take 1 Tab by mouth nightly. Indications: insomnia associated with depression  Qty: 7 Tab, Refills: 0      sodium bicarbonate 650 mg tablet Take 1 Tab by mouth two (2) times a day. Indications: Dyspepsia  Qty: 14 Tab, Refills: 0      pyridoxine, vitamin B6, (VITAMIN B-6) 100 mg tablet Take 1 Tab by mouth daily. Indications: DRUG-INDUCED PYRIDOXINE DEFICIENCY  Qty: 7 Tab, Refills: 0      folic acid (FOLVITE) 1 mg tablet Take 1 Tab by mouth daily. Indications: Folate Deficiency  Qty: 7 Tab, Refills: 0      cyanocobalamin (VITAMIN B-12) 100 mcg tablet Take 1 Tab by mouth daily.  Indications: PREVENTION OF VITAMIN B12 DEFICIENCY  Qty: 7 Tab, Refills: 0      carvedilol (COREG) 6.25 mg tablet Take 1 Tab by mouth two (2) times daily (with meals). Indications: hypertension  Qty: 14 Tab, Refills: 0      amLODIPine (NORVASC) 10 mg tablet Take 1 Tab by mouth daily. Indications: Chronic Stable Angina Pectoris  Qty: 7 Tab, Refills: 0       !! - Potential duplicate medications found. Please discuss with provider. STOP taking these medications       sertraline (ZOLOFT) 100 mg tablet Comments:   Reason for Stopping:         oxyCODONE-acetaminophen (PERCOCET 7.5) 7.5-325 mg per tablet Comments:   Reason for Stopping:         doxepin (SINEQUAN) 50 mg capsule Comments:   Reason for Stopping:               Unresulted Labs     None        To obtain results of studies pending at discharge, please contact [phone]     Follow-up Information     Follow up With Details Comments Contact Info    Phys MD Lazaro   Patient can only remember the practice name and not the physician      [address] [facility] On 12/6/2017 you he a 1:30 appointment with Dr. Sydney Spencer   Address: [facility] [address] Cast[address]eda,[address]ley, [address] [place] [address]  Hours: Open today · 8:30AM5PM  Phone: [phone]          Advanced Directive:   Does the patient have an appointed surrogate decision maker? No  Does the patient have a Medical Advance Directive? No  Does the patient have a Psychiatric Advance Directive? No  If the patient does not have a surrogate or Medical Advance Directive AND Psychiatric Advance Directive, the patient was offered information on these advance directives Patient declined to complete      Patient Instructions: Please continue all medications until otherwise directed by physician. Tobacco Cessation Discharge Plan:   Is the patient a smoker and needs referral for smoking cessation? Yes  Patient referred to the following for smoking cessation with an appointment? Refused     Patient was offered medication to assist with smoking cessation at discharge?  Refused  Was education for smoking cessation added to the discharge instructions? Yes    Alcohol/Substance Abuse Discharge Plan:   Does the patient have a history of substance/alcohol abuse and requires a referral for treatment? Yes  Patient referred to the following for substance/alcohol abuse treatment with an appointment? Yes  12/6/2017 at 1:30 with Dr. Tamra Sarkar   Patient was offered medication to assist with alcohol cessation at discharge? No  Was education for substance/alcohol abuse added to discharge instructions? No    Patient discharged to Home; discussed with patient/caregiver and provided to the patient/caregiver either in hard copy or electronically.

## 2017-12-01 NOTE — BH NOTES
GROUP THERAPY PROGRESS NOTE    Maryjo Rust did not participate in a 45 minute Acute Unit's Process Group, with a focus identifying feelings, planning for the rest of the day, and discussing discharge.

## 2017-12-01 NOTE — BH NOTES
GROUP THERAPY PROGRESS NOTE    Debjuan francisco Yonis is participating in Reflections. Group time: 10 minutes    Personal goal for participation: daily progress    Goal orientation: personal    Group therapy participation: minimal    Therapeutic interventions reviewed and discussed: \"Are you under stress? \" and Nutrition word search handouts. Impression of participation: Seems in fair spirits. \"I'm here voluntary. When can I leave? \"

## 2017-12-01 NOTE — BH NOTES
PRN Medication Documentation  [id number]  Specific patient behavior that led to need for PRN medication: L shoulder pain and sleeplessness  Staff interventions attempted prior to PRN being given: relaxation  PRN medication given: Ambien and motrin  Patient response/effectiveness of PRN medication: will continue to monitor

## 2017-12-01 NOTE — DISCHARGE SUMMARY
PSYCHIATRIC DISCHARGE SUMMARY         IDENTIFICATION:    Patient Name  Jayjay Gill   Date of Birth 1960   [facility] [id number]   Medical Record Number  [id number]      Age  65 y.o. PCP Lindsay Willis MD   Admit date:  11/28/2017    Discharge date: 12/1/2017   Room Number  730/02  @ [facility]   Date of Service  12/1/2017               TYPE OF DISCHARGE: REGULAR               CONDITION AT DISCHARGE: good       PROVISIONAL & DISCHARGE DIAGNOSES:    Problem List  Date Reviewed: 11/29/2017          Codes Class    Substance induced mood disorder ([facility] ICD-10-CM: F19.94  ICD-9-CM: 292.84         Depression ICD-10-CM: F32.9  ICD-9-CM: 311         Chest pain ICD-10-CM: R07.9  ICD-9-CM: 786.50         Sickle cell pain crisis ([facility] ICD-10-CM: D57.00  ICD-9-CM: 282.62         CKD (chronic kidney disease) stage 3, GFR 30-59 ml/min ICD-10-CM: N18.3  ICD-9-CM: 115. 3         Abscess of buttock, right ICD-10-CM: L02.31  ICD-9-CM: 682.5         Pain of right hip joint ICD-10-CM: M25.551  ICD-9-CM: 719.45         Bilateral pleural effusion ICD-10-CM: J90  ICD-9-CM: 511.9         Narcotic overdose ICD-10-CM: T40.601A  ICD-9-CM: 967.9, E980.2         Hyponatremia ICD-10-CM: E87.1  ICD-9-CM: 276.1         Acute hyperkalemia ICD-10-CM: E87.5  ICD-9-CM: 276.7         Sickle cell crisis (HCC) ICD-10-CM: D57.00  ICD-9-CM: 282.62         EDGAR (acute kidney injury) ([facility] ICD-10-CM: N17.9  ICD-9-CM: 584.9         Dehydration ICD-10-CM: E86.0  ICD-9-CM: 276.51         Drug abuse ICD-10-CM: F19.10  ICD-9-CM: 305.90         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 [facility]) ICD-10-CM: F19.94  ICD-9-CM: 292.84         MDD (major depressive disorder) ICD-10-CM: F32.9  ICD-9-CM: 296.20         * (Principal)Suicidal ideation ICD-10-CM: R45.851  ICD-9-CM: V62.84         Dislocation of hip joint prosthesis ([facility] ICD-10-CM: T84.029A, Z96.649  ICD-9-CM: 996.42, V43.64         Avascular necrosis of bone of right hip ([facility] ICD-10-CM: M87.051  ICD-9-CM: 733.42         Sickle cell anemia (HCC) (Chronic) ICD-10-CM: D57.1  ICD-9-CM: 282.60         ETOH abuse (Chronic) ICD-10-CM: F10.10  ICD-9-CM: 305.00         Chronic hepatitis C (HCC) (Chronic) ICD-10-CM: B18.2  ICD-9-CM: 070.54         Avascular necrosis of hip (HCC) ICD-10-CM: M87.059  ICD-9-CM: 733.42               Active Hospital Problems    Substance induced mood disorder (HCC)      Depression      *Suicidal ideation        DISCHARGE DIAGNOSIS:   Axis I:  SEE ABOVE  Axis II: SEE ABOVE  Axis III: SEE ABOVE  Axis IV:  lack of structure  Axis V:  60 on admission, 70 on discharge 70(baseline)       CC & HISTORY OF PRESENT ILLNESS:  65year old AA male admitted for depressed mood and SI. Pt responded well to Trazodone, with symptomatic remission. At discharge he denied SI/HI intent and plan. SOCIAL HISTORY:    Social History     Social History    Marital status: SINGLE     Spouse name: N/A    Number of children: N/A    Years of education: N/A     Occupational History    Not on file. Social History Main Topics    Smoking status: Current Every Day Smoker     Packs/day: 0.25     Years: 31.00    Smokeless tobacco: Never Used    Alcohol use 1.8 oz/week     3 Cans of beer per week      Comment: socially    Drug use: Yes     Special: Cocaine, Opiates, Prescription, Marijuana, Heroin    Sexual activity: Not Currently     Partners: Female     Other Topics Concern    Not on file     Social History Narrative    65year old AA male admitted voluntarily for non cardiac chest pain and later reported SI with plan to get into a fight so someone anna kill me. \" Pt has a hx of TBI with LOC,  sickle cell anemia, HEP c, chronic renal failure and dependence on THC, Cocaine, heroin, and ETOH. He reports no use of substances in one week. He has out patient psych follow up with Charli Tinajero M.D. At local B.        FAMILY HISTORY:   Family History   Problem Relation Age of Onset    No Known Problems Mother     Cancer Father     Cancer Sister              HOSPITALIZATION COURSE:    Kaur Stiles was admitted to the inpatient psychiatric unit [facility] for acute psychiatric stabilization in regards to symptomatology as described in the HPI above. The differential diagnosis at time of admission included: depression   While on the unit Kaur Stiles was involved in individual, group, occupational and milieu therapy. Psychiatric medications were adjusted during this hospitalization including trazodone . Kaur Stiles demonstrated a slow, but progressive improvement in overall condition. Much of patient's depression appeared to be related to situational stressors and psychological factors. Please see individual progress notes for more specific details regarding patient's hospitalization course. At time of dc, Kaur Stiles was without significant problems with depression psychosis  abraham. Overall presentation at time of discharge is most consistent with the diagnosis of adjustment disorder with depressed mood. Patient with request for discharge today. There are no grounds to seek a TDO. Patient has maximized benefit to be derived from acute inpatient psychiatric treatment. All members of the treatment team concur with each other in regards to plans for discharge today per patient's request.          LABS AND IMAGAING:    Labs Reviewed   CBC WITH AUTOMATED DIFF - Abnormal; Notable for the following:        Result Value    RBC 2.92 (*)     HGB 8.5 (*)     HCT 25.2 (*)     RDW 17.5 (*)     NEUTROPHILS 78 (*)     LYMPHOCYTES 11 (*)     ABS.  EOSINOPHILS 0.5 (*)     All other components within normal limits   METABOLIC PANEL, BASIC - Abnormal; Notable for the following:     Potassium 5.5 (*)     Chloride 111 (*)     CO2 20 (*)     BUN 34 (*)     Creatinine 2.63 (*)     GFR est AA 31 (*)     GFR est non-AA 25 (*)     Calcium 8.0 (*)     All other components within normal limits   TSH 3RD GENERATION   MAGNESIUM     Admission on 11/28/2017   Component Date Value Ref Range Status    TSH 11/29/2017 1.32  0.36 - 3.74 uIU/mL Final    WBC 11/29/2017 10.0  4.1 - 11.1 K/uL Final    RBC 11/29/2017 2.92* 4.10 - 5.70 M/uL Final    HGB 11/29/2017 8.5* 12.1 - 17.0 g/dL Final    HCT 11/29/2017 25.2* 36.6 - 50.3 % Final    MCV 11/29/2017 86.3  80.0 - 99.0 FL Final    MCH 11/29/2017 29.1  26.0 - 34.0 PG Final    MCHC 11/29/2017 33.7  30.0 - 36.5 g/dL Final    RDW 11/29/2017 17.5* 11.5 - 14.5 % Final    PLATELET 60/57/8081 828  150 - 400 K/uL Final    NEUTROPHILS 11/29/2017 78* 32 - 75 % Final    LYMPHOCYTES 11/29/2017 11* 12 - 49 % Final    MONOCYTES 11/29/2017 6  5 - 13 % Final    EOSINOPHILS 11/29/2017 5  0 - 7 % Final    BASOPHILS 11/29/2017 0  0 - 1 % Final    ABS. NEUTROPHILS 11/29/2017 7.8  1.8 - 8.0 K/UL Final    ABS. LYMPHOCYTES 11/29/2017 1.1  0.8 - 3.5 K/UL Final    ABS. MONOCYTES 11/29/2017 0.6  0.0 - 1.0 K/UL Final    ABS. EOSINOPHILS 11/29/2017 0.5* 0.0 - 0.4 K/UL Final    ABS.  BASOPHILS 11/29/2017 0.0  0.0 - 0.1 K/UL Final    Sodium 11/29/2017 138  136 - 145 mmol/L Final    Potassium 11/29/2017 5.5* 3.5 - 5.1 mmol/L Final    Chloride 11/29/2017 111* 97 - 108 mmol/L Final    CO2 11/29/2017 20* 21 - 32 mmol/L Final    Anion gap 11/29/2017 7  5 - 15 mmol/L Final    Glucose 11/29/2017 90  65 - 100 mg/dL Final    BUN 11/29/2017 34* 6 - 20 MG/DL Final    Creatinine 11/29/2017 2.63* 0.70 - 1.30 MG/DL Final    BUN/Creatinine ratio 11/29/2017 13  12 - 20   Final    GFR est AA 11/29/2017 31* >60 ml/min/1.73m2 Final    GFR est non-AA 11/29/2017 25* >60 ml/min/1.73m2 Final    Calcium 11/29/2017 8.0* 8.5 - 10.1 MG/DL Final    Magnesium 11/29/2017 1.8  1.6 - 2.4 mg/dL Final   Admission on 11/26/2017, Discharged on 11/28/2017   Component Date Value Ref Range Status    Ventricular Rate 11/26/2017 79  BPM Preliminary    Atrial Rate 11/26/2017 79  BPM Preliminary    P-R Interval 11/26/2017 116  ms Preliminary    QRS Duration 11/26/2017 78  ms Preliminary    Q-T Interval 11/26/2017 370  ms Preliminary    QTC Calculation (Bezet) 11/26/2017 424  ms Preliminary    Calculated P Axis 11/26/2017 53  degrees Preliminary    Calculated R Axis 11/26/2017 71  degrees Preliminary    Calculated T Axis 11/26/2017 18  degrees Preliminary    Diagnosis 11/26/2017    Preliminary                    Value:Normal sinus rhythm with sinus arrhythmia  Voltage criteria for left ventricular hypertrophy  Abnormal ECG  When compared with ECG of 10-APR-2017 17:12,  No significant change was found      WBC 11/26/2017 13.2  4.6 - 13.2 K/uL Final    RBC 11/26/2017 2.38* 4.70 - 5.50 M/uL Final    HGB 11/26/2017 6.9* 13.0 - 16.0 g/dL Final    HCT 11/26/2017 19.8* 36.0 - 48.0 % Final    MCV 11/26/2017 83.2  74.0 - 97.0 FL Final    MCH 11/26/2017 29.0  24.0 - 34.0 PG Final    MCHC 11/26/2017 34.8  31.0 - 37.0 g/dL Final    RDW 11/26/2017 17.6* 11.6 - 14.5 % Final    PLATELET 20/44/4172 576  135 - 420 K/uL Final    MPV 11/26/2017 10.2  9.2 - 11.8 FL Final    NEUTROPHILS 11/26/2017 77* 40 - 73 % Final    LYMPHOCYTES 11/26/2017 14* 21 - 52 % Final    MONOCYTES 11/26/2017 7  3 - 10 % Final    EOSINOPHILS 11/26/2017 2  0 - 5 % Final    BASOPHILS 11/26/2017 0  0 - 2 % Final    ABS. NEUTROPHILS 11/26/2017 10.2* 1.8 - 8.0 K/UL Final    ABS. LYMPHOCYTES 11/26/2017 1.9  0.9 - 3.6 K/UL Final    ABS. MONOCYTES 11/26/2017 0.9  0.05 - 1.2 K/UL Final    ABS. EOSINOPHILS 11/26/2017 0.2  0.0 - 0.4 K/UL Final    ABS.  BASOPHILS 11/26/2017 0.0  0.0 - 0.06 K/UL Final    DF 11/26/2017 AUTOMATED    Final    Lipase 11/26/2017 115  73 - 393 U/L Final    Magnesium 11/26/2017 2.2  1.6 - 2.6 mg/dL Final    Sodium 11/26/2017 138  136 - 145 mmol/L Final    Potassium 11/26/2017 5.5  3.5 - 5.5 mmol/L Final    Chloride 11/26/2017 110* 100 - 108 mmol/L Final    CO2 11/26/2017 16* 21 - 32 mmol/L Final    Anion gap 11/26/2017 12  3.0 - 18 mmol/L Final    Glucose 11/26/2017 94  74 - 99 mg/dL Final    BUN 11/26/2017 52* 7.0 - 18 MG/DL Final    Creatinine 11/26/2017 4.11* 0.6 - 1.3 MG/DL Final    BUN/Creatinine ratio 11/26/2017 13  12 - 20   Final    GFR est AA 11/26/2017 18* >60 ml/min/1.73m2 Final    GFR est non-AA 11/26/2017 15* >60 ml/min/1.73m2 Final    Calcium 11/26/2017 8.6  8.5 - 10.1 MG/DL Final    Bilirubin, total 11/26/2017 1.2* 0.2 - 1.0 MG/DL Final    ALT (SGPT) 11/26/2017 20  16 - 61 U/L Final    AST (SGOT) 11/26/2017 27  15 - 37 U/L Final    Alk.  phosphatase 11/26/2017 78  45 - 117 U/L Final    Protein, total 11/26/2017 7.8  6.4 - 8.2 g/dL Final    Albumin 11/26/2017 3.4  3.4 - 5.0 g/dL Final    Globulin 11/26/2017 4.4* 2.0 - 4.0 g/dL Final    A-G Ratio 11/26/2017 0.8  0.8 - 1.7   Final    Color 11/26/2017 YELLOW    Final    Appearance 11/26/2017 CLEAR    Final    Specific gravity 11/26/2017 1.012  1.005 - 1.030   Final    pH (UA) 11/26/2017 6.0  5.0 - 8.0   Final    Protein 11/26/2017 300* NEG mg/dL Final    Glucose 11/26/2017 NEGATIVE   NEG mg/dL Final    Ketone 11/26/2017 NEGATIVE   NEG mg/dL Final    Bilirubin 11/26/2017 NEGATIVE   NEG   Final    Blood 11/26/2017 TRACE* NEG   Final    Urobilinogen 11/26/2017 0.2  0.2 - 1.0 EU/dL Final    Nitrites 11/26/2017 NEGATIVE   NEG   Final    Leukocyte Esterase 11/26/2017 NEGATIVE   NEG   Final    Reticulocyte count 11/26/2017 9.0* 0.5 - 2.3 % Final    CK 11/26/2017 69  39 - 308 U/L Final    CK - MB 11/26/2017 1.5  <3.6 ng/ml Final    CK-MB Index 11/26/2017 2.2  0.0 - 4.0 % Final    Troponin-I, Qt. 11/26/2017 <0.02  0.00 - 0.06 NG/ML Final    WBC 11/26/2017 2 to 5  0 - 5 /hpf Final    RBC 11/26/2017 1 to 3  0 - 5 /hpf Final    Epithelial cells 11/26/2017 NEGATIVE   0 - 5 /lpf Final    Bacteria 11/26/2017 FEW* NEG /hpf Final    Mucus 11/26/2017 NEGATIVE   NEG /lpf Final    CK 11/26/2017 55  39 - 308 U/L Final    CK - MB 11/26/2017 1. 3  <3.6 ng/ml Final    CK-MB Index 11/26/2017 2.4  0.0 - 4.0 % Final    Troponin-I, Qt. 11/26/2017 <0.02  0.00 - 0.06 NG/ML Final    Crossmatch Expiration 11/26/2017 11/29/2017   Final    ABO/Rh(D) 11/26/2017 A POSITIVE   Final    Antibody screen 11/26/2017 NEG   Final    Physician instructions 11/26/2017 SICKLEDEX NEGATIVE   Final    CALLED TO: 11/26/2017 ADWOA AT 8378 ON 11/26/17 LAD   Final    Unit number 11/26/2017 [id number]   Final    Blood component type 11/26/2017 RC LR AS1   Final    Unit division 11/26/2017 00   Final    Status of unit 11/26/2017 TRANSFUSED   Final    ANTIGEN/ANTIBODY INFO 11/26/2017    Final                    Value:C NEGATIVE,  AUREA NEGATIVE,  E NEGATIVE,  Jk(A) NEGATIVE,      Crossmatch result 11/26/2017 Compatible   Final    Unit number 11/26/2017 [id number]   Final    Blood component type 11/26/2017 RC LR AS1   Final    Unit division 11/26/2017 00   Final    Status of unit 11/26/2017 TRANSFUSED   Final    ANTIGEN/ANTIBODY INFO 11/26/2017    Final                    Value:C NEGATIVE,  AUREA NEGATIVE,  E NEGATIVE,  Jk(A) NEGATIVE,      Crossmatch result 11/26/2017 Compatible   Final    LD 11/26/2017 272* 81 - 234 U/L Final    CK 11/26/2017 45  39 - 308 U/L Final    CK - MB 11/26/2017 <1.0  <3.6 ng/ml Final    CK-MB Index 11/26/2017 CALCULATION NOT PERFORMED WHEN RESULT IS BELOW LINEAR LIMIT  0.0 - 4.0 % Final    Troponin-I, Qt. 11/26/2017 <0.02  0.00 - 0.06 NG/ML Final    LD 11/27/2017 266* 81 - 234 U/L Final    LD 11/28/2017 225  81 - 234 U/L Final    WBC 11/28/2017 10.5  4.6 - 13.2 K/uL Final    RBC 11/28/2017 2.66* 4.70 - 5.50 M/uL Final    HGB 11/28/2017 7.8* 13.0 - 16.0 g/dL Final    HCT 11/28/2017 22.6* 36.0 - 48.0 % Final    MCV 11/28/2017 85.0  74.0 - 97.0 FL Final    MCH 11/28/2017 29.3  24.0 - 34.0 PG Final    MCHC 11/28/2017 34.5  31.0 - 37.0 g/dL Final    RDW 11/28/2017 17.4* 11.6 - 14.5 % Final    PLATELET 07/34/8659 765 433 - 963 K/uL Final    MPV 11/28/2017 10.6  9.2 - 11.8 FL Final    NEUTROPHILS 11/28/2017 69  40 - 73 % Final    LYMPHOCYTES 11/28/2017 18* 21 - 52 % Final    MONOCYTES 11/28/2017 7  3 - 10 % Final    EOSINOPHILS 11/28/2017 5  0 - 5 % Final    BASOPHILS 11/28/2017 1  0 - 2 % Final    ABS. NEUTROPHILS 11/28/2017 7.3  1.8 - 8.0 K/UL Final    ABS. LYMPHOCYTES 11/28/2017 1.9  0.9 - 3.6 K/UL Final    ABS. MONOCYTES 11/28/2017 0.7  0.05 - 1.2 K/UL Final    ABS. EOSINOPHILS 11/28/2017 0.6* 0.0 - 0.4 K/UL Final    ABS.  BASOPHILS 11/28/2017 0.1* 0.0 - 0.06 K/UL Final    DF 11/28/2017 AUTOMATED    Final    Sodium 11/28/2017 139  136 - 145 mmol/L Final    Potassium 11/28/2017 5.5  3.5 - 5.5 mmol/L Final    Chloride 11/28/2017 114* 100 - 108 mmol/L Final    CO2 11/28/2017 20* 21 - 32 mmol/L Final    Anion gap 11/28/2017 5  3.0 - 18 mmol/L Final    Glucose 11/28/2017 89  74 - 99 mg/dL Final    BUN 11/28/2017 34* 7.0 - 18 MG/DL Final    Creatinine 11/28/2017 2.66* 0.6 - 1.3 MG/DL Final    BUN/Creatinine ratio 11/28/2017 13  12 - 20   Final    GFR est AA 11/28/2017 30* >60 ml/min/1.73m2 Final    GFR est non-AA 11/28/2017 25* >60 ml/min/1.73m2 Final    Calcium 11/28/2017 7.8* 8.5 - 10.1 MG/DL Final    Magnesium 11/28/2017 1.6  1.6 - 2.6 mg/dL Final    Ventricular Rate 11/28/2017 73  BPM Preliminary    Atrial Rate 11/28/2017 73  BPM Preliminary    P-R Interval 11/28/2017 122  ms Preliminary    QRS Duration 11/28/2017 82  ms Preliminary    Q-T Interval 11/28/2017 398  ms Preliminary    QTC Calculation (Bezet) 11/28/2017 438  ms Preliminary    Calculated P Axis 11/28/2017 40  degrees Preliminary    Calculated R Axis 11/28/2017 33  degrees Preliminary    Calculated T Axis 11/28/2017 12  degrees Preliminary    Diagnosis 11/28/2017    Preliminary                    Value:Normal sinus rhythm with sinus arrhythmia  Normal ECG  When compared with ECG of 26-NOV-2017 00:37,  No significant change was found       Xr Chest Pa Lat    Result Date: 11/26/2017  CLINICAL HISTORY:  Chest pain. COMPARISON EXAMINATIONS:  March 19, 2017. ---  CHEST PA AND LATERAL  --- The cardiomediastinal silhouette is normal.  The lungs are clear. There are no significant pleural effusions. The bony structures and soft tissues are unremarkable. --------------    IMPRESSION: -------------- No active cardiopulmonary disease. Xr Shoulder Lt Ap/lat Min 2 V    Result Date: 11/29/2017  EXAM:  XR SHOULDER LT AP/LAT MIN 2 V INDICATION:   left shoulder pain. COMPARISON: None. FINDINGS: 2 views of the left shoulder demonstrate no acute fracture or dislocation. Mild degenerative changes are seen in the left acromioclavicular joint. No abnormality seen in the visualized soft tissues. IMPRESSION:  Mild degenerative changes in the acromioclavicular joint. .                   DISPOSITION:    Home. Patient to f/u with o/p drug/etoh rehabilitation, psychiatric, and psychotherapy appointments. Patient is to f/u with internist as directed. FOLLOW-UP CARE:    Activity as tolerated  Regular Diet  Wound Care: none needed. Follow-up Information     Follow up With Details Comments Contact Info    Phys Other, MD   Patient can only remember the practice name and not the physician                   PROGNOSIS:  Greatly dependent upon patient's ability to remain sober and to f/u with o/p drug/etoh rehabilitation and psychiatric/psychotherapy appointments as well as to comply with psychiatric medications as prescribed. Patient denies suicidal or homicidal ideations. Adin Fernandez fully contracts for safety. Patient reports many positive predictive factors in terms of not attempting suicide or homicide. Patient appears to be at low risk of suicide or homicide. Patient and family are aware and in agreement with discharge and discharge plan. DISCHARGE MEDICATIONS: (no changes made).     Informed consent given for the use of following psychotropic medications:  Current Discharge Medication List      CONTINUE these medications which have CHANGED    Details   !! traZODone (DESYREL) 50 mg tablet Take 1 Tab by mouth three (3) times daily as needed (anxiety). Indications: insomnia associated with depression  Qty: 21 Tab, Refills: 0      !! traZODone (DESYREL) 100 mg tablet Take 1 Tab by mouth nightly. Indications: insomnia associated with depression  Qty: 7 Tab, Refills: 0      sodium bicarbonate 650 mg tablet Take 1 Tab by mouth two (2) times a day. Indications: Dyspepsia  Qty: 14 Tab, Refills: 0      pyridoxine, vitamin B6, (VITAMIN B-6) 100 mg tablet Take 1 Tab by mouth daily. Indications: DRUG-INDUCED PYRIDOXINE DEFICIENCY  Qty: 7 Tab, Refills: 0      folic acid (FOLVITE) 1 mg tablet Take 1 Tab by mouth daily. Indications: Folate Deficiency  Qty: 7 Tab, Refills: 0      cyanocobalamin (VITAMIN B-12) 100 mcg tablet Take 1 Tab by mouth daily. Indications: PREVENTION OF VITAMIN B12 DEFICIENCY  Qty: 7 Tab, Refills: 0      carvedilol (COREG) 6.25 mg tablet Take 1 Tab by mouth two (2) times daily (with meals). Indications: hypertension  Qty: 14 Tab, Refills: 0      amLODIPine (NORVASC) 10 mg tablet Take 1 Tab by mouth daily. Indications: Chronic Stable Angina Pectoris  Qty: 7 Tab, Refills: 0       !! - Potential duplicate medications found. Please discuss with provider. STOP taking these medications       sertraline (ZOLOFT) 100 mg tablet Comments:   Reason for Stopping:         oxyCODONE-acetaminophen (PERCOCET 7.5) 7.5-325 mg per tablet Comments:   Reason for Stopping:         doxepin (SINEQUAN) 50 mg capsule Comments:   Reason for Stopping:                      A coordinated, multidisplinary treatment team round was conducted with Caleb Mcnamara---this is done daily here at [facility] . This team consists of the nurse, psychiatric unit pharmcist,  and writer. I have spent greater than 35 minutes on discharge work.     Signed:  Jon Chavis Douglas Vora MD  12/1/2017

## 2017-12-03 LAB
ATRIAL RATE: 73 BPM
CALCULATED P AXIS, ECG09: 40 DEGREES
CALCULATED R AXIS, ECG10: 33 DEGREES
CALCULATED T AXIS, ECG11: 12 DEGREES
DIAGNOSIS, 93000: NORMAL
P-R INTERVAL, ECG05: 122 MS
Q-T INTERVAL, ECG07: 398 MS
QRS DURATION, ECG06: 82 MS
QTC CALCULATION (BEZET), ECG08: 438 MS
VENTRICULAR RATE, ECG03: 73 BPM

## 2018-02-06 ENCOUNTER — HOSPITAL ENCOUNTER (INPATIENT)
Age: 58
LOS: 5 days | Discharge: HOME | DRG: 383 | End: 2018-02-11
Attending: INTERNAL MEDICINE | Admitting: INTERNAL MEDICINE
Payer: MEDICAID

## 2018-02-06 ENCOUNTER — APPOINTMENT (OUTPATIENT)
Dept: GENERAL RADIOLOGY | Age: 58
DRG: 383 | End: 2018-02-06
Attending: INTERNAL MEDICINE
Payer: MEDICAID

## 2018-02-06 ENCOUNTER — APPOINTMENT (OUTPATIENT)
Dept: CT IMAGING | Age: 58
DRG: 383 | End: 2018-02-06
Attending: INTERNAL MEDICINE
Payer: MEDICAID

## 2018-02-06 DIAGNOSIS — L03.311 CELLULITIS OF ABDOMINAL WALL: Primary | ICD-10-CM

## 2018-02-06 DIAGNOSIS — D64.9 ANEMIA, UNSPECIFIED TYPE: ICD-10-CM

## 2018-02-06 DIAGNOSIS — L02.91 ABSCESS: ICD-10-CM

## 2018-02-06 DIAGNOSIS — N17.9 ACUTE RENAL FAILURE, UNSPECIFIED ACUTE RENAL FAILURE TYPE (HCC): ICD-10-CM

## 2018-02-06 DIAGNOSIS — D72.829 LEUKOCYTOSIS, UNSPECIFIED TYPE: ICD-10-CM

## 2018-02-06 DIAGNOSIS — R53.1 WEAKNESS: ICD-10-CM

## 2018-02-06 PROBLEM — L02.219 CELLULITIS AND ABSCESS OF TRUNK: Status: ACTIVE | Noted: 2018-02-06

## 2018-02-06 PROBLEM — L03.319 CELLULITIS AND ABSCESS OF TRUNK: Status: ACTIVE | Noted: 2018-02-06

## 2018-02-06 PROBLEM — L03.90 CELLULITIS: Status: ACTIVE | Noted: 2018-02-06

## 2018-02-06 LAB
ALBUMIN SERPL-MCNC: 3.2 G/DL (ref 3.4–5)
ALBUMIN/GLOB SERPL: 0.8 {RATIO} (ref 0.8–1.7)
ALP SERPL-CCNC: 71 U/L (ref 45–117)
ALT SERPL-CCNC: 25 U/L (ref 16–61)
AMPHET UR QL SCN: NEGATIVE
ANION GAP SERPL CALC-SCNC: 13 MMOL/L (ref 3–18)
APPEARANCE UR: CLEAR
AST SERPL-CCNC: 25 U/L (ref 15–37)
ATRIAL RATE: 88 BPM
BACTERIA URNS QL MICRO: NEGATIVE /HPF
BARBITURATES UR QL SCN: NEGATIVE
BASOPHILS # BLD: 0 K/UL (ref 0–0.06)
BASOPHILS NFR BLD: 0 % (ref 0–2)
BENZODIAZ UR QL: NEGATIVE
BILIRUB SERPL-MCNC: 0.8 MG/DL (ref 0.2–1)
BILIRUB UR QL: NEGATIVE
BUN SERPL-MCNC: 47 MG/DL (ref 7–18)
BUN/CREAT SERPL: 11 (ref 12–20)
CALCIUM SERPL-MCNC: 8 MG/DL (ref 8.5–10.1)
CALCULATED P AXIS, ECG09: 73 DEGREES
CALCULATED R AXIS, ECG10: 28 DEGREES
CALCULATED T AXIS, ECG11: 12 DEGREES
CANNABINOIDS UR QL SCN: NEGATIVE
CHLORIDE SERPL-SCNC: 110 MMOL/L (ref 100–108)
CK MB CFR SERPL CALC: 3.2 % (ref 0–4)
CK MB SERPL-MCNC: 1.3 NG/ML (ref 5–25)
CK SERPL-CCNC: 41 U/L (ref 39–308)
CO2 SERPL-SCNC: 18 MMOL/L (ref 21–32)
COCAINE UR QL SCN: POSITIVE
COLOR UR: YELLOW
CREAT SERPL-MCNC: 4.34 MG/DL (ref 0.6–1.3)
DIAGNOSIS, 93000: NORMAL
DIFFERENTIAL METHOD BLD: ABNORMAL
EOSINOPHIL # BLD: 0.2 K/UL (ref 0–0.4)
EOSINOPHIL NFR BLD: 1 % (ref 0–5)
EPITH CASTS URNS QL MICRO: NEGATIVE /LPF (ref 0–5)
ERYTHROCYTE [DISTWIDTH] IN BLOOD BY AUTOMATED COUNT: 16.1 % (ref 11.6–14.5)
GLOBULIN SER CALC-MCNC: 3.9 G/DL (ref 2–4)
GLUCOSE SERPL-MCNC: 108 MG/DL (ref 74–99)
GLUCOSE UR STRIP.AUTO-MCNC: NEGATIVE MG/DL
HCT VFR BLD AUTO: 18.1 % (ref 36–48)
HDSCOM,HDSCOM: ABNORMAL
HGB BLD-MCNC: 6.3 G/DL (ref 13–16)
HGB UR QL STRIP: NEGATIVE
KETONES UR QL STRIP.AUTO: NEGATIVE MG/DL
LACTATE SERPL-SCNC: 1.8 MMOL/L (ref 0.4–2)
LEUKOCYTE ESTERASE UR QL STRIP.AUTO: NEGATIVE
LIPASE SERPL-CCNC: 129 U/L (ref 73–393)
LYMPHOCYTES # BLD: 1.5 K/UL (ref 0.9–3.6)
LYMPHOCYTES NFR BLD: 8 % (ref 21–52)
MCH RBC QN AUTO: 29.6 PG (ref 24–34)
MCHC RBC AUTO-ENTMCNC: 34.8 G/DL (ref 31–37)
MCV RBC AUTO: 85 FL (ref 74–97)
METHADONE UR QL: NEGATIVE
MONOCYTES # BLD: 0.9 K/UL (ref 0.05–1.2)
MONOCYTES NFR BLD: 5 % (ref 3–10)
NEUTS SEG # BLD: 15.6 K/UL (ref 1.8–8)
NEUTS SEG NFR BLD: 86 % (ref 40–73)
NITRITE UR QL STRIP.AUTO: NEGATIVE
OPIATES UR QL: POSITIVE
P-R INTERVAL, ECG05: 104 MS
PCP UR QL: NEGATIVE
PH UR STRIP: 6.5 [PH] (ref 5–8)
PLATELET # BLD AUTO: 202 K/UL (ref 135–420)
PMV BLD AUTO: 10.4 FL (ref 9.2–11.8)
POTASSIUM SERPL-SCNC: 5.2 MMOL/L (ref 3.5–5.5)
PROT SERPL-MCNC: 7.1 G/DL (ref 6.4–8.2)
PROT UR STRIP-MCNC: 300 MG/DL
Q-T INTERVAL, ECG07: 362 MS
QRS DURATION, ECG06: 78 MS
QTC CALCULATION (BEZET), ECG08: 438 MS
RBC # BLD AUTO: 2.13 M/UL (ref 4.7–5.5)
RBC #/AREA URNS HPF: NEGATIVE /HPF (ref 0–5)
RETICS/RBC NFR AUTO: 3.7 % (ref 0.5–2.3)
SODIUM SERPL-SCNC: 141 MMOL/L (ref 136–145)
SP GR UR REFRACTOMETRY: 1.01 (ref 1–1.03)
TROPONIN I SERPL-MCNC: 0.02 NG/ML (ref 0–0.06)
UROBILINOGEN UR QL STRIP.AUTO: 0.2 EU/DL (ref 0.2–1)
VENTRICULAR RATE, ECG03: 88 BPM
WBC # BLD AUTO: 18.3 K/UL (ref 4.6–13.2)
WBC URNS QL MICRO: NEGATIVE /HPF (ref 0–5)

## 2018-02-06 PROCEDURE — 81001 URINALYSIS AUTO W/SCOPE: CPT | Performed by: INTERNAL MEDICINE

## 2018-02-06 PROCEDURE — 75810000289 HC I&D ABSCESS SIMP/COMP/MULT

## 2018-02-06 PROCEDURE — 83605 ASSAY OF LACTIC ACID: CPT | Performed by: INTERNAL MEDICINE

## 2018-02-06 PROCEDURE — 96365 THER/PROPH/DIAG IV INF INIT: CPT

## 2018-02-06 PROCEDURE — 83690 ASSAY OF LIPASE: CPT | Performed by: INTERNAL MEDICINE

## 2018-02-06 PROCEDURE — 74011636320 HC RX REV CODE- 636/320: Performed by: INTERNAL MEDICINE

## 2018-02-06 PROCEDURE — 30233N1 TRANSFUSION OF NONAUTOLOGOUS RED BLOOD CELLS INTO PERIPHERAL VEIN, PERCUTANEOUS APPROACH: ICD-10-PCS | Performed by: INTERNAL MEDICINE

## 2018-02-06 PROCEDURE — 74177 CT ABD & PELVIS W/CONTRAST: CPT

## 2018-02-06 PROCEDURE — 65270000029 HC RM PRIVATE

## 2018-02-06 PROCEDURE — 74011250636 HC RX REV CODE- 250/636: Performed by: INTERNAL MEDICINE

## 2018-02-06 PROCEDURE — 74011250637 HC RX REV CODE- 250/637: Performed by: INTERNAL MEDICINE

## 2018-02-06 PROCEDURE — 99284 EMERGENCY DEPT VISIT MOD MDM: CPT

## 2018-02-06 PROCEDURE — 0J983ZZ DRAINAGE OF ABDOMEN SUBCUTANEOUS TISSUE AND FASCIA, PERCUTANEOUS APPROACH: ICD-10-PCS | Performed by: INTERNAL MEDICINE

## 2018-02-06 PROCEDURE — 96366 THER/PROPH/DIAG IV INF ADDON: CPT

## 2018-02-06 PROCEDURE — 87040 BLOOD CULTURE FOR BACTERIA: CPT | Performed by: INTERNAL MEDICINE

## 2018-02-06 PROCEDURE — 85025 COMPLETE CBC W/AUTO DIFF WBC: CPT | Performed by: INTERNAL MEDICINE

## 2018-02-06 PROCEDURE — 71045 X-RAY EXAM CHEST 1 VIEW: CPT

## 2018-02-06 PROCEDURE — 93005 ELECTROCARDIOGRAM TRACING: CPT

## 2018-02-06 PROCEDURE — 85045 AUTOMATED RETICULOCYTE COUNT: CPT | Performed by: INTERNAL MEDICINE

## 2018-02-06 PROCEDURE — 80307 DRUG TEST PRSMV CHEM ANLYZR: CPT | Performed by: INTERNAL MEDICINE

## 2018-02-06 PROCEDURE — 82550 ASSAY OF CK (CPK): CPT | Performed by: INTERNAL MEDICINE

## 2018-02-06 PROCEDURE — 80053 COMPREHEN METABOLIC PANEL: CPT | Performed by: INTERNAL MEDICINE

## 2018-02-06 PROCEDURE — 99218 HC RM OBSERVATION: CPT

## 2018-02-06 RX ORDER — FENTANYL CITRATE 50 UG/ML
50 INJECTION, SOLUTION INTRAMUSCULAR; INTRAVENOUS
Status: COMPLETED | OUTPATIENT
Start: 2018-02-06 | End: 2018-02-06

## 2018-02-06 RX ORDER — CARVEDILOL 12.5 MG/1
12.5 TABLET ORAL
Status: COMPLETED | OUTPATIENT
Start: 2018-02-06 | End: 2018-02-06

## 2018-02-06 RX ORDER — AMLODIPINE BESYLATE 5 MG/1
5 TABLET ORAL
Status: COMPLETED | OUTPATIENT
Start: 2018-02-06 | End: 2018-02-06

## 2018-02-06 RX ORDER — FOLIC ACID 1 MG/1
1 TABLET ORAL DAILY
Status: DISCONTINUED | OUTPATIENT
Start: 2018-02-07 | End: 2018-02-11 | Stop reason: HOSPADM

## 2018-02-06 RX ORDER — SODIUM CHLORIDE 0.9 % (FLUSH) 0.9 %
5-10 SYRINGE (ML) INJECTION AS NEEDED
Status: DISCONTINUED | OUTPATIENT
Start: 2018-02-06 | End: 2018-02-11 | Stop reason: HOSPADM

## 2018-02-06 RX ORDER — VANCOMYCIN/0.9 % SOD CHLORIDE 1.5G/250ML
1500 PLASTIC BAG, INJECTION (ML) INTRAVENOUS
Status: DISCONTINUED | OUTPATIENT
Start: 2018-02-06 | End: 2018-02-06 | Stop reason: DRUGHIGH

## 2018-02-06 RX ORDER — PYRIDOXINE HCL (VITAMIN B6) 100 MG
100 TABLET ORAL DAILY
Status: DISCONTINUED | OUTPATIENT
Start: 2018-02-07 | End: 2018-02-11 | Stop reason: HOSPADM

## 2018-02-06 RX ORDER — CARVEDILOL 3.12 MG/1
6.25 TABLET ORAL 2 TIMES DAILY WITH MEALS
Status: DISCONTINUED | OUTPATIENT
Start: 2018-02-07 | End: 2018-02-07

## 2018-02-06 RX ORDER — SODIUM CHLORIDE 0.9 % (FLUSH) 0.9 %
5-10 SYRINGE (ML) INJECTION EVERY 8 HOURS
Status: DISCONTINUED | OUTPATIENT
Start: 2018-02-06 | End: 2018-02-11 | Stop reason: HOSPADM

## 2018-02-06 RX ORDER — TRAZODONE HYDROCHLORIDE 50 MG/1
50 TABLET ORAL
Status: DISCONTINUED | OUTPATIENT
Start: 2018-02-06 | End: 2018-02-09

## 2018-02-06 RX ORDER — UREA 10 %
100 LOTION (ML) TOPICAL DAILY
Status: DISCONTINUED | OUTPATIENT
Start: 2018-02-07 | End: 2018-02-11 | Stop reason: HOSPADM

## 2018-02-06 RX ORDER — MORPHINE SULFATE 10 MG/ML
2 INJECTION, SOLUTION INTRAMUSCULAR; INTRAVENOUS
Status: COMPLETED | OUTPATIENT
Start: 2018-02-06 | End: 2018-02-06

## 2018-02-06 RX ORDER — SODIUM CHLORIDE 9 MG/ML
100 INJECTION, SOLUTION INTRAVENOUS CONTINUOUS
Status: DISPENSED | OUTPATIENT
Start: 2018-02-06 | End: 2018-02-07

## 2018-02-06 RX ORDER — SODIUM BICARBONATE 650 MG/1
650 TABLET ORAL 2 TIMES DAILY
Status: DISCONTINUED | OUTPATIENT
Start: 2018-02-07 | End: 2018-02-11 | Stop reason: HOSPADM

## 2018-02-06 RX ORDER — ONDANSETRON 2 MG/ML
4 INJECTION INTRAMUSCULAR; INTRAVENOUS
Status: COMPLETED | OUTPATIENT
Start: 2018-02-06 | End: 2018-02-06

## 2018-02-06 RX ORDER — HEPARIN SODIUM 5000 [USP'U]/ML
5000 INJECTION, SOLUTION INTRAVENOUS; SUBCUTANEOUS EVERY 8 HOURS
Status: DISCONTINUED | OUTPATIENT
Start: 2018-02-06 | End: 2018-02-11 | Stop reason: HOSPADM

## 2018-02-06 RX ORDER — AMLODIPINE BESYLATE 5 MG/1
10 TABLET ORAL DAILY
Status: DISCONTINUED | OUTPATIENT
Start: 2018-02-07 | End: 2018-02-11 | Stop reason: HOSPADM

## 2018-02-06 RX ORDER — HYDROMORPHONE HYDROCHLORIDE 1 MG/ML
1 INJECTION, SOLUTION INTRAMUSCULAR; INTRAVENOUS; SUBCUTANEOUS
Status: DISCONTINUED | OUTPATIENT
Start: 2018-02-06 | End: 2018-02-07

## 2018-02-06 RX ORDER — ACETAMINOPHEN 325 MG/1
650 TABLET ORAL
Status: DISCONTINUED | OUTPATIENT
Start: 2018-02-06 | End: 2018-02-11 | Stop reason: HOSPADM

## 2018-02-06 RX ORDER — TRAZODONE HYDROCHLORIDE 100 MG/1
100 TABLET ORAL
Status: DISCONTINUED | OUTPATIENT
Start: 2018-02-06 | End: 2018-02-11 | Stop reason: HOSPADM

## 2018-02-06 RX ORDER — SODIUM CHLORIDE 9 MG/ML
250 INJECTION, SOLUTION INTRAVENOUS AS NEEDED
Status: DISCONTINUED | OUTPATIENT
Start: 2018-02-06 | End: 2018-02-11 | Stop reason: HOSPADM

## 2018-02-06 RX ADMIN — IOPAMIDOL 100 ML: 612 INJECTION, SOLUTION INTRAVENOUS at 17:25

## 2018-02-06 RX ADMIN — AMLODIPINE BESYLATE 5 MG: 5 TABLET ORAL at 20:18

## 2018-02-06 RX ADMIN — ONDANSETRON 4 MG: 2 INJECTION INTRAMUSCULAR; INTRAVENOUS at 16:37

## 2018-02-06 RX ADMIN — FENTANYL CITRATE 50 MCG: 50 INJECTION, SOLUTION INTRAMUSCULAR; INTRAVENOUS at 19:48

## 2018-02-06 RX ADMIN — CARVEDILOL 12.5 MG: 12.5 TABLET, FILM COATED ORAL at 20:32

## 2018-02-06 RX ADMIN — SODIUM CHLORIDE 1000 MG: 900 INJECTION, SOLUTION INTRAVENOUS at 16:36

## 2018-02-06 RX ADMIN — SODIUM CHLORIDE 1000 ML: 900 INJECTION, SOLUTION INTRAVENOUS at 16:36

## 2018-02-06 RX ADMIN — MORPHINE SULFATE 2 MG: 10 INJECTION, SOLUTION INTRAMUSCULAR; INTRAVENOUS at 16:37

## 2018-02-06 RX ADMIN — SODIUM CHLORIDE 1000 ML: 900 INJECTION, SOLUTION INTRAVENOUS at 18:41

## 2018-02-06 NOTE — PROGRESS NOTES
Pharmacy Dosing Services: Vancomycin    Consult for Vancomycin Dosing by Pharmacy by Dr. Escobar Memory provided for this 65y.o. year old male , for indication of SSTI (w/ associated hx of IVDA). Day of Therapy 01    Ht Readings from Last 1 Encounters:   02/06/18 162.6 cm (64\")        Wt Readings from Last 1 Encounters:   02/06/18 59 kg (130 lb)        Previous Regimen NA   Last Level NA   Other Current Antibiotics NA   Significant Cultures Pending   Serum Creatinine Lab Results   Component Value Date/Time    Creatinine 2.63 (H) 11/29/2017 04:12 PM      Creatinine Clearance Estimated Creatinine Clearance: 26.2 mL/min (based on Cr of 2.63). BUN Lab Results   Component Value Date/Time    BUN 34 (H) 11/29/2017 04:12 PM      WBC Lab Results   Component Value Date/Time    WBC 10.0 11/29/2017 04:12 PM      H/H Lab Results   Component Value Date/Time    HGB 8.5 (L) 11/29/2017 04:12 PM      Platelets Lab Results   Component Value Date/Time    PLATELET 255 52/35/2850 04:12 PM      Temp 98.2 °F (36.8 °C)     Start Vancomycin therapy, with loading dose of 1 gm IV @ 16:00 [id number]. Further dosing TBD after current BMP results have been evaluated, although Hx of renal back in Nov2017 were poor. Dose calculated to approximate a therapeutic trough of 10-15 mcg/mL. Pharmacy to follow daily and will make changes to dose and/or frequency based on clinical status. Alfredo Cano, Pharm. D.   Clinical Pharmacist  [phone]

## 2018-02-06 NOTE — IP AVS SNAPSHOT
[facility] 
 
 
 [address] [address] 
[phone] Patient: Bob Ford MRN: [id number] About your hospitalization You were admitted on:  February 6, 2018 You last received care in the:  [address] [place] [address] You were discharged on:  February 11, 2018 Why you were hospitalized Your primary diagnosis was:  Cellulitis And Abscess Of Trunk Your diagnoses also included:  Cellulitis, Leukocytosis, Substance Induced Mood Disorder (Hcc), Chronic Hepatitis C (Hcc), Acute Renal Failure Superimposed On Chronic Kidney Disease (Hcc), Ckd (Chronic Kidney Disease) Stage 3, Gfr 30-59 Ml/Min, Sickle Cell Anemia (Hcc) Follow-up Information Follow up With Details Comments Contact Info CSB   Chosen to continue managing your healthcare needs Leonarda (MILAD) [facility] 
[phone] [facility] 
Medical Records [phone] [place] [address] Medical Records [phone] Wound Care Clinic On 2/12/2018 Follow up appointment scheduled for Monday, February 12, 2018 at 10:00 a.m. [address] [facility] [address] [place] [address] - [address] 
[phone] Lindsay Willis MD   Patient can only remember the practice name and not the physician Lindsay Willis MD In 3 days  Patient can only remember the practice name and not the physician 
  
 nephrology -Dr. Rakesh Sanchez  In 1 week    
 drug rehab  In 3 days Dr. Monroe Rivera for HCV In 1 week Your Scheduled Appointments Monday February 12, 2018 10:00 AM EST  
WOUND CARE NURSE VISIT with WOUND NURSE  
[facility]) [address] [address]  
[phone] Patients scheduled for OP Wound Care visits should enter the [facility], 2nd floor, [address] for check in. Discharge Orders None A check naty indicates which time of day the medication should be taken. My Medications START taking these medications Instructions Each Dose to Equal  
 Morning Noon Evening Bedtime  
 levoFLOXacin 500 mg tablet Commonly known as:  Mateo Roberson Your last dose was: Your next dose is: Take 1 Tab by mouth every fourty-eight (48) hours. 500 mg CONTINUE taking these medications Instructions Each Dose to Equal  
 Morning Noon Evening Bedtime  
 amLODIPine 10 mg tablet Commonly known as:  Viktoriya Mondragon Your last dose was: Your next dose is: Take 1 Tab by mouth daily. Indications: Chronic Stable Angina Pectoris 10 mg  
    
   
   
   
  
 baclofen 10 mg tablet Commonly known as:  LIORESAL Your last dose was: Your next dose is: Take 10 mg by mouth three (3) times daily. 10 mg  
    
   
   
   
  
 cyanocobalamin 100 mcg tablet Commonly known as:  VITAMIN B-12 Your last dose was: Your next dose is: Take 1 Tab by mouth daily. Indications: PREVENTION OF VITAMIN B12 DEFICIENCY  
 100 mcg  
    
   
   
   
  
 folic acid 1 mg tablet Commonly known as:  Google Your last dose was: Your next dose is: Take 1 Tab by mouth daily. Indications: Folate Deficiency 1 mg  
    
   
   
   
  
 hydrALAZINE 25 mg tablet Commonly known as:  APRESOLINE Your last dose was: Your next dose is: Take 25 mg by mouth three (3) times daily. 25 mg  
    
   
   
   
  
 labetalol 200 mg tablet Commonly known as:  Radha Vanlue Your last dose was: Your next dose is: Take 200 mg by mouth two (2) times a day.   
 200 mg  
    
   
   
   
  
 LIPITOR 10 mg tablet Generic drug:  atorvastatin Your last dose was: Your next dose is: Take 10 mg by mouth daily. 10 mg  
    
   
   
   
  
 pyridoxine (vitamin B6) 100 mg tablet Commonly known as:  VITAMIN B-6 Your last dose was: Your next dose is: Take 1 Tab by mouth daily. Indications: DRUG-INDUCED PYRIDOXINE DEFICIENCY  
 100 mg ZOLOFT 100 mg tablet Generic drug:  sertraline Your last dose was: Your next dose is: Take 100 mg by mouth daily. 100 mg Where to Get Your Medications These medications were sent to [facility], [address] - [address] [address]  [address] [address], [place] Phone:  [phone]  
  levoFLOXacin 500 mg tablet Discharge Instructions Skin Abscess: Care Instructions Your Care Instructions A skin abscess is a bacterial infection that forms a pocket of pus. A boil is a kind of skin abscess. The doctor may have cut an opening in the abscess so that the pus can drain out. You may have gauze in the cut so that the abscess will stay open and keep draining. You may need antibiotics. You will need to follow up with your doctor to make sure the infection has gone away. The doctor has checked you carefully, but problems can develop later. If you notice any problems or new symptoms, get medical treatment right away. Follow-up care is a key part of your treatment and safety. Be sure to make and go to all appointments, and call your doctor if you are having problems. It's also a good idea to know your test results and keep a list of the medicines you take. How can you care for yourself at home? · Apply warm and dry compresses, a heating pad set on low, or a hot water bottle 3 or 4 times a day for pain. Keep a cloth between the heat source and your skin. · If your doctor prescribed antibiotics, take them as directed. Do not stop taking them just because you feel better. You need to take the full course of antibiotics. · Take pain medicines exactly as directed. ¨ If the doctor gave you a prescription medicine for pain, take it as prescribed. ¨ If you are not taking a prescription pain medicine, ask your doctor if you can take an over-the-counter medicine. · Keep your bandage clean and dry. Change the bandage whenever it gets wet or dirty, or at least one time a day. · If the abscess was packed with gauze: 
¨ Keep follow-up appointments to have the gauze changed or removed. If the doctor instructed you to remove the gauze, gently pull out all of the gauze when your doctor tells you to. ¨ After the gauze is removed, soak the area in warm water for 15 to 20 minutes 2 times a day, until the wound closes. When should you call for help? Call your doctor now or seek immediate medical care if: 
? · You have signs of worsening infection, such as: 
¨ Increased pain, swelling, warmth, or redness. ¨ Red streaks leading from the infected skin. ¨ Pus draining from the wound. ¨ A fever. ? Watch closely for changes in your health, and be sure to contact your doctor if: 
? · You do not get better as expected. Where can you learn more? Go to http://harman-rosalio.info/. Enter I829 in the search box to learn more about \"Skin Abscess: Care Instructions. \" Current as of: October 13, 2016 Content Version: 11.4 © 5732-4278 [employer]. Care instructions adapted under license by [employer] (which disclaims liability or warranty for this information). If you have questions about a medical condition or this instruction, always ask your healthcare professional. [place] [address] any warranty or liability for your use of this information. Mobile Share[employer]t Announcement We are excited to announce that we are making your provider's discharge notes available to you in [employer]. You will see these notes when they are completed and signed by the physician that discharged you from your recent hospital stay. If you have any questions or concerns about any information you see in [employer], please call the Health Information Department where you were seen or reach out to your Primary Care Provider for more information about your plan of care. Introducing [facility]! [facility] introduces [employer] patient portal. Now you can access parts of your medical record, email your doctor's office, and request medication refills online. 1. In your internet browser, go to [email or web address] 2. Click on the First Time User? Click Here link in the Sign In box. You will see the New Member Sign Up page. 3. Enter your [employer] Access Code exactly as it appears below. You will not need to use this code after youve completed the sign-up process. If you do not sign up before the expiration date, you must request a new code. · [employer] Access Code: [email or web address] Expires: 3/1/2018 12:49 PM 
 
4. Enter the last four digits of your Social Security Number (xxxx) and Date of Birth (mm/dd/yyyy) as indicated and click Submit. You will be taken to the next sign-up page. 5. Create a [employer] ID. This will be your [employer] login ID and cannot be changed, so think of one that is secure and easy to remember. 6. Create a [employer] password. You can change your password at any time. 7. Enter your Password Reset Question and Answer. This can be used at a later time if you forget your password. 8. Enter your e-mail address. You will receive e-mail notification when new information is available in [address] 9. Click Sign Up. You can now view and download portions of your medical record.  
10. Click the Download Summary menu link to download a portable copy of your medical information. If you have questions, please visit the Frequently Asked Questions section of the [employer] website. Remember, [employer] is NOT to be used for urgent needs. For medical emergencies, dial 911. Now available from your iPhone and Android! Unresulted Labs-Please follow up with your PCP about these lab tests Order Current Status CULTURE, BLOOD Preliminary result Providers Seen During Your Hospitalization Provider Specialty Primary office phone Tonya Awan MD Emergency Medicine [phone] April Camargo MD Internal Medicine [phone] Your Primary Care Physician (PCP) Primary Care Physician Office Phone Office Fax OTHER, PHYS ** None ** ** None ** You are allergic to the following No active allergies Recent Documentation Height Weight BMI Smoking Status 1.626 m 59.2 kg 22.4 kg/m2 Current Every Day Smoker Emergency Contacts Name Discharge Info Relation Home Work Mobile Lolita Kirkland CAREGIVER [3] Daughter [21] [phone] Patient Belongings The following personal items are in your possession at time of discharge: 
  Dental Appliances: None  Visual Aid: None      Home Medications: None   Jewelry: None  Clothing: At bedside, Footwear, Hat, Jacket/Coat, Pants, [place], Socks    Other Valuables: None Please provide this summary of care documentation to your next provider. Signatures-by signing, you are acknowledging that this After Visit Summary has been reviewed with you and you have received a copy. Patient Signature:  ____________________________________________________________ Date:  ____________________________________________________________  
  
Valeriano Calhoun Provider Signature:  ____________________________________________________________ Date:  ____________________________________________________________

## 2018-02-06 NOTE — ED TRIAGE NOTES
C/o abd pain and vomiting today. Sepsis Screening completed    (  )Patient meets SIRS criteria. ( x )Patient does not meet SIRS criteria.       SIRS Criteria is achieved when two or more of the following are present   Temperature < 96.8°F (36°C) or > 100.9°F (38.3°C)   Heart Rate > 90 beats per minute   Respiratory Rate > 20 breaths per minute   WBC count > 12,000 or <4,000 or > 10% bands
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
## 2018-02-06 NOTE — IP AVS SNAPSHOT
[facility] [id number] 
[phone] Patient: Lalo Salmon MRN: [id number] AHS:[birth year] A check naty indicates which time of day the medication should be taken. My Medications START taking these medications Instructions Each Dose to Equal  
 Morning Noon Evening Bedtime  
 levoFLOXacin 500 mg tablet Commonly known as:  Mitra Hutson Your last dose was: Your next dose is: Take 1 Tab by mouth every fourty-eight (48) hours. 500 mg CONTINUE taking these medications Instructions Each Dose to Equal  
 Morning Noon Evening Bedtime  
 amLODIPine 10 mg tablet Commonly known as:  Lisa Arnold Your last dose was: Your next dose is: Take 1 Tab by mouth daily. Indications: Chronic Stable Angina Pectoris 10 mg  
    
   
   
   
  
 baclofen 10 mg tablet Commonly known as:  LIORESAL Your last dose was: Your next dose is: Take 10 mg by mouth three (3) times daily. 10 mg  
    
   
   
   
  
 cyanocobalamin 100 mcg tablet Commonly known as:  VITAMIN B-12 Your last dose was: Your next dose is: Take 1 Tab by mouth daily. Indications: PREVENTION OF VITAMIN B12 DEFICIENCY  
 100 mcg  
    
   
   
   
  
 folic acid 1 mg tablet Commonly known as:  [employer] Your last dose was: Your next dose is: Take 1 Tab by mouth daily. Indications: Folate Deficiency 1 mg  
    
   
   
   
  
 hydrALAZINE 25 mg tablet Commonly known as:  APRESOLINE Your last dose was: Your next dose is: Take 25 mg by mouth three (3) times daily. 25 mg  
    
   
   
   
  
 labetalol 200 mg tablet Commonly known as:  Shah Lisandro Your last dose was: Your next dose is: Take 200 mg by mouth two (2) times a day.   
 200 mg  
    
   
   
   
  
 LIPITOR 10 mg tablet Generic drug:  atorvastatin Your last dose was: Your next dose is: Take 10 mg by mouth daily. 10 mg  
    
   
   
   
  
 pyridoxine (vitamin B6) 100 mg tablet Commonly known as:  VITAMIN B-6 Your last dose was: Your next dose is: Take 1 Tab by mouth daily. Indications: DRUG-INDUCED PYRIDOXINE DEFICIENCY  
 100 mg ZOLOFT 100 mg tablet Generic drug:  sertraline Your last dose was: Your next dose is: Take 100 mg by mouth daily. 100 mg Where to Get Your Medications These medications were sent to [place], [place] - [address] [address]  [address] [address], [place] [address] Phone:  [phone]  
  levoFLOXacin 500 mg tablet

## 2018-02-06 NOTE — IP AVS SNAPSHOT
Summary of Care Report The Summary of Care report has been created to help improve care coordination. Users with access to [employer] or [address] (Web-based application) may access additional patient information including the Discharge Summary. If you are not currently a [address] user and need more information, please call the number listed below in the [place] [address] section and ask to be connected with Medical Records. Facility Information Name Address Phone [address] [id number] [phone] Patient Information Patient Name Sex JUN Shah ([id number]) Male 1960 Discharge Information Admitting Provider Service Area Unit Dulcy Cranker, MD / [address] [place] [address] Surg/Onco / [phone] Discharge Provider Discharge Date/Time Discharge Disposition Destination (none) 2018 (Pending) AHR (none) Patient Language Language ENGLISH [13] Hospital Problems as of 2018  Reviewed: 2018  9:35 PM by Dulcy Cranker, MD  
  
  
  
 Class Noted - Resolved Last Modified POA Active Problems Sickle cell anemia (HCC) (Chronic)  2014 - Present 2018 by Jean-Paul Breen PA-C Yes Entered by Dolores Chatterjee DO Chronic hepatitis C ([facility] [address] (Chronic)  2014 - Present 2018 by Dulcy Cranker, MD Yes Entered by Dolores Chatterjee DO  
  CKD (chronic kidney disease) stage 3, GFR 30-59 ml/min  3/21/2017 - Present 2018 by Jean-Paul Breen PA-C Yes Entered by Brandy Busby MD  
  Substance induced mood disorder ([facility] [address]  2017 - Present 2018 by Dulcy Cranker, MD Yes Entered by Luis Saeed MD  
  Leukocytosis  2018 - Present 2018 by Favio Ghosh MD Yes   Entered by Efrain Harrell MD  
 * (Principal)Cellulitis and abscess of trunk  2/6/2018 - Present 2/8/2018 by Lexis Tejada MD Yes Entered by Connie Shaffer MD  
  Acute renal failure superimposed on chronic kidney disease ([facility] [address]  2/6/2018 - Present 2/8/2018 by Lexis Tejada MD Yes Entered by Connie Shaffer MD  
  
Non-Hospital Problems as of 2/11/2018  Reviewed: 2/6/2018  9:35 PM by Connie Shaffer MD  
  
  
  
 Class Noted - Resolved Last Modified Active Problems ETOH abuse (Chronic)  11/4/2014 - Present 11/8/2014 Entered by Dionisio Whatley DO  
  MDD (major depressive disorder)  10/26/2015 - Present 10/26/2015 by Virginia Arriola Entered by Virginia Arriola  
  Drug abuse  11/20/2016 - Present 11/20/2016 by Timmy Woo Entered by Timmy Woo You are allergic to the following No active allergies Current Discharge Medication List  
  
START taking these medications Dose & Instructions Dispensing Information Comments  
 levoFLOXacin 500 mg tablet Commonly known as:  Mitra Hutson Dose:  500 mg Take 1 Tab by mouth every fourty-eight (48) hours. Quantity:  5 Tab Refills:  0 CONTINUE these medications which have NOT CHANGED Dose & Instructions Dispensing Information Comments  
 amLODIPine 10 mg tablet Commonly known as:  Lisa Arnold Dose:  10 mg Take 1 Tab by mouth daily. Indications: Chronic Stable Angina Pectoris Quantity:  7 Tab Refills:  0  
   
 baclofen 10 mg tablet Commonly known as:  LIORESAL Dose:  10 mg Take 10 mg by mouth three (3) times daily. Refills:  0  
   
 cyanocobalamin 100 mcg tablet Commonly known as:  VITAMIN B-12 Dose:  100 mcg Take 1 Tab by mouth daily. Indications: PREVENTION OF VITAMIN B12 DEFICIENCY Quantity:  7 Tab Refills:  0  
   
 folic acid 1 mg tablet Commonly known as:  Google Dose:  1 mg Take 1 Tab by mouth daily. Indications: Folate Deficiency Quantity:  7 Tab Refills:  0 hydrALAZINE 25 mg tablet Commonly known as:  APRESOLINE Dose:  25 mg Take 25 mg by mouth three (3) times daily. Refills:  0  
   
 labetalol 200 mg tablet Commonly known as:  Michaelyn Lj Dose:  200 mg Take 200 mg by mouth two (2) times a day. Refills:  0 LIPITOR 10 mg tablet Generic drug:  atorvastatin Dose:  10 mg Take 10 mg by mouth daily. Refills:  0  
   
 pyridoxine (vitamin B6) 100 mg tablet Commonly known as:  VITAMIN B-6 Dose:  100 mg Take 1 Tab by mouth daily. Indications: DRUG-INDUCED PYRIDOXINE DEFICIENCY Quantity:  7 Tab Refills:  0  
   
 ZOLOFT 100 mg tablet Generic drug:  sertraline Dose:  100 mg Take 100 mg by mouth daily. Refills:  0 Current Immunizations Name Date Influenza Vaccine 10/17/2014 Influenza Vaccine (Quad) PF 10/18/2017, 11/21/2016, 10/23/2015 Pneumococcal Polysaccharide (PPSV-23) 11/8/2014 Follow-up Information Follow up With Details Comments Contact Info [facility]   Chosen to continue managing your healthcare needs [facility] ([facility]) [facility]. [facility] 
[phone] [address] [place] [address] 
Medical Records [phone] [place] [address] Medical Records [phone] [facility] On 2/12/2018 Follow up appointment scheduled for Monday, February 12, 2018 at 10:00 a.m. [address] [place] [address], [address] 
[phone] Lindsay Willis MD   Patient can only remember the practice name and not the physician Lindsay Willis MD In 3 days  Patient can only remember the practice name and not the physician 
  
 nephrology -Dr. Yue Sharma  In 1 week    
 drug rehab  In 3 days Dr. Marvin Hartman for HCV In 1 week Discharge Instructions Skin Abscess: Care Instructions Your Care Instructions A skin abscess is a bacterial infection that forms a pocket of pus. A boil is a kind of skin abscess. The doctor may have cut an opening in the abscess so that the pus can drain out. You may have gauze in the cut so that the abscess will stay open and keep draining. You may need antibiotics. You will need to follow up with your doctor to make sure the infection has gone away. The doctor has checked you carefully, but problems can develop later. If you notice any problems or new symptoms, get medical treatment right away. Follow-up care is a key part of your treatment and safety. Be sure to make and go to all appointments, and call your doctor if you are having problems. It's also a good idea to know your test results and keep a list of the medicines you take. How can you care for yourself at home? · Apply warm and dry compresses, a heating pad set on low, or a hot water bottle 3 or 4 times a day for pain. Keep a cloth between the heat source and your skin. · If your doctor prescribed antibiotics, take them as directed. Do not stop taking them just because you feel better. You need to take the full course of antibiotics. · Take pain medicines exactly as directed. ¨ If the doctor gave you a prescription medicine for pain, take it as prescribed. ¨ If you are not taking a prescription pain medicine, ask your doctor if you can take an over-the-counter medicine. · Keep your bandage clean and dry. Change the bandage whenever it gets wet or dirty, or at least one time a day. · If the abscess was packed with gauze: 
¨ Keep follow-up appointments to have the gauze changed or removed. If the doctor instructed you to remove the gauze, gently pull out all of the gauze when your doctor tells you to. ¨ After the gauze is removed, soak the area in warm water for 15 to 20 minutes 2 times a day, until the wound closes. When should you call for help? Call your doctor now or seek immediate medical care if: 
? · You have signs of worsening infection, such as: 
¨ Increased pain, swelling, warmth, or redness. ¨ Red streaks leading from the infected skin. ¨ Pus draining from the wound. ¨ A fever. ? Watch closely for changes in your health, and be sure to contact your doctor if: 
? · You do not get better as expected. Where can you learn more? Go to http://harman-rosalio.info/. Enter X826 in the search box to learn more about \"Skin Abscess: Care Instructions. \" Current as of: October 13, 2016 Content Version: 11.4 © 9874-5115 [employer]. Care instructions adapted under license by [employer] (which disclaims liability or warranty for this information). If you have questions about a medical condition or this instruction, always ask your healthcare professional. [place] [address] any warranty or liability for your use of this information. Chart Review Routing History Recipient Method Report Sent By Becca Neal DO Phone: [phone] In [employer] IP Auto Routed [employer] [place], [place] [[id number]] 11/10/2014  5:09 PM 11/10/2014 Caroline Noland MD  
Fax: [phone] Phone: [phone] Fax IP Auto Routed [facility] Caroline Noland [[id number]] 10/27/2015  1:31 PM 10/27/2015 Jayden Sanchez MD  
Fax: [phone] Phone: [phone] Fax IP Auto Routed Ellie Chung MD [[id number]] 3/26/2017  2:12 PM 03/26/2017 Josiah Josue MD  
Phone: [phone] In [employer] Routed [employer], MD [[id number]] 11/27/2017  7:45 PM 11/27/2017

## 2018-02-07 LAB
ANION GAP SERPL CALC-SCNC: 9 MMOL/L (ref 3–18)
BUN SERPL-MCNC: 43 MG/DL (ref 7–18)
BUN/CREAT SERPL: 12 (ref 12–20)
CALCIUM SERPL-MCNC: 7.8 MG/DL (ref 8.5–10.1)
CHLORIDE SERPL-SCNC: 111 MMOL/L (ref 100–108)
CO2 SERPL-SCNC: 20 MMOL/L (ref 21–32)
CREAT SERPL-MCNC: 3.71 MG/DL (ref 0.6–1.3)
GLUCOSE SERPL-MCNC: 112 MG/DL (ref 74–99)
POTASSIUM SERPL-SCNC: 5.4 MMOL/L (ref 3.5–5.5)
SODIUM SERPL-SCNC: 140 MMOL/L (ref 136–145)
VANCOMYCIN SERPL-MCNC: 8.6 UG/ML (ref 5–40)

## 2018-02-07 PROCEDURE — 36430 TRANSFUSION BLD/BLD COMPNT: CPT

## 2018-02-07 PROCEDURE — 74011250637 HC RX REV CODE- 250/637: Performed by: INTERNAL MEDICINE

## 2018-02-07 PROCEDURE — 74011250636 HC RX REV CODE- 250/636: Performed by: INTERNAL MEDICINE

## 2018-02-07 PROCEDURE — 86920 COMPATIBILITY TEST SPIN: CPT | Performed by: INTERNAL MEDICINE

## 2018-02-07 PROCEDURE — P9016 RBC LEUKOCYTES REDUCED: HCPCS | Performed by: INTERNAL MEDICINE

## 2018-02-07 PROCEDURE — 80202 ASSAY OF VANCOMYCIN: CPT | Performed by: INTERNAL MEDICINE

## 2018-02-07 PROCEDURE — 80048 BASIC METABOLIC PNL TOTAL CA: CPT | Performed by: INTERNAL MEDICINE

## 2018-02-07 PROCEDURE — 86921 COMPATIBILITY TEST INCUBATE: CPT | Performed by: INTERNAL MEDICINE

## 2018-02-07 PROCEDURE — 74011000258 HC RX REV CODE- 258: Performed by: INTERNAL MEDICINE

## 2018-02-07 PROCEDURE — 74011250637 HC RX REV CODE- 250/637: Performed by: PHYSICIAN ASSISTANT

## 2018-02-07 PROCEDURE — 36415 COLL VENOUS BLD VENIPUNCTURE: CPT | Performed by: INTERNAL MEDICINE

## 2018-02-07 PROCEDURE — 65270000029 HC RM PRIVATE

## 2018-02-07 PROCEDURE — 86922 COMPATIBILITY TEST ANTIGLOB: CPT | Performed by: INTERNAL MEDICINE

## 2018-02-07 PROCEDURE — 86900 BLOOD TYPING SEROLOGIC ABO: CPT | Performed by: INTERNAL MEDICINE

## 2018-02-07 PROCEDURE — 74011250636 HC RX REV CODE- 250/636: Performed by: PHYSICIAN ASSISTANT

## 2018-02-07 RX ORDER — ATORVASTATIN CALCIUM 10 MG/1
10 TABLET, FILM COATED ORAL DAILY
Status: DISCONTINUED | OUTPATIENT
Start: 2018-02-08 | End: 2018-02-11 | Stop reason: HOSPADM

## 2018-02-07 RX ORDER — BACLOFEN 10 MG/1
10 TABLET ORAL 3 TIMES DAILY
COMMUNITY

## 2018-02-07 RX ORDER — SERTRALINE HYDROCHLORIDE 100 MG/1
100 TABLET, FILM COATED ORAL DAILY
COMMUNITY

## 2018-02-07 RX ORDER — HYDRALAZINE HYDROCHLORIDE 25 MG/1
25 TABLET, FILM COATED ORAL 3 TIMES DAILY
COMMUNITY

## 2018-02-07 RX ORDER — LABETALOL 200 MG/1
200 TABLET, FILM COATED ORAL 2 TIMES DAILY
COMMUNITY

## 2018-02-07 RX ORDER — HYDRALAZINE HYDROCHLORIDE 25 MG/1
25 TABLET, FILM COATED ORAL 3 TIMES DAILY
Status: DISCONTINUED | OUTPATIENT
Start: 2018-02-07 | End: 2018-02-11 | Stop reason: HOSPADM

## 2018-02-07 RX ORDER — ATORVASTATIN CALCIUM 10 MG/1
10 TABLET, FILM COATED ORAL DAILY
COMMUNITY

## 2018-02-07 RX ORDER — MORPHINE SULFATE 4 MG/ML
1 INJECTION, SOLUTION INTRAMUSCULAR; INTRAVENOUS
Status: DISCONTINUED | OUTPATIENT
Start: 2018-02-07 | End: 2018-02-07

## 2018-02-07 RX ORDER — SERTRALINE HYDROCHLORIDE 50 MG/1
100 TABLET, FILM COATED ORAL DAILY
Status: DISCONTINUED | OUTPATIENT
Start: 2018-02-08 | End: 2018-02-11 | Stop reason: HOSPADM

## 2018-02-07 RX ORDER — LABETALOL 200 MG/1
200 TABLET, FILM COATED ORAL 2 TIMES DAILY
Status: DISCONTINUED | OUTPATIENT
Start: 2018-02-07 | End: 2018-02-11 | Stop reason: HOSPADM

## 2018-02-07 RX ORDER — HYDROMORPHONE HYDROCHLORIDE 1 MG/ML
1 INJECTION, SOLUTION INTRAMUSCULAR; INTRAVENOUS; SUBCUTANEOUS
Status: DISCONTINUED | OUTPATIENT
Start: 2018-02-07 | End: 2018-02-08

## 2018-02-07 RX ORDER — MORPHINE SULFATE 2 MG/ML
1 INJECTION, SOLUTION INTRAMUSCULAR; INTRAVENOUS
Status: DISCONTINUED | OUTPATIENT
Start: 2018-02-07 | End: 2018-02-07

## 2018-02-07 RX ADMIN — SODIUM CHLORIDE 2.25 G: 900 INJECTION, SOLUTION INTRAVENOUS at 23:45

## 2018-02-07 RX ADMIN — Medication 10 ML: at 12:14

## 2018-02-07 RX ADMIN — Medication 10 ML: at 16:56

## 2018-02-07 RX ADMIN — SODIUM CHLORIDE 100 ML/HR: 900 INJECTION, SOLUTION INTRAVENOUS at 21:39

## 2018-02-07 RX ADMIN — TRAZODONE HYDROCHLORIDE 100 MG: 100 TABLET ORAL at 21:38

## 2018-02-07 RX ADMIN — LABETALOL HYDROCHLORIDE 200 MG: 200 TABLET, FILM COATED ORAL at 21:37

## 2018-02-07 RX ADMIN — VANCOMYCIN HYDROCHLORIDE: 1 INJECTION, POWDER, LYOPHILIZED, FOR SOLUTION INTRAVENOUS at 17:48

## 2018-02-07 RX ADMIN — VITAM B12 100 MCG: 100 TAB at 09:18

## 2018-02-07 RX ADMIN — Medication 1 MG: at 19:08

## 2018-02-07 RX ADMIN — Medication 10 ML: at 21:39

## 2018-02-07 RX ADMIN — HEPARIN SODIUM 5000 UNITS: 5000 INJECTION, SOLUTION INTRAVENOUS; SUBCUTANEOUS at 16:53

## 2018-02-07 RX ADMIN — HEPARIN SODIUM 5000 UNITS: 5000 INJECTION, SOLUTION INTRAVENOUS; SUBCUTANEOUS at 23:46

## 2018-02-07 RX ADMIN — HYDROMORPHONE HYDROCHLORIDE 1 MG: 1 INJECTION, SOLUTION INTRAMUSCULAR; INTRAVENOUS; SUBCUTANEOUS at 12:11

## 2018-02-07 RX ADMIN — TRAZODONE HYDROCHLORIDE 100 MG: 100 TABLET ORAL at 00:16

## 2018-02-07 RX ADMIN — HYDRALAZINE HYDROCHLORIDE 25 MG: 25 TABLET, FILM COATED ORAL at 16:52

## 2018-02-07 RX ADMIN — LABETALOL HYDROCHLORIDE 200 MG: 200 TABLET, FILM COATED ORAL at 12:11

## 2018-02-07 RX ADMIN — HYDROMORPHONE HYDROCHLORIDE 1 MG: 1 INJECTION, SOLUTION INTRAMUSCULAR; INTRAVENOUS; SUBCUTANEOUS at 06:06

## 2018-02-07 RX ADMIN — FOLIC ACID 1 MG: 1 TABLET ORAL at 09:18

## 2018-02-07 RX ADMIN — Medication 1 MG: at 15:13

## 2018-02-07 RX ADMIN — SODIUM CHLORIDE 2.25 G: 900 INJECTION, SOLUTION INTRAVENOUS at 00:12

## 2018-02-07 RX ADMIN — Medication 10 ML: at 00:13

## 2018-02-07 RX ADMIN — CARVEDILOL 6.25 MG: 3.12 TABLET, FILM COATED ORAL at 07:58

## 2018-02-07 RX ADMIN — SODIUM CHLORIDE 100 ML/HR: 900 INJECTION, SOLUTION INTRAVENOUS at 00:12

## 2018-02-07 RX ADMIN — SODIUM CHLORIDE 2.25 G: 900 INJECTION, SOLUTION INTRAVENOUS at 16:52

## 2018-02-07 RX ADMIN — SODIUM CHLORIDE 2.25 G: 900 INJECTION, SOLUTION INTRAVENOUS at 11:36

## 2018-02-07 RX ADMIN — SODIUM BICARBONATE 650 MG TABLET 650 MG: at 09:18

## 2018-02-07 RX ADMIN — HEPARIN SODIUM 5000 UNITS: 5000 INJECTION, SOLUTION INTRAVENOUS; SUBCUTANEOUS at 06:41

## 2018-02-07 RX ADMIN — SODIUM CHLORIDE 2.25 G: 900 INJECTION, SOLUTION INTRAVENOUS at 05:00

## 2018-02-07 RX ADMIN — Medication 100 MG: at 09:18

## 2018-02-07 RX ADMIN — HEPARIN SODIUM 5000 UNITS: 5000 INJECTION, SOLUTION INTRAVENOUS; SUBCUTANEOUS at 00:12

## 2018-02-07 RX ADMIN — AMLODIPINE BESYLATE 10 MG: 5 TABLET ORAL at 09:18

## 2018-02-07 RX ADMIN — HYDROMORPHONE HYDROCHLORIDE 1 MG: 1 INJECTION, SOLUTION INTRAMUSCULAR; INTRAVENOUS; SUBCUTANEOUS at 00:13

## 2018-02-07 RX ADMIN — HYDRALAZINE HYDROCHLORIDE 25 MG: 25 TABLET, FILM COATED ORAL at 21:37

## 2018-02-07 RX ADMIN — HYDROMORPHONE HYDROCHLORIDE 1 MG: 1 INJECTION, SOLUTION INTRAMUSCULAR; INTRAVENOUS; SUBCUTANEOUS at 21:37

## 2018-02-07 RX ADMIN — SODIUM BICARBONATE 650 MG TABLET 650 MG: at 21:38

## 2018-02-07 NOTE — PROGRESS NOTES
Pharmacy Dosing Services: Vancomycin    Consult for Vancomycin Dosing by Pharmacy by Dr. Michelle Parikh provided for this 65y.o. year old male , for indication of Skin and soft tissue infection. Day of Therapy 2  Scr = 3.71   CrCl = 18.6 ml/min       Pt received initial dose: Vancomycin 1000 mg IV once, administered 2/6/18 at 16:36  Vancomycin Random Level: 8.6 (2/7/18 at 14:27)      Vancomycin 1000 mg IV once, ordered for today (2/7/18) at 17:00. Goal trough: ~ 15    Subsequent dosing to be determined based on renal function and levels. Ht Readings from Last 1 Encounters:   02/06/18 162.6 cm (64\")        Wt Readings from Last 1 Encounters:   02/07/18 61.7 kg (136 lb 1.6 oz)        Other Current Antibiotics Zosyn   Significant Cultures pending   Serum Creatinine Lab Results   Component Value Date/Time    Creatinine 3.71 (H) 02/07/2018 12:07 AM      Creatinine Clearance Estimated Creatinine Clearance: 18.6 mL/min (based on Cr of 3.71). BUN Lab Results   Component Value Date/Time    BUN 43 (H) 02/07/2018 12:07 AM      WBC Lab Results   Component Value Date/Time    WBC 18.3 (H) 02/06/2018 04:25 PM      H/H Lab Results   Component Value Date/Time    HGB 6.3 (L) 02/06/2018 04:25 PM      Platelets Lab Results   Component Value Date/Time    PLATELET 486 16/50/0625 04:25 PM      Temp 98.3 °F (36.8 °C)     Pharmacy to follow daily and will make changes to dose and/or frequency based on clinical status.   Pharmacist Eric Escalante

## 2018-02-07 NOTE — ROUTINE PROCESS
TRANSFER - OUT REPORT:    Verbal report given to Alesha(name) on [employer]  being transferred to remote tele(unit) for routine progression of care       Report consisted of patients Situation, Background, Assessment and   Recommendations(SBAR). Information from the following report(s) SBAR, ED Summary, Procedure Summary, MAR and Recent Results was reviewed with the receiving nurse. Lines:   Peripheral IV 02/06/18 Left; Inner Forearm (Active)   Site Assessment Clean, dry, & intact 2/6/2018  4:26 PM   Phlebitis Assessment 0 2/6/2018  4:26 PM   Infiltration Assessment 0 2/6/2018  4:26 PM   Dressing Status Clean, dry, & intact 2/6/2018  4:26 PM   Dressing Type Tape;Transparent 2/6/2018  4:26 PM   Hub Color/Line Status Pink;Capped;Flushed;Patent 2/6/2018  4:26 PM   Action Taken Blood drawn 2/6/2018  4:26 PM   Alcohol Cap Used Yes 2/6/2018  4:26 PM        Opportunity for questions and clarification was provided.       Patient transported with:   Monitor   Tech
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
## 2018-02-07 NOTE — ROUTINE PROCESS
Bedside and Verbal shift change report given to REBECCA Aguilar (oncoming nurse) by Tammi Koo (offgoing nurse). Report included the following information SBAR, Kardex, Intake/Output and MAR.

## 2018-02-07 NOTE — PROGRESS NOTES
Hospitalist Progress Note    Patient: Jovanny Singleton MRN: [id number]  CSN: [id number]    YOB: 1960  Age: 65 y.o. Sex: male    DOA: 2/6/2018 LOS:  LOS: 0 days          Chief Complaint:    Abdominal Wall Abscess and Cellulitis    Assessment/Plan     1. Abdominal Wall Abscess and Cellulitis  2. Chronic Hepatitis C  3. EDGAR  4. Anemia  5. Heroin and Cocaine Abuse    1. Continue vancomycin and zosyn. Upon admission Dr Aniceto Kate consulted general surgery. Dr Kelley Galarza saw the patient, recommended wound care consult and obtain wound cultures. Continue pain control as needed. 3. Cr improving after fluids this AM, however, still elevated. Will continue to monitor with daily BMP and IVF. 4. Patient is s/p one unit of PRBCs. Hx of sickle cell anemia. Will monitor. 5. Patient states that he would like to receive help for his addiction. Will inform CM and have them give patient recourses. DVT Prophylaxis - Heparin  Dispo: Pending response to therapy. Follow blood/wound cultures.     Patient Active Problem List   Diagnosis Code    Avascular necrosis of bone of right hip (HCC) M87.051    Sickle cell anemia (HCC) D57.1    ETOH abuse F10.10    Chronic hepatitis C (HCC) B18.2    Avascular necrosis of hip (HCC) M87.059    Dislocation of hip joint prosthesis (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 [address] D57.00    EDGAR (acute kidney injury) ([facility] [address] N17.9    Dehydration E86.0    Drug abuse F19.10    Acute hyperkalemia E87.5    Narcotic overdose T40.601A    Hyponatremia E87.1    Abscess of buttock, right L02.31    Pain of right hip joint M25.551    Bilateral pleural effusion J90    CKD (chronic kidney disease) stage 3, GFR 30-59 ml/min N18.3    Chest pain R07.9    Sickle cell pain crisis (HCC) D57.00    Depression F32.9    Substance induced mood disorder (HCC) F19.94    Cellulitis L03.90    Leukocytosis D72.829    Anemia D64.9    Abscess, abdomen ([facility] K65.1    Cellulitis and abscess of trunk L03.319, L02.219    Acute kidney injury ([facility] [address] N17.9       Subjective:    Patient states he is still in a good amount of pain. No other concerns or complaints at this time. Review of systems:    Constitutional: denies fevers, chills, myalgias  Respiratory: denies SOB, cough  Cardiovascular: denies chest pain, palpitations  Gastrointestinal: denies nausea, vomiting, diarrhea      Vital signs/Intake and Output:  Visit Vitals    /82 (BP 1 Location: Right arm, BP Patient Position: At rest)    Pulse 74    Temp 98.4 °F (36.9 °C)    Resp 18    Ht 5' 4\" (1.626 m)    Wt 61.7 kg (136 lb 1.6 oz)    SpO2 100%    BMI 23.36 kg/m2     Current Shift:     Last three shifts:  02/05 1901 - 02/07 0700  In: 0789 [P.O.:480; I.V.:1205]  Out: 1100 [Urine:1100]    Exam:    General: Well developed, alert, NAD, OX3  Head/Neck: NCAT, supple, No masses, No lymphadenopathy  CVS:Regular rate and rhythm, no M/R/G, S1/S2 heard, no thrill  Lungs:Clear to auscultation bilaterally, no wheezes, rhonchi, or rales  Abdomen: Soft, mild tenderness, No distention, Normal Bowel sounds, No hepatomegaly. 4x4 gauze with moderate bloody weeping in LLQ. Midline incisional scar.   Extremities: No C/C/E, pulses palpable 2+  Skin:normal texture and turgor, no rashes, no lesions  Neuro:grossly normal , follows commands  Psych:appropriate                Labs: Results:       Chemistry Recent Labs      02/07/18   0007  02/06/18   1625   GLU  112*  108*   NA  140  141   K  5.4  5.2   CL  111*  110*   CO2  20*  18*   BUN  43*  47*   CREA  3.71*  4.34*   CA  7.8*  8.0*   AGAP  9  13   BUCR  12  11*   AP   --   71   TP   --   7.1   ALB   --   3.2*   GLOB   --   3.9   AGRAT   --   0.8      CBC w/Diff Recent Labs      02/06/18   1625   WBC  18.3*   RBC  2.13*   HGB  6.3*   HCT  18.1* PLT  202   GRANS  86*   LYMPH  8*   EOS  1      Cardiac Enzymes Recent Labs      02/06/18   1625   CPK  41   CKND1  3.2      Coagulation No results for input(s): PTP, INR, APTT in the last 72 hours. No lab exists for component: INREXT    Lipid Panel No results found for: CHOL, CHOLPOCT, CHOLX, CHLST, CHOLV, 283268, HDL, LDL, LDLC, DLDLP, 227795, VLDLC, VLDL, TGLX, TRIGL, TRIGP, TGLPOCT, CHHD, CHHDX   BNP No results for input(s): BNPP in the last 72 hours.    Liver Enzymes Recent Labs      02/06/18   1625   TP  7.1   ALB  3.2*   AP  71   SGOT  25      Thyroid Studies Lab Results   Component Value Date/Time    TSH 1.32 11/29/2017 05:39 AM        Procedures/imaging: see electronic medical records for all procedures/Xrays and details which were not copied into this note but were reviewed prior to creation of [address] Bobby Hurley, PA-C

## 2018-02-07 NOTE — PROGRESS NOTES
Shift Summary:  Patient required Dilaudid 1 mg IV x 2 overnight for pain to back and hip rating at 10/10. Stated that pain medicine was not holding for 6 hrs and would like to have more frequently. Reported to [address] [facility] and will discuss with Hospitalists this a.m. Patient edcuated on PRBC transfusion and consent signed and on chart. Patient received 1 unit of PRBCs overnight for H&H 6.3 and 18.1. Patient tolerated transfusion without sx of reaction. Vital signs remained stable throughout.

## 2018-02-07 NOTE — PROGRESS NOTES
Pharmacy Dosing Services: Zosyn     The following medication: Zosyn 4.5 gm IV q6h was automatically dose-adjusted to Zosyn 2.25 gm IV q6h per [facility] P&T Committee Protocol, with respect to renal function. Consult provided for this   65 y.o. , male , for the indication of skin and soft tissue infection (abdominal abscess). Pt Weight:   Wt Readings from Last 1 Encounters:   02/06/18 59 kg (130 lb)     Serum Creatinine Lab Results   Component Value Date/Time    Creatinine 4.34 (H) 02/06/2018 04:25 PM       Creatinine Clearance Estimated Creatinine Clearance: 15.9 mL/min (based on Cr of 4.34). BUN Lab Results   Component Value Date/Time    BUN 47 (H) 02/06/2018 04:25 PM         Pharmacy to continue to monitor patient daily. Will make dosage adjustments based upon changing renal function. Signed Juan Franco.  Contact information: [phone]
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
## 2018-02-07 NOTE — ED NOTES
Pt stable. Pt alert and oriented x4. No signs of acute distress. Pt with left forearm PIV. No signs of infiltration. Abdominal abcess drained by MD Hector Ko with packing. Dressing placed. Pt given medication for pain. Will reassess. Food box given as pt ok to eat per Dr. Hector Ko. WIll continue to monitor. Maintain safety precautions.

## 2018-02-07 NOTE — PROGRESS NOTES
2245: Patient received on the floor Tele box hooked up, waiting for  Orders for pain. 2315: Bedside shift change report given to Taniya Zarco Rn (oncoming nurse) by Ciara Pittman Rn (offgoing nurse). Report included the following information SBAR, Kardex, MAR and Recent Results.

## 2018-02-07 NOTE — PROGRESS NOTES
Admission Medication Reconciliation has been performed on this pt admitted through the ED consisting of interview of the patient regarding their PTA Home Medication List, Allergies and PMH as well as obtaining outpatient pharmacy information. Interviewed patient who is not a good historian and is not health literate. Patient did not provide a written list of home medications. Patient's outpatient pharmacy is [address] [place] [address],[address]  Pt is very drowsy and a poor historian. He reports his pharmacy as [facility] and he last took his home meds on Monday. Spoke w/ Raquel Ag at [facility] to obtain current med list.  He last filled his meds at the end of January. Medication Compliance Issues and/or Medication Concerns: updated PAML discussed clinically significant changes on rounds with PA [facility].     [address] [place] [address] Pharmacist  [phone]

## 2018-02-07 NOTE — PROGRESS NOTES
Pt admitted for to abdominal pain due to abdominal abscesses. .  Pt with a history of sickle cell anemia, Hep-C, heroin and cocaine abuse. Pt uses a standard walker to ambulate at times and lives with a roommate. Noted referral for assistance with drug rehab. Met with pt to discuss this. Pt has indicated he has [facility] up with [facility], however, [facility] is requesting pt's medical records from all of his hospital admissions to further assist with rehab. Pt has indicated he has not provided [facility] with this information at this time. CM will provide pt with the numbers to medical records at both [facility] and [facility], given pt has indicated these are the only facilities he has been to. Noted pt is self pay. Pt has indicated he has medicaid and will use medicaid cab for transport upon discharge. Pt has provided his insurance card. This information has been sent to insurance authorization for further review. Pt has indicated pt will also need assistance with a PCP to assist with medical follow up. CM to continue to follow. Readmission Risk Assessment:     Moderate Risk and MSSP/Good Help ACO patients    RRAT Score:  13-20    Initial Assessment: physician follow up vs [place]     Emergency Contact:   See face sheet    Pertinent Medical Hx:     See clinical     PCP/Specialists:  Pending [facility])      Community Services:   [facility]    DME:      Standard walker    Moderate Risk Care Transition Plan:  1. Evaluate for [place] or H2H, SNF, acute rehab, community care coordination of resources. 2. Involve patient/caregiver in assessment, planning, education and implement of intervention. 3. CM daily patient care huddles/interdisciplinary rounds. 4. PCP/Specialist appointment within 5  7 days made prior to discharge. 5. Facilitate transportation and logistics for follow-up appointments. 6. Medication reconciliation [id number] [facility]  7.  Formal handoff between hospital provider and post-acute provider to transition patient  Handoff to [facility] Nurse Navigator or PCP practice. Care Management Interventions  Mode of Transport at Discharge: Other (see comment) (medicaid taxi)  Transition of Care Consult (CM Consult): Discharge Planning  Health Maintenance Reviewed: Yes  Current Support Network: Other (has a room mate)  Confirm Follow Up Transport: Cab  Plan discussed with Pt/Family/Caregiver:  Yes

## 2018-02-08 ENCOUNTER — APPOINTMENT (OUTPATIENT)
Dept: ULTRASOUND IMAGING | Age: 58
DRG: 383 | End: 2018-02-08
Attending: HOSPITALIST
Payer: MEDICAID

## 2018-02-08 PROBLEM — M25.551 PAIN OF RIGHT HIP JOINT: Status: RESOLVED | Noted: 2017-03-20 | Resolved: 2018-02-08

## 2018-02-08 PROBLEM — N18.9 ACUTE RENAL FAILURE SUPERIMPOSED ON CHRONIC KIDNEY DISEASE (HCC): Status: ACTIVE | Noted: 2018-02-06

## 2018-02-08 PROBLEM — T40.601A NARCOTIC OVERDOSE (HCC): Status: RESOLVED | Noted: 2017-03-17 | Resolved: 2018-02-08

## 2018-02-08 PROBLEM — F32.A DEPRESSION: Status: RESOLVED | Noted: 2017-11-27 | Resolved: 2018-02-08

## 2018-02-08 PROBLEM — E87.1 HYPONATREMIA: Status: RESOLVED | Noted: 2017-03-17 | Resolved: 2018-02-08

## 2018-02-08 PROBLEM — L03.90 CELLULITIS: Status: RESOLVED | Noted: 2018-02-06 | Resolved: 2018-02-08

## 2018-02-08 PROBLEM — J90 BILATERAL PLEURAL EFFUSION: Status: RESOLVED | Noted: 2017-03-20 | Resolved: 2018-02-08

## 2018-02-08 LAB
ANION GAP SERPL CALC-SCNC: 10 MMOL/L (ref 3–18)
BASOPHILS # BLD: 0.1 K/UL (ref 0–0.06)
BASOPHILS NFR BLD: 0 % (ref 0–2)
BUN SERPL-MCNC: 38 MG/DL (ref 7–18)
BUN/CREAT SERPL: 10 (ref 12–20)
CALCIUM SERPL-MCNC: 8 MG/DL (ref 8.5–10.1)
CHLORIDE SERPL-SCNC: 109 MMOL/L (ref 100–108)
CO2 SERPL-SCNC: 19 MMOL/L (ref 21–32)
CREAT SERPL-MCNC: 3.74 MG/DL (ref 0.6–1.3)
DIFFERENTIAL METHOD BLD: ABNORMAL
EOSINOPHIL # BLD: 0.6 K/UL (ref 0–0.4)
EOSINOPHIL NFR BLD: 4 % (ref 0–5)
ERYTHROCYTE [DISTWIDTH] IN BLOOD BY AUTOMATED COUNT: 16.1 % (ref 11.6–14.5)
GLUCOSE SERPL-MCNC: 130 MG/DL (ref 74–99)
HCT VFR BLD AUTO: 17.1 % (ref 36–48)
HGB BLD-MCNC: 5.8 G/DL (ref 13–16)
LYMPHOCYTES # BLD: 2.7 K/UL (ref 0.9–3.6)
LYMPHOCYTES NFR BLD: 20 % (ref 21–52)
MCH RBC QN AUTO: 29 PG (ref 24–34)
MCHC RBC AUTO-ENTMCNC: 33.9 G/DL (ref 31–37)
MCV RBC AUTO: 85.5 FL (ref 74–97)
MONOCYTES # BLD: 0.9 K/UL (ref 0.05–1.2)
MONOCYTES NFR BLD: 6 % (ref 3–10)
NEUTS SEG # BLD: 9.7 K/UL (ref 1.8–8)
NEUTS SEG NFR BLD: 70 % (ref 40–73)
PLATELET # BLD AUTO: 156 K/UL (ref 135–420)
PMV BLD AUTO: 11.7 FL (ref 9.2–11.8)
POTASSIUM SERPL-SCNC: 5.4 MMOL/L (ref 3.5–5.5)
RBC # BLD AUTO: 2 M/UL (ref 4.7–5.5)
SODIUM SERPL-SCNC: 138 MMOL/L (ref 136–145)
WBC # BLD AUTO: 13.9 K/UL (ref 4.6–13.2)

## 2018-02-08 PROCEDURE — 74011000258 HC RX REV CODE- 258: Performed by: INTERNAL MEDICINE

## 2018-02-08 PROCEDURE — 77030011256 HC DRSG MEPILEX <16IN NO BORD MOLN -A

## 2018-02-08 PROCEDURE — 87070 CULTURE OTHR SPECIMN AEROBIC: CPT | Performed by: SURGERY

## 2018-02-08 PROCEDURE — 97167 OT EVAL HIGH COMPLEX 60 MIN: CPT

## 2018-02-08 PROCEDURE — 74011250636 HC RX REV CODE- 250/636: Performed by: HOSPITALIST

## 2018-02-08 PROCEDURE — P9016 RBC LEUKOCYTES REDUCED: HCPCS | Performed by: INTERNAL MEDICINE

## 2018-02-08 PROCEDURE — 76770 US EXAM ABDO BACK WALL COMP: CPT

## 2018-02-08 PROCEDURE — 80048 BASIC METABOLIC PNL TOTAL CA: CPT | Performed by: INTERNAL MEDICINE

## 2018-02-08 PROCEDURE — 36430 TRANSFUSION BLD/BLD COMPNT: CPT

## 2018-02-08 PROCEDURE — 74011250637 HC RX REV CODE- 250/637: Performed by: PHYSICIAN ASSISTANT

## 2018-02-08 PROCEDURE — 36415 COLL VENOUS BLD VENIPUNCTURE: CPT | Performed by: INTERNAL MEDICINE

## 2018-02-08 PROCEDURE — 74011250636 HC RX REV CODE- 250/636: Performed by: INTERNAL MEDICINE

## 2018-02-08 PROCEDURE — 74011250637 HC RX REV CODE- 250/637: Performed by: INTERNAL MEDICINE

## 2018-02-08 PROCEDURE — 85025 COMPLETE CBC W/AUTO DIFF WBC: CPT | Performed by: INTERNAL MEDICINE

## 2018-02-08 PROCEDURE — 65270000029 HC RM PRIVATE

## 2018-02-08 RX ORDER — SODIUM CHLORIDE 9 MG/ML
250 INJECTION, SOLUTION INTRAVENOUS AS NEEDED
Status: DISCONTINUED | OUTPATIENT
Start: 2018-02-08 | End: 2018-02-11 | Stop reason: HOSPADM

## 2018-02-08 RX ORDER — HYDROMORPHONE HYDROCHLORIDE 1 MG/ML
1 INJECTION, SOLUTION INTRAMUSCULAR; INTRAVENOUS; SUBCUTANEOUS
Status: DISCONTINUED | OUTPATIENT
Start: 2018-02-08 | End: 2018-02-11 | Stop reason: HOSPADM

## 2018-02-08 RX ORDER — SODIUM CHLORIDE 9 MG/ML
100 INJECTION, SOLUTION INTRAVENOUS CONTINUOUS
Status: DISPENSED | OUTPATIENT
Start: 2018-02-08 | End: 2018-02-09

## 2018-02-08 RX ADMIN — AMLODIPINE BESYLATE 10 MG: 5 TABLET ORAL at 10:39

## 2018-02-08 RX ADMIN — HYDRALAZINE HYDROCHLORIDE 25 MG: 25 TABLET, FILM COATED ORAL at 22:17

## 2018-02-08 RX ADMIN — SODIUM BICARBONATE 650 MG TABLET 650 MG: at 22:16

## 2018-02-08 RX ADMIN — SODIUM CHLORIDE 100 ML/HR: 900 INJECTION, SOLUTION INTRAVENOUS at 05:00

## 2018-02-08 RX ADMIN — HYDROMORPHONE HYDROCHLORIDE 1 MG: 1 INJECTION, SOLUTION INTRAMUSCULAR; INTRAVENOUS; SUBCUTANEOUS at 02:05

## 2018-02-08 RX ADMIN — Medication 100 MG: at 10:39

## 2018-02-08 RX ADMIN — SERTRALINE HYDROCHLORIDE 100 MG: 50 TABLET ORAL at 10:39

## 2018-02-08 RX ADMIN — HYDROMORPHONE HYDROCHLORIDE 1 MG: 1 INJECTION, SOLUTION INTRAMUSCULAR; INTRAVENOUS; SUBCUTANEOUS at 06:04

## 2018-02-08 RX ADMIN — SODIUM CHLORIDE 100 ML/HR: 900 INJECTION, SOLUTION INTRAVENOUS at 15:09

## 2018-02-08 RX ADMIN — HYDRALAZINE HYDROCHLORIDE 25 MG: 25 TABLET, FILM COATED ORAL at 10:40

## 2018-02-08 RX ADMIN — SODIUM CHLORIDE 2.25 G: 900 INJECTION, SOLUTION INTRAVENOUS at 22:17

## 2018-02-08 RX ADMIN — VITAM B12 100 MCG: 100 TAB at 10:40

## 2018-02-08 RX ADMIN — HYDRALAZINE HYDROCHLORIDE 25 MG: 25 TABLET, FILM COATED ORAL at 17:18

## 2018-02-08 RX ADMIN — SODIUM CHLORIDE 2.25 G: 900 INJECTION, SOLUTION INTRAVENOUS at 06:04

## 2018-02-08 RX ADMIN — HEPARIN SODIUM 5000 UNITS: 5000 INJECTION, SOLUTION INTRAVENOUS; SUBCUTANEOUS at 10:39

## 2018-02-08 RX ADMIN — HYDROMORPHONE HYDROCHLORIDE 1 MG: 1 INJECTION, SOLUTION INTRAMUSCULAR; INTRAVENOUS; SUBCUTANEOUS at 18:44

## 2018-02-08 RX ADMIN — Medication 10 ML: at 06:40

## 2018-02-08 RX ADMIN — FOLIC ACID 1 MG: 1 TABLET ORAL at 10:39

## 2018-02-08 RX ADMIN — SODIUM CHLORIDE 2.25 G: 900 INJECTION, SOLUTION INTRAVENOUS at 10:39

## 2018-02-08 RX ADMIN — HEPARIN SODIUM 5000 UNITS: 5000 INJECTION, SOLUTION INTRAVENOUS; SUBCUTANEOUS at 22:16

## 2018-02-08 RX ADMIN — ATORVASTATIN CALCIUM 10 MG: 10 TABLET, FILM COATED ORAL at 10:39

## 2018-02-08 RX ADMIN — HYDROMORPHONE HYDROCHLORIDE 1 MG: 1 INJECTION, SOLUTION INTRAMUSCULAR; INTRAVENOUS; SUBCUTANEOUS at 14:34

## 2018-02-08 RX ADMIN — HYDROMORPHONE HYDROCHLORIDE 1 MG: 1 INJECTION, SOLUTION INTRAMUSCULAR; INTRAVENOUS; SUBCUTANEOUS at 22:15

## 2018-02-08 RX ADMIN — LABETALOL HYDROCHLORIDE 200 MG: 200 TABLET, FILM COATED ORAL at 22:17

## 2018-02-08 RX ADMIN — HYDROMORPHONE HYDROCHLORIDE 1 MG: 1 INJECTION, SOLUTION INTRAMUSCULAR; INTRAVENOUS; SUBCUTANEOUS at 10:29

## 2018-02-08 RX ADMIN — SODIUM BICARBONATE 650 MG TABLET 650 MG: at 10:40

## 2018-02-08 RX ADMIN — HEPARIN SODIUM 5000 UNITS: 5000 INJECTION, SOLUTION INTRAVENOUS; SUBCUTANEOUS at 14:35

## 2018-02-08 RX ADMIN — LABETALOL HYDROCHLORIDE 200 MG: 200 TABLET, FILM COATED ORAL at 10:39

## 2018-02-08 RX ADMIN — TRAZODONE HYDROCHLORIDE 100 MG: 100 TABLET ORAL at 22:16

## 2018-02-08 RX ADMIN — Medication 10 ML: at 15:09

## 2018-02-08 RX ADMIN — SODIUM CHLORIDE 2.25 G: 900 INJECTION, SOLUTION INTRAVENOUS at 17:18

## 2018-02-08 NOTE — PROGRESS NOTES
1249: Assumed care. Pt resting in bed quietly watching TV after lunch. Pt continues to complain of abd pain and requesting for dilaudid before due time. Unit 2/2 started at this time. Will continue to monitor  1500: Transfusion completed. Well tolerated. Pain controlled. No concerns at this point.
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
## 2018-02-08 NOTE — ROUTINE PROCESS
Bedside and Verbal shift change report given to KATHRYN Cook RN (oncoming nurse) by EVERETTE Rosas RN (offgoing nurse). Report included the following information SBAR, Kardex, Intake/Output and MAR.

## 2018-02-08 NOTE — PROGRESS NOTES
Shift summary: Pt A&Ox4, up ad augusto, pain managed with IV Dilaudid. 0430: Dr. Melania Ye made aware of  fluid orders and current renal function labs. Received orders to continue IV fluids for anther 24 hours and to reassess. 0530: Critical hemoglobin of 5.8  0543: Dr. Melania Ye made aware. Received orders to transfuse two units of blood. 7195: Blood transfusion started at 75ml/hr. 1904: Pt tolerating blood transfusion. No signs of a reaction. VSS. Transfusion rate change to 125.

## 2018-02-08 NOTE — PROGRESS NOTES
Shift summary:  1839 Performed dressing change for pt. Bedside and Verbal shift change report given to Aida Mckay RN (oncoming nurse) by Sergio Dozier RN (offgoing nurse). Report included the following information SBAR, Kardex, ED Summary, Procedure Summary, Intake/Output, MAR, Accordion and Recent Results.

## 2018-02-08 NOTE — WOUND CARE
Wound care consulted to see pt for abdominal wound care. Pt noted to have an open area to left side of abdomen that measures 0.3 x 2.0 x 1.4. Wound cleansed with barrier wipe. Talked with MANSOOR Salgado and orders received  to loosely pack with 9 cm of 1/4 in iodoform packing and cover with gauze and mepilex border. Dressing secured with paper tape. Pt tolerated dressing application without difficulty and verbalizes understanding of wound care and dressing.

## 2018-02-08 NOTE — PROGRESS NOTES
Hospitalist Progress Note    Patient: Milla Kay MRN: [id number]  CSN: [id number]    YOB: 1960  Age: 65 y.o. Sex: male    DOA: 2/6/2018 LOS:  LOS: 2 days          Chief Complaint:    Abdominal Wall Abscess    Assessment/Plan     1. Abdominal Wall Abscess and Cellulitis  2. Chronic Hepatitis C  3. Acute on Chronic Kidney Disease  4. Sickle Cell Anemia  5. Heroin and Cocaine Abuse    1. Continue Vancomycin and Zosyn for now. Still complains of mild abdominal pain. No wound culture was sent yesterday, will request that sample be collected today. Blood cultures remain negative to date. Once wound culture is collected and resulted, will streamline abx. 3. Cr at 3.74 this AM. Renal ultrasound scheduled for today. Will continue to monitor, continue IVF. Baseline appears to be between 2.5-2.7. 4. Hgb 5.3 this AM. Currently receiving blood transfusion. Will reorder H/H this afternoon and monitor. 5. Patient in [facility] system, will need medical records prior to receiving any assistance with rehabilitative services. DVT Prophylaxis - Heparin  Dispo: Pending response to treatment, and progression of EDGAR. 1-2 days.      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 ([facility] F19.94    MDD (major depressive disorder) F32.9    Dehydration E86.0    Drug abuse F19.10    Narcotic overdose T40.601A    Hyponatremia E87.1    Pain of right hip joint M25.551    Bilateral pleural effusion J90    CKD (chronic kidney disease) stage 3, GFR 30-59 ml/min N18.3    Depression F32.9    Substance induced mood disorder (HCC) F19.94    Cellulitis L03.90    Leukocytosis D72.829    Cellulitis and abscess of trunk L03.319, L02.219    Acute kidney injury ([facility] N17.9       Subjective:    States he is having some shoulder and back pain. Mild abdominal pain as well. States they changed his dressing last night but didn't take specimen for culture. Review of systems:    Constitutional: denies fevers, chills, myalgias  Respiratory: denies SOB, cough  Cardiovascular: denies chest pain, palpitations  Gastrointestinal: denies nausea, vomiting, diarrhea      Vital signs/Intake and Output:  Visit Vitals    /77    Pulse 84    Temp 98.9 °F (37.2 °C)    Resp 16    Ht 5' 4\" (1.626 m)    Wt 62.6 kg (138 lb 1.6 oz)    SpO2 100%    BMI 23.7 kg/m2     Current Shift:     Last three shifts:  02/06 1901 - 02/08 0700  In: 3087 [P.O.:1560; I.V.:2591]  Out: 2950 [Urine:1750]    Exam:    General: Well developed, alert, NAD, OX3  Head/Neck: NCAT, supple, No masses, No lymphadenopathy  CVS:Regular rate and rhythm, no M/R/G, S1/S2 heard, no thrill  Lungs:Clear to auscultation bilaterally, no wheezes, rhonchi, or rales  Abdomen: Soft, RLQ tenderness of I&D area, No distention, Normal Bowel sounds, No hepatomegaly. 4x4 gauze over I&D site. Midline surgical scar.   Extremities: No C/C/E, pulses palpable 2+  Skin:normal texture and turgor, no rashes, no lesions  Neuro:grossly normal , follows commands  Psych:appropriate                Labs: Results:       Chemistry Recent Labs      02/08/18   0349  02/07/18   0007  02/06/18   1625   GLU  130*  112*  108*   NA  138  140  141   K  5.4  5.4  5.2   CL  109*  111*  110*   CO2  19*  20*  18*   BUN  38*  43*  47*   CREA  3.74*  3.71*  4.34*   CA  8.0*  7.8*  8.0*   AGAP  10  9  13   BUCR  10*  12  11*   AP   --    --   71   TP   --    --   7.1   ALB   --    --   3.2*   GLOB   --    --   3.9   AGRAT   --    --   0.8      CBC w/Diff Recent Labs      02/08/18   0349  02/06/18   1625   WBC  13.9*  18.3*   RBC  2.00*  2.13*   HGB 5. 8*  6.3*   HCT  17.1*  18.1*   PLT  156  202   GRANS  70  86*   LYMPH  20*  8*   EOS  4  1      Cardiac Enzymes Recent Labs      02/06/18   1625   CPK  41   CKND1  3.2      Coagulation No results for input(s): PTP, INR, APTT in the last 72 hours. No lab exists for component: INREXT    Lipid Panel No results found for: CHOL, CHOLPOCT, CHOLX, CHLST, CHOLV, 973226, HDL, LDL, LDLC, DLDLP, 708188, VLDLC, VLDL, TGLX, TRIGL, TRIGP, TGLPOCT, CHHD, CHHDX   BNP No results for input(s): BNPP in the last 72 hours.    Liver Enzymes Recent Labs      02/06/18   1625   TP  7.1   ALB  3.2*   AP  71   SGOT  25      Thyroid Studies Lab Results   Component Value Date/Time    TSH 1.32 11/29/2017 05:39 AM        Procedures/imaging: see electronic medical records for all procedures/Xrays and details which were not copied into this note but were reviewed prior to creation of [address] Bobby Hurley, PA-C

## 2018-02-09 LAB
ABO + RH BLD: NORMAL
ANION GAP SERPL CALC-SCNC: 9 MMOL/L (ref 3–18)
ANTIGENS PRESENT RBC DONR: NORMAL
BASOPHILS # BLD: 0.1 K/UL (ref 0–0.06)
BASOPHILS NFR BLD: 0 % (ref 0–2)
BLD PROD TYP BPU: NORMAL
BLOOD BANK CMNT PATIENT-IMP: NORMAL
BLOOD GROUP ANTIBODIES SERPL: NORMAL
BPU ID: NORMAL
BUN SERPL-MCNC: 37 MG/DL (ref 7–18)
BUN/CREAT SERPL: 10 (ref 12–20)
CALCIUM SERPL-MCNC: 7.9 MG/DL (ref 8.5–10.1)
CALLED TO:,BCALL1: NORMAL
CHLORIDE SERPL-SCNC: 111 MMOL/L (ref 100–108)
CO2 SERPL-SCNC: 20 MMOL/L (ref 21–32)
CREAT SERPL-MCNC: 3.58 MG/DL (ref 0.6–1.3)
CROSSMATCH RESULT,%XM: NORMAL
DIFFERENTIAL METHOD BLD: ABNORMAL
EOSINOPHIL # BLD: 0.5 K/UL (ref 0–0.4)
EOSINOPHIL NFR BLD: 4 % (ref 0–5)
ERYTHROCYTE [DISTWIDTH] IN BLOOD BY AUTOMATED COUNT: 16.5 % (ref 11.6–14.5)
GLUCOSE SERPL-MCNC: 90 MG/DL (ref 74–99)
HCT VFR BLD AUTO: 23.1 % (ref 36–48)
HGB BLD-MCNC: 7.9 G/DL (ref 13–16)
LYMPHOCYTES # BLD: 1.9 K/UL (ref 0.9–3.6)
LYMPHOCYTES NFR BLD: 15 % (ref 21–52)
MCH RBC QN AUTO: 29.3 PG (ref 24–34)
MCHC RBC AUTO-ENTMCNC: 34.2 G/DL (ref 31–37)
MCV RBC AUTO: 85.6 FL (ref 74–97)
MONOCYTES # BLD: 0.8 K/UL (ref 0.05–1.2)
MONOCYTES NFR BLD: 6 % (ref 3–10)
NEUTS SEG # BLD: 9.5 K/UL (ref 1.8–8)
NEUTS SEG NFR BLD: 75 % (ref 40–73)
PHYSICIAN INSTRUCTIO,%PI: NORMAL
PLATELET # BLD AUTO: 143 K/UL (ref 135–420)
PMV BLD AUTO: 11.2 FL (ref 9.2–11.8)
POTASSIUM SERPL-SCNC: 5.9 MMOL/L (ref 3.5–5.5)
RBC # BLD AUTO: 2.7 M/UL (ref 4.7–5.5)
SODIUM SERPL-SCNC: 140 MMOL/L (ref 136–145)
SPECIMEN EXP DATE BLD: NORMAL
STATUS OF UNIT,%ST: NORMAL
UNIT DIVISION, %UDIV: 0
VANCOMYCIN SERPL-MCNC: 14.5 UG/ML (ref 5–40)
WBC # BLD AUTO: 12.8 K/UL (ref 4.6–13.2)

## 2018-02-09 PROCEDURE — 74011250637 HC RX REV CODE- 250/637: Performed by: INTERNAL MEDICINE

## 2018-02-09 PROCEDURE — 36415 COLL VENOUS BLD VENIPUNCTURE: CPT | Performed by: INTERNAL MEDICINE

## 2018-02-09 PROCEDURE — 74011250636 HC RX REV CODE- 250/636: Performed by: INTERNAL MEDICINE

## 2018-02-09 PROCEDURE — 85025 COMPLETE CBC W/AUTO DIFF WBC: CPT | Performed by: INTERNAL MEDICINE

## 2018-02-09 PROCEDURE — 74011250637 HC RX REV CODE- 250/637: Performed by: HOSPITALIST

## 2018-02-09 PROCEDURE — 80048 BASIC METABOLIC PNL TOTAL CA: CPT | Performed by: INTERNAL MEDICINE

## 2018-02-09 PROCEDURE — 74011250636 HC RX REV CODE- 250/636: Performed by: HOSPITALIST

## 2018-02-09 PROCEDURE — 74011000250 HC RX REV CODE- 250: Performed by: HOSPITALIST

## 2018-02-09 PROCEDURE — 80202 ASSAY OF VANCOMYCIN: CPT | Performed by: INTERNAL MEDICINE

## 2018-02-09 PROCEDURE — 74011250637 HC RX REV CODE- 250/637: Performed by: PHYSICIAN ASSISTANT

## 2018-02-09 PROCEDURE — 65270000029 HC RM PRIVATE

## 2018-02-09 PROCEDURE — 74011000258 HC RX REV CODE- 258: Performed by: INTERNAL MEDICINE

## 2018-02-09 RX ORDER — SODIUM POLYSTYRENE SULFONATE 15 G/60ML
15 SUSPENSION ORAL; RECTAL
Status: COMPLETED | OUTPATIENT
Start: 2018-02-09 | End: 2018-02-09

## 2018-02-09 RX ORDER — OXYCODONE AND ACETAMINOPHEN 5; 325 MG/1; MG/1
1 TABLET ORAL
Status: DISCONTINUED | OUTPATIENT
Start: 2018-02-09 | End: 2018-02-11 | Stop reason: HOSPADM

## 2018-02-09 RX ORDER — SODIUM CHLORIDE 9 MG/ML
100 INJECTION, SOLUTION INTRAVENOUS CONTINUOUS
Status: DISCONTINUED | OUTPATIENT
Start: 2018-02-09 | End: 2018-02-11 | Stop reason: HOSPADM

## 2018-02-09 RX ADMIN — FOLIC ACID 1 MG: 1 TABLET ORAL at 09:08

## 2018-02-09 RX ADMIN — SODIUM CHLORIDE 100 ML/HR: 900 INJECTION, SOLUTION INTRAVENOUS at 03:52

## 2018-02-09 RX ADMIN — SODIUM CHLORIDE 100 ML/HR: 900 INJECTION, SOLUTION INTRAVENOUS at 13:11

## 2018-02-09 RX ADMIN — OXYCODONE HYDROCHLORIDE AND ACETAMINOPHEN 1 TABLET: 5; 325 TABLET ORAL at 13:11

## 2018-02-09 RX ADMIN — Medication 100 MG: at 09:08

## 2018-02-09 RX ADMIN — LABETALOL HYDROCHLORIDE 200 MG: 200 TABLET, FILM COATED ORAL at 09:08

## 2018-02-09 RX ADMIN — HYDROMORPHONE HYDROCHLORIDE 1 MG: 1 INJECTION, SOLUTION INTRAMUSCULAR; INTRAVENOUS; SUBCUTANEOUS at 22:09

## 2018-02-09 RX ADMIN — HYDROMORPHONE HYDROCHLORIDE 1 MG: 1 INJECTION, SOLUTION INTRAMUSCULAR; INTRAVENOUS; SUBCUTANEOUS at 09:08

## 2018-02-09 RX ADMIN — HEPARIN SODIUM 5000 UNITS: 5000 INJECTION, SOLUTION INTRAVENOUS; SUBCUTANEOUS at 14:11

## 2018-02-09 RX ADMIN — SODIUM BICARBONATE 650 MG TABLET 650 MG: at 09:08

## 2018-02-09 RX ADMIN — SODIUM BICARBONATE 650 MG TABLET 650 MG: at 21:03

## 2018-02-09 RX ADMIN — HEPARIN SODIUM 5000 UNITS: 5000 INJECTION, SOLUTION INTRAVENOUS; SUBCUTANEOUS at 06:23

## 2018-02-09 RX ADMIN — HYDROMORPHONE HYDROCHLORIDE 1 MG: 1 INJECTION, SOLUTION INTRAMUSCULAR; INTRAVENOUS; SUBCUTANEOUS at 14:12

## 2018-02-09 RX ADMIN — SODIUM POLYSTYRENE SULFONATE 15 G: 15 SUSPENSION ORAL; RECTAL at 09:06

## 2018-02-09 RX ADMIN — LABETALOL HYDROCHLORIDE 200 MG: 200 TABLET, FILM COATED ORAL at 21:03

## 2018-02-09 RX ADMIN — VITAM B12 100 MCG: 100 TAB at 09:08

## 2018-02-09 RX ADMIN — AMLODIPINE BESYLATE 10 MG: 5 TABLET ORAL at 09:08

## 2018-02-09 RX ADMIN — SERTRALINE HYDROCHLORIDE 100 MG: 50 TABLET ORAL at 09:08

## 2018-02-09 RX ADMIN — HYDRALAZINE HYDROCHLORIDE 25 MG: 25 TABLET, FILM COATED ORAL at 09:08

## 2018-02-09 RX ADMIN — ATORVASTATIN CALCIUM 10 MG: 10 TABLET, FILM COATED ORAL at 09:08

## 2018-02-09 RX ADMIN — TRAZODONE HYDROCHLORIDE 100 MG: 100 TABLET ORAL at 21:03

## 2018-02-09 RX ADMIN — HEPARIN SODIUM 5000 UNITS: 5000 INJECTION, SOLUTION INTRAVENOUS; SUBCUTANEOUS at 22:09

## 2018-02-09 RX ADMIN — HYDROMORPHONE HYDROCHLORIDE 1 MG: 1 INJECTION, SOLUTION INTRAMUSCULAR; INTRAVENOUS; SUBCUTANEOUS at 03:51

## 2018-02-09 RX ADMIN — WATER 1 G: 1 INJECTION INTRAMUSCULAR; INTRAVENOUS; SUBCUTANEOUS at 13:10

## 2018-02-09 RX ADMIN — HYDRALAZINE HYDROCHLORIDE 25 MG: 25 TABLET, FILM COATED ORAL at 16:44

## 2018-02-09 RX ADMIN — SODIUM CHLORIDE 2.25 G: 900 INJECTION, SOLUTION INTRAVENOUS at 03:52

## 2018-02-09 RX ADMIN — Medication 10 ML: at 22:12

## 2018-02-09 RX ADMIN — HYDRALAZINE HYDROCHLORIDE 25 MG: 25 TABLET, FILM COATED ORAL at 21:03

## 2018-02-09 RX ADMIN — SODIUM CHLORIDE 2.25 G: 900 INJECTION, SOLUTION INTRAVENOUS at 11:40

## 2018-02-09 RX ADMIN — Medication 10 ML: at 18:05

## 2018-02-09 RX ADMIN — HYDROMORPHONE HYDROCHLORIDE 1 MG: 1 INJECTION, SOLUTION INTRAMUSCULAR; INTRAVENOUS; SUBCUTANEOUS at 18:05

## 2018-02-09 NOTE — PROGRESS NOTES
SHIFT SUMMARY:  Pt has been stable day shift. Abdominal wound drsg CDI. Pt Continues to request PRN pain med round the clock pain remained at 9/10. Bedside and Verbal shift change report given to YOAN Espinoza RN. Report included the following information SBAR, Kardex, Intake/Output, MAR, Recent Results and Cardiac Rhythm NSR.    Patient Vitals for the past 12 hrs:   Temp Pulse Resp BP SpO2   02/08/18 1820 98.3 °F (36.8 °C) 77 - 136/73 98 %   02/08/18 1507 98.4 °F (36.9 °C) 77 - 124/68 -   02/08/18 1400 98.2 °F (36.8 °C) 82 - 136/75 -   02/08/18 1330 98.2 °F (36.8 °C) 78 - 133/76 -   02/08/18 1315 98.2 °F (36.8 °C) 80 - 128/71 -   02/08/18 1301 98.2 °F (36.8 °C) 78 16 132/71 100 %   02/08/18 1249 98.3 °F (36.8 °C) 78 16 137/83 100 %   02/08/18 0948 98.9 °F (37.2 °C) 84 16 154/77 100 %   02/08/18 0850 98.7 °F (37.1 °C) 79 16 147/77 99 %   02/08/18 0803 98.1 °F (36.7 °C) 80 14 156/80 -   02/08/18 0721 98.3 °F (36.8 °C) 75 14 134/69 100 %

## 2018-02-09 NOTE — PROGRESS NOTES
Noted plan discharge with in the next 48 hours. Pt will have informationmove for [facility] and [facility] medial records to request medial records to assist with inpt rehab as previously requested from [facility] to move forward with drug rehab. Noted surgery indicating pt will need follow up at the wound care clinic. Pt is scheduled for an appointment at the wound care clinic on Monday 2/12/18. This information will be on pt's AVS to assist with transition home. Pt has indicated he will require a medicaid taxi home when medically stable.   CM remains available to assist.

## 2018-02-09 NOTE — PROGRESS NOTES
Hospitalist Progress Note    Patient: Lars Mcburney MRN: [id number]  CSN: [id number]    YOB: 1960  Age: 65 y.o. Sex: male    DOA: 2/6/2018 LOS:  LOS: 3 days          Chief Complaint:    Abdominal Wall Abscess    Assessment/Plan     1. Abdominal Wall Cellulitis  2. Chronic Hepatitis C   3. Acute on Chronic Kidney Disease  4. Sickle Cell Anemia  5. Heroin and Cocaine Abuse    1. Continue Vancomycin and Zosyn. No further complaints of abdominal pain after wound care assessment and dressing change. Wound culture with no organism seen, but culture result is pending. Blood cultures remain negative to date. Will streamline abx once culture result returns. 3. Cr improving, 3.58 today. No hydronephrosis on renal ultrasound. Will monitor, continue IVF. Once abx can be streamlined, may decrease burden of abx on the kidneys. 4. After blood transfusions, Hgb 7.9 this AM. Will monitor with daily CBC. 5. Patient in [facility] system. Will need medical records prior to receiving assistance with rehab. Patient had elevated potassium this AM at 5.9, kayexalate ordered and given. Will monitor with daily BMP. DVT Prophylaxis - Heparin  Dispo: Will need final culture results. Monitor progression of EDGAR. Anticipate 2-3 more days. Patient Active Problem List   Diagnosis Code    Sickle cell anemia (HCC) D57.1    ETOH abuse F10.10    Chronic hepatitis C ([facility] [address] B18.2    MDD (major depressive disorder) F32.9    Drug abuse F19.10    CKD (chronic kidney disease) stage 3, GFR 30-59 ml/min N18.3    Substance induced mood disorder (HCC) F19.94    Leukocytosis D72.829    Cellulitis and abscess of trunk L03.319, L02.219    Acute renal failure superimposed on chronic kidney disease (HCC) N17.9, N18.9       Subjective:    States overall he feels better, still complaining of left shoulder pain.     Review of systems:    Constitutional: denies fevers, chills, myalgias  Respiratory: denies SOB, cough  Cardiovascular: denies chest pain, palpitations  Gastrointestinal: denies nausea, vomiting, diarrhea      Vital signs/Intake and Output:  Visit Vitals    /82 (BP 1 Location: Left arm, BP Patient Position: At rest)    Pulse 76    Temp 98.2 °F (36.8 °C)    Resp 16    Ht 5' 4\" (1.626 m)    Wt 62.6 kg (138 lb 1.6 oz)    SpO2 98%    BMI 23.7 kg/m2     Current Shift:     Last three shifts:  02/07 1901 - 02/09 0700  In: 5093.4 [P.O.:360; I.V.:4059.3]  Out: 325 [Urine:325]    Exam:    General: Well developed, alert, NAD, OX3  Head/Neck: NCAT, supple, No masses, No lymphadenopathy  CVS:Regular rate and rhythm, no M/R/G, S1/S2 heard, no thrill  Lungs:Clear to auscultation bilaterally, no wheezes, rhonchi, or rales  Abdomen: Soft, Nontender, No distention, Normal Bowel sounds, No hepatomegaly. Midline scar.  Dressing applied to LLQ   Extremities: No C/C/E, pulses palpable 2+  Skin:normal texture and turgor, no rashes, no lesions  Neuro:grossly normal , follows commands  Psych:appropriate                Labs: Results:       Chemistry Recent Labs      02/09/18 0319 02/08/18 0349 02/07/18   0007  02/06/18   1625   GLU  90  130*  112*  108*   NA  140  138  140  141   K  5.9*  5.4  5.4  5.2   CL  111*  109*  111*  110*   CO2  20*  19*  20*  18*   BUN  37*  38*  43*  47*   CREA  3.58*  3.74*  3.71*  4.34*   CA  7.9*  8.0*  7.8*  8.0*   AGAP  9  10  9  13   BUCR  10*  10*  12  11*   AP   --    --    --   71   TP   --    --    --   7.1   ALB   --    --    --   3.2*   GLOB   --    --    --   3.9   AGRAT   --    --    --   0.8      CBC w/Diff Recent Labs      02/09/18 0319 02/08/18   0349 02/06/18   1625   WBC  12.8  13.9*  18.3*   RBC  2.70*  2.00*  2.13*   HGB  7.9*  5.8*  6.3*   HCT  23.1*  17.1*  18.1*   PLT  143  156  202   GRANS  75*  70  86*   LYMPH  15*  20*  8*   EOS  4  4  1      Cardiac Enzymes Recent Labs      02/06/18   1625   CPK  41   CKND1  3.2      Coagulation No results for input(s): PTP, INR, APTT in the last 72 hours. No lab exists for component: INREXT    Lipid Panel No results found for: CHOL, CHOLPOCT, CHOLX, CHLST, CHOLV, 579279, HDL, LDL, LDLC, DLDLP, 098823, VLDLC, VLDL, TGLX, TRIGL, TRIGP, TGLPOCT, CHHD, CHHDX   BNP No results for input(s): BNPP in the last 72 hours.    Liver Enzymes Recent Labs      02/06/18   1625   TP  7.1   ALB  3.2*   AP  71   SGOT  25      Thyroid Studies Lab Results   Component Value Date/Time    TSH 1.32 11/29/2017 05:39 AM        Procedures/imaging: see electronic medical records for all procedures/Xrays and details which were not copied into this note but were reviewed prior to creation of 6[address] Bobby Hurley, PA-C

## 2018-02-09 NOTE — ROUTINE PROCESS
Bedside and Verbal shift change report given to Parminder Islas RN (oncoming nurse) by Arielle Yoon RN (offgoing nurse). Report included the following information SBAR, Kardex, ED Summary, Procedure Summary, Intake/Output, MAR, Recent Results, Med Rec Status and Cardiac Rhythm NSR.

## 2018-02-09 NOTE — PROGRESS NOTES
Vancomycin discontinued by Dr. Lucinda Ryan 2/9/18. Vancomycin dosing consult discontinued. Nereida Pena, PharmD     [phone]  __________________________________________________________________________________________    Pharmacy Dosing Services: Vancomycin    Consult for Vancomycin Dosing by Pharmacy by Dr. Kalie Zhu provided for this 65y.o. year old male , for indication of Skin and soft tissue infection   Day of Therapy 04    Scr=3.58    CrCl=19.3 ml/min    Vancomycin Random Level: 14.5 (2/9/18 @ 0319)    Vancomycin 1000 mg IV once, ordered for today (2/9/18 @ 1100)  Goal trough ~15    Subsequent dosing to be determined based on renal function and levels    Ht Readings from Last 1 Encounters:   02/06/18 162.6 cm (64\")        Wt Readings from Last 1 Encounters:   02/07/18 62.6 kg (138 lb 1.6 oz)        Other Current Antibiotics Zosyn   Significant Cultures Pending   Serum Creatinine Lab Results   Component Value Date/Time    Creatinine 3.58 (H) 02/09/2018 03:19 AM      Creatinine Clearance Estimated Creatinine Clearance: 19.3 mL/min (based on Cr of 3.58).    BUN Lab Results   Component Value Date/Time    BUN 37 (H) 02/09/2018 03:19 AM      WBC Lab Results   Component Value Date/Time    WBC 12.8 02/09/2018 03:19 AM      H/H Lab Results   Component Value Date/Time    HGB 7.9 (L) 02/09/2018 03:19 AM      Platelets Lab Results   Component Value Date/Time    PLATELET 057 81/36/6009 03:19 AM      Temp 98.2 °F (36.8 °C)     Pharmacy to follow daily and will make changes to dose and/or frequency based on clinical status    Pharmacist Caleb Oleary, CHINOD

## 2018-02-10 LAB
ANION GAP SERPL CALC-SCNC: 11 MMOL/L (ref 3–18)
BASOPHILS # BLD: 0 K/UL (ref 0–0.06)
BASOPHILS NFR BLD: 0 % (ref 0–2)
BUN SERPL-MCNC: 34 MG/DL (ref 7–18)
BUN/CREAT SERPL: 10 (ref 12–20)
CALCIUM SERPL-MCNC: 7.9 MG/DL (ref 8.5–10.1)
CHLORIDE SERPL-SCNC: 110 MMOL/L (ref 100–108)
CO2 SERPL-SCNC: 19 MMOL/L (ref 21–32)
CREAT SERPL-MCNC: 3.57 MG/DL (ref 0.6–1.3)
DIFFERENTIAL METHOD BLD: ABNORMAL
EOSINOPHIL # BLD: 0.5 K/UL (ref 0–0.4)
EOSINOPHIL NFR BLD: 4 % (ref 0–5)
ERYTHROCYTE [DISTWIDTH] IN BLOOD BY AUTOMATED COUNT: 17.1 % (ref 11.6–14.5)
GLUCOSE SERPL-MCNC: 124 MG/DL (ref 74–99)
HCT VFR BLD AUTO: 23 % (ref 36–48)
HGB BLD-MCNC: 7.6 G/DL (ref 13–16)
LYMPHOCYTES # BLD: 1.6 K/UL (ref 0.9–3.6)
LYMPHOCYTES NFR BLD: 13 % (ref 21–52)
MCH RBC QN AUTO: 28.8 PG (ref 24–34)
MCHC RBC AUTO-ENTMCNC: 33 G/DL (ref 31–37)
MCV RBC AUTO: 87.1 FL (ref 74–97)
MONOCYTES # BLD: 0.9 K/UL (ref 0.05–1.2)
MONOCYTES NFR BLD: 8 % (ref 3–10)
NEUTS SEG # BLD: 8.8 K/UL (ref 1.8–8)
NEUTS SEG NFR BLD: 75 % (ref 40–73)
PLATELET # BLD AUTO: 142 K/UL (ref 135–420)
PMV BLD AUTO: 10.5 FL (ref 9.2–11.8)
POTASSIUM SERPL-SCNC: 5.5 MMOL/L (ref 3.5–5.5)
RBC # BLD AUTO: 2.64 M/UL (ref 4.7–5.5)
SODIUM SERPL-SCNC: 140 MMOL/L (ref 136–145)
WBC # BLD AUTO: 11.8 K/UL (ref 4.6–13.2)

## 2018-02-10 PROCEDURE — 65270000029 HC RM PRIVATE

## 2018-02-10 PROCEDURE — 36415 COLL VENOUS BLD VENIPUNCTURE: CPT | Performed by: INTERNAL MEDICINE

## 2018-02-10 PROCEDURE — 74011250637 HC RX REV CODE- 250/637: Performed by: HOSPITALIST

## 2018-02-10 PROCEDURE — 74011000250 HC RX REV CODE- 250: Performed by: HOSPITALIST

## 2018-02-10 PROCEDURE — 74011250637 HC RX REV CODE- 250/637: Performed by: INTERNAL MEDICINE

## 2018-02-10 PROCEDURE — 74011250636 HC RX REV CODE- 250/636: Performed by: HOSPITALIST

## 2018-02-10 PROCEDURE — 74011250636 HC RX REV CODE- 250/636: Performed by: INTERNAL MEDICINE

## 2018-02-10 PROCEDURE — 97161 PT EVAL LOW COMPLEX 20 MIN: CPT

## 2018-02-10 PROCEDURE — 77030011256 HC DRSG MEPILEX <16IN NO BORD MOLN -A

## 2018-02-10 PROCEDURE — 74011250637 HC RX REV CODE- 250/637: Performed by: PHYSICIAN ASSISTANT

## 2018-02-10 PROCEDURE — 85025 COMPLETE CBC W/AUTO DIFF WBC: CPT | Performed by: INTERNAL MEDICINE

## 2018-02-10 PROCEDURE — 80048 BASIC METABOLIC PNL TOTAL CA: CPT | Performed by: INTERNAL MEDICINE

## 2018-02-10 RX ADMIN — HYDROMORPHONE HYDROCHLORIDE 1 MG: 1 INJECTION, SOLUTION INTRAMUSCULAR; INTRAVENOUS; SUBCUTANEOUS at 15:58

## 2018-02-10 RX ADMIN — SODIUM BICARBONATE 650 MG TABLET 650 MG: at 08:10

## 2018-02-10 RX ADMIN — LABETALOL HYDROCHLORIDE 200 MG: 200 TABLET, FILM COATED ORAL at 20:01

## 2018-02-10 RX ADMIN — HYDRALAZINE HYDROCHLORIDE 25 MG: 25 TABLET, FILM COATED ORAL at 22:22

## 2018-02-10 RX ADMIN — HYDROMORPHONE HYDROCHLORIDE 1 MG: 1 INJECTION, SOLUTION INTRAMUSCULAR; INTRAVENOUS; SUBCUTANEOUS at 12:02

## 2018-02-10 RX ADMIN — HYDROMORPHONE HYDROCHLORIDE 1 MG: 1 INJECTION, SOLUTION INTRAMUSCULAR; INTRAVENOUS; SUBCUTANEOUS at 08:09

## 2018-02-10 RX ADMIN — HYDRALAZINE HYDROCHLORIDE 25 MG: 25 TABLET, FILM COATED ORAL at 15:15

## 2018-02-10 RX ADMIN — TRAZODONE HYDROCHLORIDE 100 MG: 100 TABLET ORAL at 22:22

## 2018-02-10 RX ADMIN — HEPARIN SODIUM 5000 UNITS: 5000 INJECTION, SOLUTION INTRAVENOUS; SUBCUTANEOUS at 22:21

## 2018-02-10 RX ADMIN — HYDROMORPHONE HYDROCHLORIDE 1 MG: 1 INJECTION, SOLUTION INTRAMUSCULAR; INTRAVENOUS; SUBCUTANEOUS at 20:01

## 2018-02-10 RX ADMIN — WATER 1 G: 1 INJECTION INTRAMUSCULAR; INTRAVENOUS; SUBCUTANEOUS at 12:03

## 2018-02-10 RX ADMIN — OXYCODONE HYDROCHLORIDE AND ACETAMINOPHEN 1 TABLET: 5; 325 TABLET ORAL at 18:09

## 2018-02-10 RX ADMIN — FOLIC ACID 1 MG: 1 TABLET ORAL at 08:10

## 2018-02-10 RX ADMIN — SODIUM BICARBONATE 650 MG TABLET 650 MG: at 20:01

## 2018-02-10 RX ADMIN — HEPARIN SODIUM 5000 UNITS: 5000 INJECTION, SOLUTION INTRAVENOUS; SUBCUTANEOUS at 15:15

## 2018-02-10 RX ADMIN — HYDROMORPHONE HYDROCHLORIDE 1 MG: 1 INJECTION, SOLUTION INTRAMUSCULAR; INTRAVENOUS; SUBCUTANEOUS at 03:55

## 2018-02-10 RX ADMIN — ATORVASTATIN CALCIUM 10 MG: 10 TABLET, FILM COATED ORAL at 08:11

## 2018-02-10 RX ADMIN — Medication 100 MG: at 08:10

## 2018-02-10 RX ADMIN — Medication 10 ML: at 05:57

## 2018-02-10 RX ADMIN — OXYCODONE HYDROCHLORIDE AND ACETAMINOPHEN 1 TABLET: 5; 325 TABLET ORAL at 22:22

## 2018-02-10 RX ADMIN — LABETALOL HYDROCHLORIDE 200 MG: 200 TABLET, FILM COATED ORAL at 08:10

## 2018-02-10 RX ADMIN — HYDRALAZINE HYDROCHLORIDE 25 MG: 25 TABLET, FILM COATED ORAL at 08:11

## 2018-02-10 RX ADMIN — OXYCODONE HYDROCHLORIDE AND ACETAMINOPHEN 1 TABLET: 5; 325 TABLET ORAL at 10:49

## 2018-02-10 RX ADMIN — SODIUM CHLORIDE 100 ML/HR: 900 INJECTION, SOLUTION INTRAVENOUS at 05:58

## 2018-02-10 RX ADMIN — VITAM B12 100 MCG: 100 TAB at 08:11

## 2018-02-10 RX ADMIN — HEPARIN SODIUM 5000 UNITS: 5000 INJECTION, SOLUTION INTRAVENOUS; SUBCUTANEOUS at 06:00

## 2018-02-10 RX ADMIN — AMLODIPINE BESYLATE 10 MG: 5 TABLET ORAL at 08:10

## 2018-02-10 RX ADMIN — SERTRALINE HYDROCHLORIDE 100 MG: 50 TABLET ORAL at 08:11

## 2018-02-10 NOTE — ROUTINE PROCESS
Bedside and Verbal shift change report given to Wilder Holden RN (oncoming nurse) by Kimmie Quinonez RN  (offgoing nurse). Report included the following information SBAR, Kardex, Intake/Output and MAR.

## 2018-02-10 NOTE — ROUTINE PROCESS
Bedside and Verbal shift change report given to Hiram Voss RN (oncoming nurse) by Emely Feng   (offgoing nurse). Report included the following information SBAR, Intake/Output and MAR.

## 2018-02-10 NOTE — PROGRESS NOTES
Clarification of information for patient discharge:  When medically stable, patient may be discharged home. Per Rodrigues Screen, patient has been provided the phone numbers for him to obtain his medical records from [facility] and [facility]; this information is also in the AVS which will be provided to patient at discharge. Patient is responsible to call and obtain his medical records in order to facilitate his admission into an inpatient drug rehab program; patient has already contacted this program but needs to provide his medical records. Per previous CM note, patient has an appointment at the [facility] wound care clinic on Monday.

## 2018-02-10 NOTE — PROGRESS NOTES
Problem: Falls - Risk of  Goal: *Absence of Falls  Document Rima Fall Risk and appropriate interventions in the flowsheet.    Outcome: Progressing Towards Goal  Fall Risk Interventions:            Medication Interventions: Teach patient to arise slowly         History of Falls Interventions: Consult care management for discharge planning

## 2018-02-10 NOTE — PROGRESS NOTES
Hospitalist Progress Note-critical care note     Patient: Amaury Talbot MRN: [id number]  CSN: [id number]    YOB: 1960  Age: 65 y.o. Sex: male    DOA: 2/6/2018 LOS:  LOS: 4 days            Chief complaint: cellulitis and abscess, ladonna on ckd     Assessment/Plan         Hospital Problems  Date Reviewed: 2/6/2018          Codes Class Noted POA    Leukocytosis ICD-10-CM: D72.829  ICD-9-CM: 288.60  2/6/2018 Yes        * (Principal)Cellulitis and abscess of trunk ICD-10-CM: L03.319, L02.219  ICD-9-CM: 682.2  2/6/2018 Yes        Acute renal failure superimposed on chronic kidney disease ([facility] ICD-10-CM: N17.9, N18.9  ICD-9-CM: 584.9, 585.9  2/6/2018 Yes        Substance induced mood disorder ([facility] ICD-10-CM: A91.12  ICD-9-CM: 292.84  11/29/2017 Yes        CKD (chronic kidney disease) stage 3, GFR 30-59 ml/min ICD-10-CM: N18.3  ICD-9-CM: 585.3  3/21/2017 Yes        Sickle cell anemia (HCC) (Chronic) ICD-10-CM: D57.1  ICD-9-CM: 282.60  11/4/2014 Yes        Chronic hepatitis C (HCC) (Chronic) ICD-10-CM: B18.2  ICD-9-CM: 070.54  11/4/2014 Yes              1. Abdominal Wall Cellulitis and abscess   I&D done in ed  Resolving, wound care   bcx and wound cx negative   On rocephine now     2. Chronic Hepatitis C   Need f/u with hepatology for treatment if he is the candidate     3. Acute on Chronic Kidney Disease   Cr stable now   4. Sickle Cell Anemia  Received transfusion   5. Heroin and Cocaine Abuse  Continue education     Ready to be d/c from medical  Clinic point, ? need medical record  previously requested from [facility] to move forward with drug rehab. Will have PT on board today  Disposition :tomorrow after pt evaluation     Subjective: pain in shoulder and  Back   Review of systems:    General: No fevers or chills. Cardiovascular: No chest pain or pressure. No palpitations. Pulmonary: No shortness of breath. Gastrointestinal: No nausea, vomiting.      Vital signs/Intake and Output:  Visit Vitals    BP 149/73    Pulse 83    Temp 98.1 °F (36.7 °C)    Resp 18    Ht 5' 4\" (1.626 m)    Wt 59.2 kg (130 lb 8.2 oz)    SpO2 97%    BMI 22.4 kg/m2     Current Shift:  02/10 0701 - 02/10 1900  In: -   Out: 500 [Urine:500]  Last three shifts:  02/08 1901 - 02/10 0700  In: 0523 [P.O.:1040; I.V.:3866]  Out: 350 [Urine:350]    Physical Exam:  General: WD, WN. Alert, cooperative, no acute distress    HEENT: NC, Atraumatic. PERRLA, anicteric sclerae. Lungs: CTA Bilaterally. No Wheezing/Rhonchi/Rales. Heart:  Regular  rhythm,  +murmur, No Rubs, No Gallops  Abdomen: Soft, Non distended, Non tender.  +Bowel sounds, mild erythema of abdomen, no discharge   Extremities: No c/c/e  Psych:   Not anxious or agitated. Neurologic:  No acute neurological deficit. Labs: Results:       Chemistry Recent Labs      02/10/18   0552  02/09/18   0319 02/08/18   0349   GLU  124*  90  130*   NA  140  140  138   K  5.5  5.9*  5.4   CL  110*  111*  109*   CO2  19*  20*  19*   BUN  34*  37*  38*   CREA  3.57*  3.58*  3.74*   CA  7.9*  7.9*  8.0*   AGAP  11  9  10   BUCR  10*  10*  10*      CBC w/Diff Recent Labs      02/10/18   0552  02/09/18   0319 02/08/18   0349   WBC  11.8  12.8  13.9*   RBC  2.64*  2.70*  2.00*   HGB  7.6*  7.9*  5.8*   HCT  23.0*  23.1*  17.1*   PLT  142  143  156   GRANS  75*  75*  70   LYMPH  13*  15*  20*   EOS  4  4  4      Cardiac Enzymes No results for input(s): CPK, CKND1, STEFAN in the last 72 hours. No lab exists for component: CKRMB, TROIP   Coagulation No results for input(s): PTP, INR, APTT in the last 72 hours. No lab exists for component: INREXT    Lipid Panel No results found for: CHOL, CHOLPOCT, CHOLX, CHLST, CHOLV, 561097, HDL, LDL, LDLC, DLDLP, 355261, VLDLC, VLDL, TGLX, TRIGL, TRIGP, TGLPOCT, CHHD, CHHDX   BNP No results for input(s): BNPP in the last 72 hours. Liver Enzymes No results for input(s): TP, ALB, TBIL, AP, SGOT, GPT in the last 72 hours.     No lab exists for component: DBIL   Thyroid Studies Lab Results   Component Value Date/Time    TSH 1.32 11/29/2017 05:39 AM        Procedures/imaging: see electronic medical records for all procedures/Xrays and details which were not copied into this note but were reviewed prior to creation of [place] MD Natan
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
## 2018-02-10 NOTE — ROUTINE PROCESS
Shift summary:     Patient up ambulating hallway. Pt. Voiding clear, yellow urine, BM last night. Pain managed. Friend visited with patient. Dressing to abd. Wound changed/intact. Patient worked with PT today. Rested in bed, watching t.v. With call light within reach.      Patient Vitals for the past 12 hrs:   Temp Pulse Resp BP SpO2   02/10/18 1421 98.1 °F (36.7 °C) 77 18 164/82 100 %   02/10/18 1151 98.1 °F (36.7 °C) 83 18 149/73 97 %   02/10/18 1010 - - - - 96 %   02/10/18 0825 98.2 °F (36.8 °C) 79 18 157/79 96 %

## 2018-02-11 VITALS
RESPIRATION RATE: 18 BRPM | BODY MASS INDEX: 22.28 KG/M2 | TEMPERATURE: 98.6 F | SYSTOLIC BLOOD PRESSURE: 157 MMHG | HEIGHT: 64 IN | DIASTOLIC BLOOD PRESSURE: 77 MMHG | WEIGHT: 130.51 LBS | HEART RATE: 76 BPM | OXYGEN SATURATION: 99 %

## 2018-02-11 LAB
ANION GAP SERPL CALC-SCNC: 9 MMOL/L (ref 3–18)
BACTERIA SPEC CULT: NORMAL
BASOPHILS # BLD: 0.1 K/UL (ref 0–0.06)
BASOPHILS NFR BLD: 1 % (ref 0–2)
BUN SERPL-MCNC: 34 MG/DL (ref 7–18)
BUN/CREAT SERPL: 10 (ref 12–20)
CALCIUM SERPL-MCNC: 7.7 MG/DL (ref 8.5–10.1)
CHLORIDE SERPL-SCNC: 113 MMOL/L (ref 100–108)
CO2 SERPL-SCNC: 18 MMOL/L (ref 21–32)
CREAT SERPL-MCNC: 3.48 MG/DL (ref 0.6–1.3)
DIFFERENTIAL METHOD BLD: ABNORMAL
EOSINOPHIL # BLD: 0.5 K/UL (ref 0–0.4)
EOSINOPHIL NFR BLD: 4 % (ref 0–5)
ERYTHROCYTE [DISTWIDTH] IN BLOOD BY AUTOMATED COUNT: 17.4 % (ref 11.6–14.5)
GLUCOSE SERPL-MCNC: 84 MG/DL (ref 74–99)
GRAM STN SPEC: NORMAL
GRAM STN SPEC: NORMAL
HCT VFR BLD AUTO: 23.3 % (ref 36–48)
HGB BLD-MCNC: 7.7 G/DL (ref 13–16)
LYMPHOCYTES # BLD: 1.7 K/UL (ref 0.9–3.6)
LYMPHOCYTES NFR BLD: 16 % (ref 21–52)
MCH RBC QN AUTO: 29.1 PG (ref 24–34)
MCHC RBC AUTO-ENTMCNC: 33 G/DL (ref 31–37)
MCV RBC AUTO: 87.9 FL (ref 74–97)
MONOCYTES # BLD: 0.9 K/UL (ref 0.05–1.2)
MONOCYTES NFR BLD: 8 % (ref 3–10)
NEUTS SEG # BLD: 7.9 K/UL (ref 1.8–8)
NEUTS SEG NFR BLD: 71 % (ref 40–73)
PLATELET # BLD AUTO: 146 K/UL (ref 135–420)
PMV BLD AUTO: 11.2 FL (ref 9.2–11.8)
POTASSIUM SERPL-SCNC: 5.7 MMOL/L (ref 3.5–5.5)
RBC # BLD AUTO: 2.65 M/UL (ref 4.7–5.5)
SERVICE CMNT-IMP: NORMAL
SODIUM SERPL-SCNC: 140 MMOL/L (ref 136–145)
WBC # BLD AUTO: 11 K/UL (ref 4.6–13.2)

## 2018-02-11 PROCEDURE — 74011250636 HC RX REV CODE- 250/636: Performed by: HOSPITALIST

## 2018-02-11 PROCEDURE — 36415 COLL VENOUS BLD VENIPUNCTURE: CPT | Performed by: INTERNAL MEDICINE

## 2018-02-11 PROCEDURE — 80048 BASIC METABOLIC PNL TOTAL CA: CPT | Performed by: INTERNAL MEDICINE

## 2018-02-11 PROCEDURE — 74011250636 HC RX REV CODE- 250/636: Performed by: INTERNAL MEDICINE

## 2018-02-11 PROCEDURE — 74011250637 HC RX REV CODE- 250/637: Performed by: PHYSICIAN ASSISTANT

## 2018-02-11 PROCEDURE — 74011250637 HC RX REV CODE- 250/637: Performed by: INTERNAL MEDICINE

## 2018-02-11 PROCEDURE — 85025 COMPLETE CBC W/AUTO DIFF WBC: CPT | Performed by: INTERNAL MEDICINE

## 2018-02-11 RX ORDER — LEVOFLOXACIN 500 MG/1
500 TABLET, FILM COATED ORAL
Qty: 5 TAB | Refills: 0 | Status: SHIPPED | OUTPATIENT
Start: 2018-02-11

## 2018-02-11 RX ADMIN — SODIUM BICARBONATE 650 MG TABLET 650 MG: at 09:10

## 2018-02-11 RX ADMIN — HYDRALAZINE HYDROCHLORIDE 25 MG: 25 TABLET, FILM COATED ORAL at 09:09

## 2018-02-11 RX ADMIN — HEPARIN SODIUM 5000 UNITS: 5000 INJECTION, SOLUTION INTRAVENOUS; SUBCUTANEOUS at 06:30

## 2018-02-11 RX ADMIN — HYDROMORPHONE HYDROCHLORIDE 1 MG: 1 INJECTION, SOLUTION INTRAMUSCULAR; INTRAVENOUS; SUBCUTANEOUS at 06:29

## 2018-02-11 RX ADMIN — AMLODIPINE BESYLATE 10 MG: 5 TABLET ORAL at 09:09

## 2018-02-11 RX ADMIN — SODIUM CHLORIDE 100 ML/HR: 900 INJECTION, SOLUTION INTRAVENOUS at 06:30

## 2018-02-11 RX ADMIN — LABETALOL HYDROCHLORIDE 200 MG: 200 TABLET, FILM COATED ORAL at 09:10

## 2018-02-11 RX ADMIN — HYDROMORPHONE HYDROCHLORIDE 1 MG: 1 INJECTION, SOLUTION INTRAMUSCULAR; INTRAVENOUS; SUBCUTANEOUS at 01:35

## 2018-02-11 RX ADMIN — HYDROMORPHONE HYDROCHLORIDE 1 MG: 1 INJECTION, SOLUTION INTRAMUSCULAR; INTRAVENOUS; SUBCUTANEOUS at 10:35

## 2018-02-11 RX ADMIN — Medication 100 MG: at 09:10

## 2018-02-11 RX ADMIN — SERTRALINE HYDROCHLORIDE 100 MG: 50 TABLET ORAL at 09:10

## 2018-02-11 RX ADMIN — FOLIC ACID 1 MG: 1 TABLET ORAL at 09:10

## 2018-02-11 RX ADMIN — VITAM B12 100 MCG: 100 TAB at 09:09

## 2018-02-11 RX ADMIN — ATORVASTATIN CALCIUM 10 MG: 10 TABLET, FILM COATED ORAL at 09:10

## 2018-02-11 NOTE — ROUTINE PROCESS
Bedside and Verbal shift change report given to Bashir Melivn RN (oncoming nurse) by Ximena Davenport RN (offgoing nurse). Report included the following information SBAR, Kardex, ED Summary, Procedure Summary, Intake/Output, MAR, Recent Results and Med Rec Status.

## 2018-02-11 NOTE — PROGRESS NOTES
Problem: Falls - Risk of  Goal: *Absence of Falls  Document Rima Fall Risk and appropriate interventions in the flowsheet.    Outcome: Resolved/Met Date Met: 02/11/18  Fall Risk Interventions:            Medication Interventions: Evaluate medications/consider consulting pharmacy         History of Falls Interventions: Evaluate medications/consider consulting pharmacy

## 2018-02-11 NOTE — DISCHARGE SUMMARY
Discharge Summary    Patient: Shady Brooks MRN: [id number]  CSN: [id number]    YOB: 1960  Age: 65 y.o. Sex: male    DOA: 2/6/2018 LOS:  LOS: 5 days   Discharge Date:      Primary Care Provider:  Lindsay Willis MD    Admission Diagnoses: Cellulitis  Abscess, abdomen ([facility]  Anemia  Leukocytosis  Cellulitis and abscess of trunk  Anemia  Acute kidney injury ([facility]  Sickle cell crisis [facility])    Discharge Diagnoses:    Hospital Problems  Date Reviewed: 2/6/2018          Codes Class Noted POA    Leukocytosis ICD-10-CM: D72.829  ICD-9-CM: 288.60  2/6/2018 Yes        * (Principal)Cellulitis and abscess of trunk ICD-10-CM: L03.319, L02.219  ICD-9-CM: 682.2  2/6/2018 Yes        Acute renal failure superimposed on chronic kidney disease ([place] [address].) ICD-10-CM: N17.9, N18.9  ICD-9-CM: 584.9, 585.9  2/6/2018 Yes        Substance induced mood disorder ([facility] ICD-10-CM: F19.94  ICD-9-CM: 292.84  11/29/2017 Yes        CKD (chronic kidney disease) stage 3, GFR 30-59 ml/min ICD-10-CM: N18.3  ICD-9-CM: 585.3  3/21/2017 Yes        Sickle cell anemia (HCC) (Chronic) ICD-10-CM: D57.1  ICD-9-CM: 282.60  11/4/2014 Yes        Chronic hepatitis C ([facility] (Chronic) ICD-10-CM: B18.2  ICD-9-CM: 070.54  11/4/2014 Yes              Discharge Condition: Stable    Discharge Medications:     Current Discharge Medication List      START taking these medications    Details   levoFLOXacin (LEVAQUIN) 500 mg tablet Take 1 Tab by mouth every fourty-eight (48) hours. Qty: 5 Tab, Refills: 0         CONTINUE these medications which have NOT CHANGED    Details   hydrALAZINE (APRESOLINE) 25 mg tablet Take 25 mg by mouth three (3) times daily. baclofen (LIORESAL) 10 mg tablet Take 10 mg by mouth three (3) times daily. atorvastatin (LIPITOR) 10 mg tablet Take 10 mg by mouth daily. labetalol (NORMODYNE) 200 mg tablet Take 200 mg by mouth two (2) times a day. sertraline (ZOLOFT) 100 mg tablet Take 100 mg by mouth daily.       pyridoxine, vitamin B6, (VITAMIN B-6) 100 mg tablet Take 1 Tab by mouth daily. Indications: DRUG-INDUCED PYRIDOXINE DEFICIENCY  Qty: 7 Tab, Refills: 0      cyanocobalamin (VITAMIN B-12) 100 mcg tablet Take 1 Tab by mouth daily. Indications: PREVENTION OF VITAMIN B12 DEFICIENCY  Qty: 7 Tab, Refills: 0      amLODIPine (NORVASC) 10 mg tablet Take 1 Tab by mouth daily. Indications: Chronic Stable Angina Pectoris  Qty: 7 Tab, Refills: 0      folic acid (FOLVITE) 1 mg tablet Take 1 Tab by mouth daily. Indications: Folate Deficiency  Qty: 7 Tab, Refills: 0         STOP taking these medications       traZODone (DESYREL) 100 mg tablet Comments:   Reason for Stopping: not taking         sodium bicarbonate 650 mg tablet Comments:   Reason for Stopping: not taking               Procedures : I & D in ed     Consults: General Surgery      PHYSICAL EXAM   Visit Vitals    /77    Pulse 76    Temp 98.6 °F (37 °C)    Resp 18    Ht 5' 4\" (1.626 m)    Wt 59.2 kg (130 lb 8.2 oz)    SpO2 99%    BMI 22.4 kg/m2     General: Awake, cooperative, no acute distress    HEENT: NC, Atraumatic. PERRLA, EOMI. Anicteric sclerae. Lungs:  CTA Bilaterally. No Wheezing/Rhonchi/Rales. Heart:  Regular  rhythm,  No murmur, No Rubs, No Gallops  Abdomen: Soft, Non distended, Non tender. +Bowel sounds, I/& site noted and no discharge. Extremities: No c/c/e  Psych:   Not anxious or agitated. Neurologic:  No acute neurological deficits. Admission HPI :    Favian Ayala is a 65 y.o.   male who has sickle cell anemia and heroin and cocaine abuse   Comes to ER with weakness, dizziness, Fevers and chills,   Had abscess in abdomen increasing in size and pain  Tried percocet no improvement  Patient normally uses a rack of heroin a day  Has been trying to cut back occasionally snorts but mostly skin pops  Hx of MRSA and multiple abscess in pass  Followed by [facility] wants to seek help for drug disorder      In ER abdominal  abscess drained and given Vancomycin      Hospital Course :   Since he was admitted, surgeon was on board for his abdominal Wall Cellulitis and abscess. I&D was done in ed and bcx/wound cx no growth. Surgeon recommended to continue local care and abx. Wound care on board. He has  Chronic Hepatitis C and need f/u with hepatology for treatment if he is the candidate. He presented acute on Chronic Kidney Disease, his cr was 3.03 on 12/30/17. He received iv hydration and his cr was stable. His cr was 3.48, needs to f/u nephrology as out-pt. He has sickle Cell Anemia and received transfusion during his stay. He is heroin and Cocaine Abuse and he will call drug rehab for admission after discharge. He also obtains medical record per himself as requested per drug rehab. Plan discussed with pt and he indicated a verbal understanding.      Activity: Activity as tolerated    Diet: Regular Diet    Follow-up: pcm . Drug rehab , nephrology , hepatology     Disposition: home     Minutes spent on discharge: 50 min       Labs: Results:       Chemistry Recent Labs      02/11/18   0232  02/10/18   0552  02/09/18   0319   GLU  84  124*  90   NA  140  140  140   K  5.7*  5.5  5.9*   CL  113*  110*  111*   CO2  18*  19*  20*   BUN  34*  34*  37*   CREA  3.48*  3.57*  3.58*   CA  7.7*  7.9*  7.9*   AGAP  9  11  9   BUCR  10*  10*  10*      CBC w/Diff Recent Labs      02/11/18   0232  02/10/18   0552  02/09/18 0319   WBC  11.0  11.8  12.8   RBC  2.65*  2.64*  2.70*   HGB  7.7*  7.6*  7.9*   HCT  23.3*  23.0*  23.1*   PLT  146  142  143   GRANS  71  75*  75*   LYMPH  16*  13*  15*   EOS  4  4  4      Cardiac Enzymes No results for input(s): CPK, CKND1, STEFAN in the last 72 hours. No lab exists for component: CKRMB, TROIP   Coagulation No results for input(s): PTP, INR, APTT in the last 72 hours.     No lab exists for component: INREXT    Lipid Panel No results found for: CHOL, CHOLPOCT, CHOLX, CHLST, CHOLV, [id number], HDL, LDL, LDLC, DLDLP, [id number], VLDLC, VLDL, TGLX, TRIGL, TRIGP, TGLPOCT, CHHD, CHHDX   BNP No results for input(s): BNPP in the last 72 hours. Liver Enzymes No results for input(s): TP, ALB, TBIL, AP, SGOT, GPT in the last 72 hours. No lab exists for component: DBIL   Thyroid Studies Lab Results   Component Value Date/Time    TSH 1.32 11/29/2017 05:39 AM            Significant Diagnostic Studies: Ct Abd Pelv W Cont    Result Date: 2/6/2018  EXAM: CT of the abdomen and pelvis INDICATION: Abdominal pain, abscess abdominal wall, history of sickle cell, COMPARISON: CT dated March 22, 2017 and CT dated July 12, 2015 TECHNIQUE: Axial CT imaging of the abdomen and pelvis was performed with intravenous contrast. Multiplanar reformats were generated. DOSE REDUCTION:  One or more dose reduction techniques were used on this CT: automated exposure control, adjustment of the mAs and/or kVp according to patient's size, and iterative reconstruction techniques. The specific techniques utilized on this CT exam have been documented in the patient's electronic medical record. _______________ FINDINGS: LOWER CHEST: Unremarkable. LIVER, BILIARY: Liver is normal. No biliary dilation. Gallbladder is unremarkable. PANCREAS: Normal. SPLEEN: Spleen is atrophic ADRENALS: Normal. KIDNEYS/URETERS: There is scarring the upper pole the left kidney as well as the midpole the left kidney. Kidneys demonstrate normal enhancement. Perinephric stranding seen around both kidneys. There is no hydronephrosis. Probable renal cysts present in the left kidney. No renal calculi identified. Ureters are difficult to visualize. VASCULATURE: Mild calcific atherosclerosis present LYMPH NODES: No enlarged lymph nodes seen GASTRO INTESTINAL TRACT: Appendix is normal. There are fluid-filled nondilated small bowel loops throughout the abdomen. This is nonspecific finding. No definite evidence of bowel obstruction. Moderate amount of stool is present in the colon. PELVIC ORGANS/BLADDER: There is significant bladder wall thickening measuring up to 8 mm. The urinary bladder appears fairly well distended. Findings raise a possibility of cystitis. Correlate with urinalysis. Evaluation the pelvis is limited by beam hardening artifacts in the right hip arthroplasty. No free fluid seen. Prostate is normal in size. BONES: Right hip arthroplasty present with productive changes about the hip joint. Sclerotic densities are seen in the left proximal femoral metaphysis suggesting bone infarction. Similar areas are seen in the left femoral neck. There are subchondral cysts in the left acetabulum. There is osteopenia. Patchy sclerotic densities are seen in the left side of the sacrum and iliac wing again suggesting bone infarction. Mild densities are seen in the vertebral bodies again corresponding to patient's history of sickle cell. There are endplate infarctions of superior and inferior endplates of L3 and L4. OTHER: There is a complex fluid collection the subcutaneous knees tissues of the left lower quadrant anterior abdominal wall fluid collection measuring 2.6 x 1.9 cm with adjacent skin thickening. This is suggestive of a subcutaneous abscess. _______________     IMPRESSION: 1. Complex fluid collection in the subcutaneous tissues of the left lower quadrant anterior abdominal wall with adjacent skin thickening suggesting an abscess. Perinephric stranding around both kidneys with evidence of scarring Urinary bladder wall thickening suggesting cystitis. Mottled bone densities suggesting bone infarctions. Fluid-filled nondilated small bowel loops. This is nonspecific finding but may be related to enteritis correlate clinically     Us Retroperitoneum Comp    Result Date: 2/8/2018  Retroperitoneal Ultrasound History: Renal failure. History of sickle cell anemia. Comparison: CT 2/6/2018 Technique: Multiple gray scale sonographic images of the kidneys and bladder were obtained.   Additional color Doppler and the Doppler waveform images were obtained . Findings: The right kidney measures 7.7 cm in length and demonstrates diffusely increased parenchymal echogenicity. No solid renal lesion. Upper pole renal cyst measures 1.1 x 1.2 x 1.7 cm. There is no evidence of hydronephrosis. The left kidney measures 9.6 cm in length and demonstrates diffusely increased parenchymal echogenicity. No solid renal lesion. Interpolar and lower pole left renal cysts are present, measuring 1.2 x 1.2 x 0.8 and 0.7 x 0.5 x 0.5 cm, respectively. There is no evidence of hydronephrosis. Somewhat thick-walled appearance to the posterior aspect of the urinary bladder noted. The estimated volume of the bladder pre-void is 230 mL. The estimated postvoid residual is 33.6 mL. Bilateral ureteral jets were visualized. IMPRESSION: 1. No evidence of hydronephrosis involving either kidney. 2. Diffusely increased renal parenchymal echogenicity, most in keeping with sequela of medical renal disease. 3. Thickening of the posterior bladder wall, nonspecific. Correlation for symptoms of cystitis/urinary tract infection may be helpful. 4. Bilateral renal cysts. Xr Chest Port    Result Date: 2/6/2018  Chest, single view Indication: Chest pain, hypertension, history of sickle cell disease Comparison: 11/26/2017 Findings:  Portable upright AP view of the chest was obtained. The cardiomediastinal silhouette is within normal limits. The pulmonary vasculature is unremarkable. Lung parenchyma is well aerated, without focal consolidation. No pleural effusion nor pneumothorax. A few subchondral cystic changes are seen in the superomedial right humeral head. No acute osseous abnormality. Impression: No radiographic evidence of an acute abnormality.             [facility]     CC: Lindsay Willis MD

## 2018-02-11 NOTE — PROGRESS NOTES
Shift summary:     Patient cleared for discharge, provided with d/c education, review of follow-up appointments and prescription. [facility] number provided. Dressing to abdomen C/D/I. Taxi called for patient, staff escorted pt. To cab in stable condition with all his personal items.      Patient Vitals for the past 12 hrs:   Temp Pulse Resp BP SpO2   02/11/18 0856 - - - - 99 %   02/11/18 0809 98.6 °F (37 °C) 76 18 157/77 99 %   02/11/18 0336 98.7 °F (37.1 °C) 77 18 142/73 95 %

## 2018-02-11 NOTE — ROUTINE PROCESS
Bedside and Verbal shift change report given to BENITO Nieves (oncoming nurse) by Addison Manzo   (offgoing nurse). Report included the following information SBAR, Kardex and MAR.

## 2018-02-12 LAB
BACTERIA SPEC CULT: NORMAL
SERVICE CMNT-IMP: NORMAL

## 2018-02-22 ENCOUNTER — APPOINTMENT (OUTPATIENT)
Dept: GENERAL RADIOLOGY | Age: 58
End: 2018-02-22
Attending: EMERGENCY MEDICINE
Payer: MEDICAID

## 2018-02-22 ENCOUNTER — HOSPITAL ENCOUNTER (EMERGENCY)
Age: 58
Discharge: OTHER INSTITUTION | End: 2018-02-22
Attending: EMERGENCY MEDICINE
Payer: MEDICAID

## 2018-02-22 VITALS
HEIGHT: 64 IN | HEART RATE: 90 BPM | OXYGEN SATURATION: 100 % | SYSTOLIC BLOOD PRESSURE: 142 MMHG | WEIGHT: 130 LBS | BODY MASS INDEX: 22.2 KG/M2 | DIASTOLIC BLOOD PRESSURE: 78 MMHG | RESPIRATION RATE: 16 BRPM | TEMPERATURE: 97.9 F

## 2018-02-22 DIAGNOSIS — G89.4 CHRONIC PAIN SYNDROME: Primary | ICD-10-CM

## 2018-02-22 LAB
ALBUMIN SERPL-MCNC: 2.5 G/DL (ref 3.4–5)
ALBUMIN/GLOB SERPL: 0.6 {RATIO} (ref 0.8–1.7)
ALP SERPL-CCNC: 61 U/L (ref 45–117)
ALT SERPL-CCNC: 26 U/L (ref 16–61)
AMPHET UR QL SCN: NEGATIVE
ANION GAP SERPL CALC-SCNC: 9 MMOL/L (ref 3–18)
APPEARANCE UR: CLEAR
AST SERPL-CCNC: 34 U/L (ref 15–37)
BARBITURATES UR QL SCN: NEGATIVE
BASOPHILS # BLD: 0 K/UL (ref 0–0.06)
BASOPHILS NFR BLD: 0 % (ref 0–2)
BENZODIAZ UR QL: NEGATIVE
BILIRUB SERPL-MCNC: 0.5 MG/DL (ref 0.2–1)
BILIRUB UR QL: NEGATIVE
BUN SERPL-MCNC: 61 MG/DL (ref 7–18)
BUN/CREAT SERPL: 15 (ref 12–20)
CALCIUM SERPL-MCNC: 7.9 MG/DL (ref 8.5–10.1)
CANNABINOIDS UR QL SCN: NEGATIVE
CHLORIDE SERPL-SCNC: 110 MMOL/L (ref 100–108)
CO2 SERPL-SCNC: 19 MMOL/L (ref 21–32)
COCAINE UR QL SCN: POSITIVE
COLOR UR: YELLOW
CREAT SERPL-MCNC: 4.07 MG/DL (ref 0.6–1.3)
DIFFERENTIAL METHOD BLD: ABNORMAL
EOSINOPHIL # BLD: 0.3 K/UL (ref 0–0.4)
EOSINOPHIL NFR BLD: 2 % (ref 0–5)
ERYTHROCYTE [DISTWIDTH] IN BLOOD BY AUTOMATED COUNT: 16.4 % (ref 11.6–14.5)
ETHANOL SERPL-MCNC: <3 MG/DL (ref 0–3)
GLOBULIN SER CALC-MCNC: 3.9 G/DL (ref 2–4)
GLUCOSE SERPL-MCNC: 101 MG/DL (ref 74–99)
GLUCOSE UR STRIP.AUTO-MCNC: NEGATIVE MG/DL
HCT VFR BLD AUTO: 21.5 % (ref 36–48)
HDSCOM,HDSCOM: ABNORMAL
HGB BLD-MCNC: 7.4 G/DL (ref 13–16)
HGB UR QL STRIP: ABNORMAL
KETONES UR QL STRIP.AUTO: NEGATIVE MG/DL
LEUKOCYTE ESTERASE UR QL STRIP.AUTO: NEGATIVE
LYMPHOCYTES # BLD: 1.7 K/UL (ref 0.9–3.6)
LYMPHOCYTES NFR BLD: 11 % (ref 21–52)
MCH RBC QN AUTO: 29.2 PG (ref 24–34)
MCHC RBC AUTO-ENTMCNC: 34.4 G/DL (ref 31–37)
MCV RBC AUTO: 85 FL (ref 74–97)
METHADONE UR QL: NEGATIVE
MONOCYTES # BLD: 1.1 K/UL (ref 0.05–1.2)
MONOCYTES NFR BLD: 7 % (ref 3–10)
NEUTS SEG # BLD: 12.7 K/UL (ref 1.8–8)
NEUTS SEG NFR BLD: 80 % (ref 40–73)
NITRITE UR QL STRIP.AUTO: NEGATIVE
OPIATES UR QL: POSITIVE
PCP UR QL: NEGATIVE
PH UR STRIP: 6.5 [PH] (ref 5–8)
PLATELET # BLD AUTO: 189 K/UL (ref 135–420)
PMV BLD AUTO: 10.5 FL (ref 9.2–11.8)
POTASSIUM SERPL-SCNC: 5.3 MMOL/L (ref 3.5–5.5)
PROT SERPL-MCNC: 6.4 G/DL (ref 6.4–8.2)
PROT UR STRIP-MCNC: 300 MG/DL
RBC # BLD AUTO: 2.53 M/UL (ref 4.7–5.5)
RETICS/RBC NFR AUTO: 2.5 % (ref 0.5–2.3)
SODIUM SERPL-SCNC: 138 MMOL/L (ref 136–145)
SP GR UR REFRACTOMETRY: 1.01 (ref 1–1.03)
UROBILINOGEN UR QL STRIP.AUTO: 0.2 EU/DL (ref 0.2–1)
WBC # BLD AUTO: 15.8 K/UL (ref 4.6–13.2)
WBC URNS QL MICRO: NORMAL /HPF (ref 0–5)

## 2018-02-22 PROCEDURE — 96374 THER/PROPH/DIAG INJ IV PUSH: CPT

## 2018-02-22 PROCEDURE — 74011250636 HC RX REV CODE- 250/636: Performed by: EMERGENCY MEDICINE

## 2018-02-22 PROCEDURE — 80307 DRUG TEST PRSMV CHEM ANLYZR: CPT | Performed by: EMERGENCY MEDICINE

## 2018-02-22 PROCEDURE — 96372 THER/PROPH/DIAG INJ SC/IM: CPT

## 2018-02-22 PROCEDURE — 99285 EMERGENCY DEPT VISIT HI MDM: CPT

## 2018-02-22 PROCEDURE — 85045 AUTOMATED RETICULOCYTE COUNT: CPT | Performed by: EMERGENCY MEDICINE

## 2018-02-22 PROCEDURE — 80053 COMPREHEN METABOLIC PANEL: CPT | Performed by: EMERGENCY MEDICINE

## 2018-02-22 PROCEDURE — 96375 TX/PRO/DX INJ NEW DRUG ADDON: CPT

## 2018-02-22 PROCEDURE — 74011250637 HC RX REV CODE- 250/637: Performed by: EMERGENCY MEDICINE

## 2018-02-22 PROCEDURE — 85025 COMPLETE CBC W/AUTO DIFF WBC: CPT | Performed by: EMERGENCY MEDICINE

## 2018-02-22 PROCEDURE — 81001 URINALYSIS AUTO W/SCOPE: CPT | Performed by: EMERGENCY MEDICINE

## 2018-02-22 PROCEDURE — 71046 X-RAY EXAM CHEST 2 VIEWS: CPT

## 2018-02-22 RX ORDER — ONDANSETRON 2 MG/ML
4 INJECTION INTRAMUSCULAR; INTRAVENOUS
Status: COMPLETED | OUTPATIENT
Start: 2018-02-22 | End: 2018-02-22

## 2018-02-22 RX ORDER — CLONIDINE HYDROCHLORIDE 0.1 MG/1
0.2 TABLET ORAL
Status: COMPLETED | OUTPATIENT
Start: 2018-02-22 | End: 2018-02-22

## 2018-02-22 RX ORDER — KETOROLAC TROMETHAMINE 30 MG/ML
30 INJECTION, SOLUTION INTRAMUSCULAR; INTRAVENOUS ONCE
Status: COMPLETED | OUTPATIENT
Start: 2018-02-22 | End: 2018-02-22

## 2018-02-22 RX ORDER — DICYCLOMINE HYDROCHLORIDE 10 MG/ML
20 INJECTION INTRAMUSCULAR ONCE
Status: COMPLETED | OUTPATIENT
Start: 2018-02-22 | End: 2018-02-22

## 2018-02-22 RX ADMIN — KETOROLAC TROMETHAMINE 30 MG: 30 INJECTION, SOLUTION INTRAMUSCULAR at 05:25

## 2018-02-22 RX ADMIN — DICYCLOMINE HYDROCHLORIDE 20 MG: 20 INJECTION, SOLUTION INTRAMUSCULAR at 05:25

## 2018-02-22 RX ADMIN — CLONIDINE HYDROCHLORIDE 0.2 MG: 0.1 TABLET ORAL at 05:25

## 2018-02-22 RX ADMIN — ONDANSETRON 4 MG: 2 INJECTION INTRAMUSCULAR; INTRAVENOUS at 05:25

## 2018-02-22 NOTE — ED NOTES
Verbal report given to Gilmer Arzate, REBECCA at [facility] for pt transfer. SBAR, MAR, recent results, plan of care, and vital signs reviewed. Opportunity for questions provided.
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
## 2018-02-22 NOTE — ED NOTES
Pt accepted at [facility] with Anne-Marie Baldwin MD. Number to call report [phone]. Pt updated on plan of care. Breakfast at bedside.

## 2018-02-22 NOTE — ED TRIAGE NOTES
Pt brought to ED by EMS from [facility]. Pt reports placed in home 1 year ago, reports to ED c/c suicidal ideations and \"sickle cell pain. \" Pt states, \"I want to cut my wrist with a razor. \" Pt reports heroin addiction, last time using was 2 days ago. Pt becomes loud and uncooperative as questions asked during triage process.

## 2018-02-22 NOTE — ED NOTES
Pt hourly rounding competed. Safety   Pt (x) resting on stretcher with side rails up and call bell in reach. () in chair    () in parents arms. Toileting   Pt offered ()Bedpan     ()Assistance to Restroom     ()Urinal  Ongoing Updates  Updated on plan of care and status of test results.   Pain Management  Inquired as to comfort and offered comfort measures:    (x) warm blankets   () dimmed lights

## 2018-02-22 NOTE — ED NOTES
Pt report received from Jim Brownlee RN. SBAR, ED summary, MAR and recent results reviewed. 1:1 sitter at bedside. Pt to be voluntary admit. [facility] currently looking for accepting facility. Pt resting in stretcher with eyes closed, appears to be asleep, with even, unlabored respirations.

## 2019-10-31 NOTE — ED NOTES
When in to speak with pt, refuses to answer questions about suicidal thoughts. States \"I don't feel like talking about that. \" Pt awakened from sleep to initiate IV and assess. 3

## 2024-01-01 NOTE — ROUTINE PROCESS
04:45: Pt received as a transfer from ED at this time. Pt brought to unit via stretcher/bed with transportation staff in attendance. Pt is ambulatory and was able to transfer from stretcher to bed without difficulty. Immediately upon transfer pt verbalized his need for additional analgesia; stating \"What they gave me ain't enough, man. I need something more. \" Pt informed that per current orders no additional analgesics could be given at this time. Pt appeared frustrated by this answer and would not provide additional data for admission summary. NS infusion started at 150 cc/hr to 20 gauge IV located on left hand. Pt is on continuous cardiac monitoring, telemetry box # 38; NSREmelia Summers 06:05 Pt declined v/s assessment from this writer, continues to focus on obtaining MS04 for pain. It should be noted that pt was sleeping soundly prior to this writer entering room.
19:40: Received verbal/bedside change of shift report from ENID López RN. Report included SBAR, KARDEX, MAR and recent results. 20:15: Pt received resting quietly in bed with first unit of two ordered units PRBC's infusing at 150 cc/hr. Pt inquired as to when he could next receive his PRN MS04. Last dosing was given at 17:53. Pt informed that it would be most beneficial for him if both units of PRBC's could be infused first without interruption. Pt grudgingly accepted. Monitoring ongoing. 20:35: Second unit PRBC's started at this time. V/S nominal for this individual. No transfusion reaction evident. 22:15: Second unit PRBC's completed. After IV line was flushed and NS infusion restarted pt was given his PRN MS04 as requested at 22:23.         06:25: Pt slept soundly remainder of shift after above appended note was written. Pt requested and received PRN MS04 2 MG IVP at this time for pain he rated as being 9/10 on numeric pain scale.
Bedside and Verbal shift change report given to DAVID Farias (oncoming nurse) by ENID Regalado RN (offgoing nurse). Report included the following information SBAR, Kardex, MAR and Recent Results.
Bedside and Verbal shift change report given to JOAQUIN Daniels RN  (oncoming nurse) by  EVERETTE Gutierrez RN  (offgoing nurse). Report included the following information SBAR, Kardex, Recent Results and Med Rec Status.
Bedside, Verbal and Written shift change report given to ENID Ramachandran (oncoming nurse) by Tri Galindo. Jacque Shabazz (offgoing nurse). Report included the following information SBAR, Kardex, MAR and Recent Results.
Echo complete. Report to follow.
Pt being transferred to [facility]. Transport set up for 1400. Telephone, verbal report given to Corey Darby RN at this time.
Statement Selected

## 2024-04-17 NOTE — IP AVS SNAPSHOT
[address] [facility] [address] [place] [address] 
[phone] Patient: Vandana Arellano MRN: [id number] IKW:[id number] About your hospitalization You were admitted on:  November 28, 2017 You last received care in the:  [facility] You were discharged on:  December 1, 2017 Why you were hospitalized Your primary diagnosis was:  Not on File Your diagnoses also included:  Substance Induced Mood Disorder (Hcc), Depression Things You Need To Do (next 8 weeks) Follow up with Lindsay Willis MD  
  
Where:  Patient can only remember the practice name and not the physician Wednesday Dec 06, 2017 Follow up with [facility]  
you he a 1:30 appointment with Dr. Michelle Began Where:  Address: [address], [place], [address] [place] [address] Hours: Open today · 8:30AM5PM 
Phone: [phone] Discharge Orders None A check naty indicates which time of day the medication should be taken. My Medications STOP taking these medications   
 doxepin 50 mg capsule Commonly known as:  SINEquan  
   
  
 oxyCODONE-acetaminophen 7.5-325 mg per tablet Commonly known as:  PERCOCET 7.5  
   
  
 sertraline 100 mg tablet Commonly known as:  ZOLOFT  
   
  
  
TAKE these medications as instructed Instructions Each Dose to Equal  
 Morning Noon Evening Bedtime  
 amLODIPine 10 mg tablet Commonly known as:  [place] Take 1 Tab by mouth daily. Indications: Chronic Stable Angina Pectoris 10 mg Tomorrow  12/2/2017  
   
   
   
  
 carvedilol 6.25 mg tablet Commonly known as:  [place] Take 1 Tab by mouth two (2) times daily (with meals). Indications: hypertension 6.25 mg Today  12/1/2017  
   
  
 cyanocobalamin 100 mcg tablet Commonly known as:  VITAMIN B-12 Take 1 Tab by mouth daily. Indications: PREVENTION OF VITAMIN B12 DEFICIENCY  
 100 mcg Tomorrow  12/2/2017  
   
   
   
  
 folic acid 1 mg tablet Commonly known as:  [place] Take 1 Tab by mouth daily. Indications: Folate Deficiency 1 mg Tomorrow 12/2/2017  
   
   
   
  
 pyridoxine (vitamin B6) 100 mg tablet Commonly known as:  VITAMIN B-6 Take 1 Tab by mouth daily. Indications: DRUG-INDUCED PYRIDOXINE DEFICIENCY  
 100 mg Tomorrow 12/2/2017  
   
   
   
  
 sodium bicarbonate 650 mg tablet Take 1 Tab by mouth two (2) times a day. Indications: Dyspepsia  
 650 mg Tomorrow 12/2/2017 * traZODone 50 mg tablet Commonly known as:  [place] Take 1 Tab by mouth three (3) times daily as needed (anxiety). Indications: insomnia associated with depression 50 mg Bedtime today 12/1/2017 * traZODone 100 mg tablet Commonly known as:  [place] Take 1 Tab by mouth nightly. Indications: insomnia associated with depression 100 mg Today at bedtime 12/1/2017 * Notice: This list has 2 medication(s) that are the same as other medications prescribed for you. Read the directions carefully, and ask your doctor or other care provider to review them with you. Where to Get Your Medications Information on where to get these meds will be given to you by the nurse or doctor. ! Ask your nurse or doctor about these medications  
  amLODIPine 10 mg tablet  
 carvedilol 6.25 mg tablet  
 cyanocobalamin 100 mcg tablet  
 folic acid 1 mg tablet  
 pyridoxine (vitamin B6) 100 mg tablet  
 sodium bicarbonate 650 mg tablet  
 traZODone 100 mg tablet  
 traZODone 50 mg tablet Discharge Instructions DISCHARGE SUMMARY from Nurse Pt. Discharged   With prescriptions What to do at Home: 
Recommended activity: Activity as tolerated, *  Please give a list of your current medications to your Primary Care Provider. *  Please update this list whenever your medications are discontinued, doses are 
    changed, or new medications (including over-the-counter products) are added. *  Please carry medication information at all times in case of emergency situations. These are general instructions for a healthy lifestyle: No smoking/ No tobacco products/ Avoid exposure to second hand smoke Surgeon General's Warning:  Quitting smoking now greatly reduces serious risk to your health. Obesity, smoking, and sedentary lifestyle greatly increases your risk for illness A healthy diet, regular physical exercise & weight monitoring are important for maintaining a healthy lifestyle You may be retaining fluid if you have a history of heart failure or if you experience any of the following symptoms:  Weight gain of 3 pounds or more overnight or 5 pounds in a week, increased swelling in our hands or feet or shortness of breath while lying flat in bed. Please call your doctor as soon as you notice any of these symptoms; do not wait until your next office visit. Recognize signs and symptoms of STROKE: 
 
F-face looks uneven A-arms unable to move or move unevenly S-speech slurred or non-existent T-time-call 911 as soon as signs and symptoms begin-DO NOT go Back to bed or wait to see if you get better-TIME IS BRAIN. Warning Signs of HEART ATTACK Call 911 if you have these symptoms: 
? Chest discomfort. Most heart attacks involve discomfort in the center of the chest that lasts more than a few minutes, or that goes away and comes back. It can feel like uncomfortable pressure, squeezing, fullness, or pain. ? Discomfort in other areas of the upper body. Symptoms can include pain or discomfort in one or both arms, the back, neck, jaw, or stomach. ? Shortness of breath with or without chest discomfort. ? Other signs may include breaking out in a cold sweat, nausea, or lightheadedness. Don't wait more than five minutes to call [address]! Fast action can save your life. Calling 911 is almost always the fastest way to get lifesaving treatment. Emergency Medical Services staff can begin treatment when they arrive  up to an hour sooner than if someone gets to the hospital by car. The discharge information has been reviewed with the patient. The patient verbalized understanding. Discharge medications reviewed with the patient and appropriate educational materials and side effects teaching were provided. [employer] Thank you for requesting access to [employer]. Please follow the instructions below to securely access and download your online medical record. [employer] allows you to send messages to your doctor, view your test results, renew your prescriptions, schedule appointments, and more. How Do I Sign Up? 1. In your internet browser, go to [email or web address] 
2. Click on the First Time User? Click Here link in the Sign In box. You will be redirect to the New Member Sign Up page. 3. Enter your [employer] Access Code exactly as it appears below. You will not need to use this code after youve completed the sign-up process. If you do not sign up before the expiration date, you must request a new code. [employer] Access Code: [email or web address] Expires: 3/1/2018 12:49 PM (This is the date your [employer] access code will ) 4. Enter the last four digits of your Social Security Number (xxxx) and Date of Birth (mm/dd/yyyy) as indicated and click Submit. You will be taken to the next sign-up page. 5. Create a [employer] ID. This will be your [employer] login ID and cannot be changed, so think of one that is secure and easy to remember. 6. Create a [employer] password. You can change your password at any time. 7. Enter your Password Reset Question and Answer. This can be used at a later time if you forget your password. 8. Enter your e-mail address. You will receive e-mail notification when new information is available in [address] 9. Click Sign Up. You can now view and download portions of your medical record. 10. Click the Download Summary menu link to download a portable copy of your medical information. Additional Information If you have questions, please visit the Frequently Asked Questions section of the [email or web address] website at [email or web address] ([email or web address])t/. Remember, [employer] is NOT to be used for urgent needs. For medical emergencies, dial 911. 
 
 
 
___________________________________________________________________________________________________________________________________DISCHARGE SUMMARY 
 
NAME:Jesús Mcnamara : 1960 MRN: [id number] The patient Patel Boles exhibits the ability to control behavior in a less restrictive environment. Patient's level of functioning is improving. No assaultive/destructive behavior has been observed for the past 24 hours. No suicidal/homicidal threat or behavior has been observed for the past 24 hours. There is no evidence of serious medication side effects. Patient has not been in physical or protective restraints for at least the past 24 hours. If weapons involved, how are they secured? No weapons involved Is patient aware of and in agreement with discharge plan? She is aware of discharge and is in agreement Arrangements for medication:  Prescriptions given to patient. ( one week scripts given ) Referral for substance abuse treatment ? Yes, referred to the [facility] - 2017 at 1:30 with Dr. Stacey Knight Referral for smoking cessation needed ? Yes, refused Copy of discharge instructions to  provider?:  Yes, faxed to [phone] Arrangements for transportation home:  Medicaid taxi Keep all follow up appointments as scheduled, continue to take prescribed medications per physician instructions. Mental health crisis number:  620 or your local mental health crisis line number at [phone] [facility] Announcement We are excited to announce that we are making your provider's discharge notes available to you in [facility]. You will see these notes when they are completed and signed by the physician that discharged you from your recent hospital stay. If you have any questions or concerns about any information you see in [facility], please call the Health Information Department where you were seen or reach out to your Primary Care Provider for more information about your plan of care. Introducing [facility]! [facility] introduces [facility] patient portal. Now you can access parts of your medical record, email your doctor's office, and request medication refills online. 1. In your internet browser, go to [email or web address] 2. Click on the First Time User? Click Here link in the Sign In box. You will see the New Member Sign Up page. 3. Enter your [facility] Access Code exactly as it appears below. You will not need to use this code after youve completed the sign-up process. If you do not sign up before the expiration date, you must request a new code. · [facility] Access Code: [email or web address] Expires: 3/1/2018 12:49 PM 
 
4. Enter the last four digits of your Social Security Number (xxxx) and Date of Birth (mm/dd/yyyy) as indicated and click Submit. You will be taken to the next sign-up page. 5. Create a [facility] ID. This will be your [facility] login ID and cannot be changed, so think of one that is secure and easy to remember. 6. Create a [facility] password. You can change your password at any time. 7. Enter your Password Reset Question and Answer. This can be used at a later time if you forget your password. 8. Enter your e-mail address. You will receive e-mail notification when new information is available in [address] 9. Click Sign Up. You can now view and download portions of your medical record. 10. Click the Download Summary menu link to download a portable copy of your medical information. If you have questions, please visit the Frequently Asked Questions section of the [employer] website. Remember, [employer] is NOT to be used for urgent needs. For medical emergencies, dial 911. Now available from your iPhone and Android! Providers Seen During Your Hospitalization Provider Specialty Primary office phone Se Ramachandran MD Psychiatry [phone] Your Primary Care Physician (PCP) Primary Care Physician Office Phone Office Fax OTHER, PHYS ** None ** ** None ** You are allergic to the following No active allergies Recent Documentation Height Weight BMI Smoking Status 1.626 m 56.7 kg 21.46 kg/m2 Current Every Day Smoker Emergency Contacts Name Discharge Info Relation Home Work Mobile Denis Enrique CAREGIVER [3] Child [2] [phone] Patient Belongings The following personal items are in your possession at time of discharge: 
  Dental Appliances: None  Visual Aid: None      Home Medications: Locked   Jewelry: None  Clothing: Belt, Footwear, Jacket/Coat, Pants, Shirt, Shorts, Socks, Sweater, Undergarments    Other Valuables: Keys, Personal toiletries (razor; soap ) Please provide this summary of care documentation to your next provider. Signatures-by signing, you are acknowledging that this After Visit Summary has been reviewed with you and you have received a copy. Patient Signature:  ____________________________________________________________ Date:  ____________________________________________________________  
  
Marcia Babb Provider Signature:  ____________________________________________________________ Date:  ____________________________________________________________ fair

## 2024-07-25 NOTE — ROUTINE PROCESS
Bedside shift change report given to Dora Snyder RN (oncoming nurse) by Lisbeth Chery RN (offgoing nurse). Report included the following information SBAR, Kardex, MAR and Recent Results. 98

## (undated) DEVICE — SKIN MARKER,REGULAR TIP WITH RULER AND LABELS: Brand: DEVON

## (undated) DEVICE — SWAB CULT SGL AMIES W/O CHAR FOR THRT VAG SKIN HRT CULTSWAB

## (undated) DEVICE — DEVON™ KNEE AND BODY STRAP 60" X 3" (1.5 M X 7.6 CM): Brand: DEVON

## (undated) DEVICE — REM POLYHESIVE ADULT PATIENT RETURN ELECTRODE: Brand: VALLEYLAB

## (undated) DEVICE — DBD-PACK,LAPAROTOMY,2 REINFORCED GOWNS: Brand: MEDLINE

## (undated) DEVICE — GAUZE SPONGES,12 PLY: Brand: CURITY

## (undated) DEVICE — BSHR MAJOR BASIN PACK-LF: Brand: MEDLINE INDUSTRIES, INC.

## (undated) DEVICE — PLUS HANDPIECE WITH SPRAY TIP: Brand: SURGILAV

## (undated) DEVICE — AMD ANTIMICROBIAL SUPER SPONGES,MEDIUM: Brand: KERLIX

## (undated) DEVICE — STERILE POLYISOPRENE POWDER-FREE SURGICAL GLOVES WITH EMOLLIENT COATING: Brand: PROTEXIS

## (undated) DEVICE — PENROSE TUBING RADIOPAQUE: Brand: ARGYLE

## (undated) DEVICE — INTENDED FOR TISSUE SEPARATION, AND OTHER PROCEDURES THAT REQUIRE A SHARP SURGICAL BLADE TO PUNCTURE OR CUT.: Brand: BARD-PARKER ® CARBON RIB-BACK BLADES

## (undated) DEVICE — INTENDED FOR TISSUE SEPARATION, AND OTHER PROCEDURES THAT REQUIRE A SHARP SURGICAL BLADE TO PUNCTURE OR CUT.: Brand: BARD-PARKER SAFETY BLADES SIZE 11, STERILE

## (undated) DEVICE — STERILE POLYISOPRENE POWDER-FREE SURGICAL GLOVES: Brand: PROTEXIS

## (undated) DEVICE — TRAY PREP DRY W/ PREM GLV 2 APPL 6 SPNG 2 UNDPD 1 OVERWRAP

## (undated) DEVICE — SPONGE GZ W4XL4IN COT 12 PLY TYP VII WVN C FLD DSGN

## (undated) DEVICE — SWAB CULT LIQ STUART AGR AERB MOD IN BRK SGL RAYON TIP PLAS 220099] BECTON DICKINSON MICRO]

## (undated) DEVICE — KENDALL SCD EXPRESS SLEEVES, KNEE LENGTH, MEDIUM: Brand: KENDALL SCD

## (undated) DEVICE — SUT ETHLN 3-0 18IN PS2 BLK --

## (undated) DEVICE — 1.5L THIN WALL CAN: Brand: CRD

## (undated) DEVICE — SOL IRRIGATION INJ NACL 0.9% 500ML BTL

## (undated) DEVICE — PAD,ABDOMINAL,5"X9",STERILE,LF,1/PK: Brand: MEDLINE INDUSTRIES, INC.